# Patient Record
Sex: FEMALE | Race: WHITE | NOT HISPANIC OR LATINO | Employment: OTHER | URBAN - METROPOLITAN AREA
[De-identification: names, ages, dates, MRNs, and addresses within clinical notes are randomized per-mention and may not be internally consistent; named-entity substitution may affect disease eponyms.]

---

## 2019-05-23 DIAGNOSIS — Z01.818 PREOP TESTING: Primary | ICD-10-CM

## 2019-05-23 DIAGNOSIS — R93.89 THICKENED ENDOMETRIUM: ICD-10-CM

## 2019-05-23 RX ORDER — SODIUM CHLORIDE, SODIUM LACTATE, POTASSIUM CHLORIDE, CALCIUM CHLORIDE 600; 310; 30; 20 MG/100ML; MG/100ML; MG/100ML; MG/100ML
INJECTION, SOLUTION INTRAVENOUS CONTINUOUS
Status: CANCELLED | OUTPATIENT
Start: 2019-05-23

## 2019-05-23 RX ORDER — DOXYCYCLINE HYCLATE 100 MG
100 TABLET ORAL
Status: CANCELLED | OUTPATIENT
Start: 2019-05-23

## 2019-05-24 ENCOUNTER — HOSPITAL ENCOUNTER (OUTPATIENT)
Dept: CARDIOLOGY | Facility: HOSPITAL | Age: 84
Discharge: HOME | End: 2019-05-24
Attending: OBSTETRICS & GYNECOLOGY
Payer: MEDICARE

## 2019-05-24 DIAGNOSIS — Z01.818 PREOP TESTING: ICD-10-CM

## 2019-05-24 DIAGNOSIS — R93.89 THICKENED ENDOMETRIUM: ICD-10-CM

## 2019-05-24 PROCEDURE — 93010 ELECTROCARDIOGRAM REPORT: CPT | Mod: ,,, | Performed by: INTERNAL MEDICINE

## 2019-05-24 PROCEDURE — 93010 EKG 12-LEAD: ICD-10-PCS | Mod: ,,, | Performed by: INTERNAL MEDICINE

## 2019-05-24 PROCEDURE — 93005 ELECTROCARDIOGRAM TRACING: CPT

## 2019-05-27 ENCOUNTER — HOSPITAL ENCOUNTER (OUTPATIENT)
Facility: HOSPITAL | Age: 84
Discharge: HOME | End: 2019-05-27
Attending: OBSTETRICS & GYNECOLOGY | Admitting: OBSTETRICS & GYNECOLOGY
Payer: MEDICARE

## 2019-05-27 ENCOUNTER — ANESTHESIA EVENT (OUTPATIENT)
Dept: SURGERY | Facility: HOSPITAL | Age: 84
End: 2019-05-27
Payer: MEDICARE

## 2019-05-27 ENCOUNTER — ANESTHESIA (OUTPATIENT)
Dept: SURGERY | Facility: HOSPITAL | Age: 84
End: 2019-05-27
Payer: MEDICARE

## 2019-05-27 VITALS
HEIGHT: 63 IN | HEART RATE: 73 BPM | SYSTOLIC BLOOD PRESSURE: 141 MMHG | RESPIRATION RATE: 20 BRPM | BODY MASS INDEX: 33.49 KG/M2 | TEMPERATURE: 97 F | OXYGEN SATURATION: 100 % | DIASTOLIC BLOOD PRESSURE: 70 MMHG | WEIGHT: 189 LBS

## 2019-05-27 DIAGNOSIS — R93.89 THICKENED ENDOMETRIUM: ICD-10-CM

## 2019-05-27 DIAGNOSIS — N94.89 ENDOMETRIAL MASS: Primary | ICD-10-CM

## 2019-05-27 PROCEDURE — 36000706: Performed by: OBSTETRICS & GYNECOLOGY

## 2019-05-27 PROCEDURE — C1782 MORCELLATOR: HCPCS | Performed by: OBSTETRICS & GYNECOLOGY

## 2019-05-27 PROCEDURE — 88305 TISSUE EXAM BY PATHOLOGIST: CPT | Mod: 26,,, | Performed by: PATHOLOGY

## 2019-05-27 PROCEDURE — D9220A PRA ANESTHESIA: ICD-10-PCS | Mod: ANES,,, | Performed by: ANESTHESIOLOGY

## 2019-05-27 PROCEDURE — 36000707: Performed by: OBSTETRICS & GYNECOLOGY

## 2019-05-27 PROCEDURE — S0020 INJECTION, BUPIVICAINE HYDRO: HCPCS | Performed by: OBSTETRICS & GYNECOLOGY

## 2019-05-27 PROCEDURE — 37000008 HC ANESTHESIA 1ST 15 MINUTES: Performed by: OBSTETRICS & GYNECOLOGY

## 2019-05-27 PROCEDURE — D9220A PRA ANESTHESIA: ICD-10-PCS | Mod: CRNA,,, | Performed by: NURSE ANESTHETIST, CERTIFIED REGISTERED

## 2019-05-27 PROCEDURE — 63600175 PHARM REV CODE 636 W HCPCS: Performed by: NURSE ANESTHETIST, CERTIFIED REGISTERED

## 2019-05-27 PROCEDURE — 88305 TISSUE SPECIMEN TO PATHOLOGY - SURGERY: ICD-10-PCS | Mod: 26,,, | Performed by: PATHOLOGY

## 2019-05-27 PROCEDURE — D9220A PRA ANESTHESIA: Mod: ANES,,, | Performed by: ANESTHESIOLOGY

## 2019-05-27 PROCEDURE — 88305 TISSUE EXAM BY PATHOLOGIST: CPT | Performed by: PATHOLOGY

## 2019-05-27 PROCEDURE — 37000009 HC ANESTHESIA EA ADD 15 MINS: Performed by: OBSTETRICS & GYNECOLOGY

## 2019-05-27 PROCEDURE — 27201423 OPTIME MED/SURG SUP & DEVICES STERILE SUPPLY: Performed by: OBSTETRICS & GYNECOLOGY

## 2019-05-27 PROCEDURE — D9220A PRA ANESTHESIA: Mod: CRNA,,, | Performed by: NURSE ANESTHETIST, CERTIFIED REGISTERED

## 2019-05-27 PROCEDURE — 71000015 HC POSTOP RECOV 1ST HR: Performed by: OBSTETRICS & GYNECOLOGY

## 2019-05-27 PROCEDURE — 25000003 PHARM REV CODE 250: Performed by: OBSTETRICS & GYNECOLOGY

## 2019-05-27 PROCEDURE — 71000033 HC RECOVERY, INTIAL HOUR: Performed by: OBSTETRICS & GYNECOLOGY

## 2019-05-27 RX ORDER — DIPHENHYDRAMINE HYDROCHLORIDE 50 MG/ML
25 INJECTION INTRAMUSCULAR; INTRAVENOUS EVERY 4 HOURS PRN
Status: CANCELLED | OUTPATIENT
Start: 2019-05-27

## 2019-05-27 RX ORDER — SODIUM CHLORIDE, SODIUM LACTATE, POTASSIUM CHLORIDE, CALCIUM CHLORIDE 600; 310; 30; 20 MG/100ML; MG/100ML; MG/100ML; MG/100ML
125 INJECTION, SOLUTION INTRAVENOUS CONTINUOUS
Status: CANCELLED | OUTPATIENT
Start: 2019-05-27

## 2019-05-27 RX ORDER — DOXYCYCLINE HYCLATE 100 MG
200 TABLET ORAL ONCE
Status: CANCELLED | OUTPATIENT
Start: 2019-05-27 | End: 2019-05-27

## 2019-05-27 RX ORDER — SODIUM CHLORIDE, SODIUM LACTATE, POTASSIUM CHLORIDE, CALCIUM CHLORIDE 600; 310; 30; 20 MG/100ML; MG/100ML; MG/100ML; MG/100ML
INJECTION, SOLUTION INTRAVENOUS CONTINUOUS
Status: CANCELLED | OUTPATIENT
Start: 2019-05-27

## 2019-05-27 RX ORDER — SODIUM CHLORIDE, SODIUM LACTATE, POTASSIUM CHLORIDE, CALCIUM CHLORIDE 600; 310; 30; 20 MG/100ML; MG/100ML; MG/100ML; MG/100ML
INJECTION, SOLUTION INTRAVENOUS CONTINUOUS
Status: DISCONTINUED | OUTPATIENT
Start: 2019-05-27 | End: 2019-05-27 | Stop reason: HOSPADM

## 2019-05-27 RX ORDER — IBUPROFEN 800 MG/1
800 TABLET ORAL EVERY 8 HOURS PRN
Qty: 30 TABLET | Refills: 1 | Status: SHIPPED | OUTPATIENT
Start: 2019-05-27 | End: 2019-12-23 | Stop reason: SDUPTHER

## 2019-05-27 RX ORDER — MISOPROSTOL 200 UG/1
100 TABLET ORAL EVERY 6 HOURS
COMMUNITY
End: 2019-12-31

## 2019-05-27 RX ORDER — OXYCODONE AND ACETAMINOPHEN 5; 325 MG/1; MG/1
1 TABLET ORAL EVERY 8 HOURS PRN
Qty: 14 TABLET | Refills: 0 | Status: SHIPPED | OUTPATIENT
Start: 2019-05-27 | End: 2019-09-15

## 2019-05-27 RX ORDER — KETOROLAC TROMETHAMINE 30 MG/ML
15 INJECTION, SOLUTION INTRAMUSCULAR; INTRAVENOUS ONCE
Status: CANCELLED | OUTPATIENT
Start: 2019-05-27 | End: 2019-05-27

## 2019-05-27 RX ORDER — LIDOCAINE HYDROCHLORIDE 10 MG/ML
1 INJECTION, SOLUTION EPIDURAL; INFILTRATION; INTRACAUDAL; PERINEURAL ONCE
Status: CANCELLED | OUTPATIENT
Start: 2019-05-27 | End: 2019-05-27

## 2019-05-27 RX ORDER — DIPHENHYDRAMINE HCL 25 MG
25 CAPSULE ORAL EVERY 4 HOURS PRN
Status: CANCELLED | OUTPATIENT
Start: 2019-05-27

## 2019-05-27 RX ORDER — LIDOCAINE HYDROCHLORIDE 10 MG/ML
INJECTION INFILTRATION; PERINEURAL
Status: DISCONTINUED | OUTPATIENT
Start: 2019-05-27 | End: 2019-05-27 | Stop reason: HOSPADM

## 2019-05-27 RX ORDER — MORPHINE SULFATE 4 MG/ML
2 INJECTION, SOLUTION INTRAMUSCULAR; INTRAVENOUS EVERY 5 MIN PRN
Status: CANCELLED | OUTPATIENT
Start: 2019-05-27

## 2019-05-27 RX ORDER — FLUTICASONE PROPIONATE 50 UG/1
POWDER, METERED RESPIRATORY (INHALATION)
COMMUNITY

## 2019-05-27 RX ORDER — DOXYCYCLINE HYCLATE 100 MG
100 TABLET ORAL
Status: DISCONTINUED | OUTPATIENT
Start: 2019-05-27 | End: 2019-05-27 | Stop reason: HOSPADM

## 2019-05-27 RX ORDER — IBUPROFEN 600 MG/1
600 TABLET ORAL EVERY 6 HOURS PRN
Status: CANCELLED | OUTPATIENT
Start: 2019-05-27

## 2019-05-27 RX ORDER — ONDANSETRON 4 MG/1
8 TABLET, ORALLY DISINTEGRATING ORAL EVERY 8 HOURS PRN
Status: DISCONTINUED | OUTPATIENT
Start: 2019-05-27 | End: 2019-05-27 | Stop reason: HOSPADM

## 2019-05-27 RX ORDER — BUPIVACAINE HYDROCHLORIDE 5 MG/ML
INJECTION, SOLUTION EPIDURAL; INTRACAUDAL
Status: DISCONTINUED | OUTPATIENT
Start: 2019-05-27 | End: 2019-05-27 | Stop reason: HOSPADM

## 2019-05-27 RX ORDER — AMOXICILLIN 250 MG
1 CAPSULE ORAL 2 TIMES DAILY
COMMUNITY
Start: 2019-05-27 | End: 2019-09-15

## 2019-05-27 RX ORDER — KETOROLAC TROMETHAMINE 30 MG/ML
30 INJECTION, SOLUTION INTRAMUSCULAR; INTRAVENOUS ONCE
Status: DISCONTINUED | OUTPATIENT
Start: 2019-05-27 | End: 2019-05-27 | Stop reason: HOSPADM

## 2019-05-27 RX ORDER — MIDAZOLAM HYDROCHLORIDE 1 MG/ML
INJECTION, SOLUTION INTRAMUSCULAR; INTRAVENOUS
Status: DISCONTINUED | OUTPATIENT
Start: 2019-05-27 | End: 2019-05-27

## 2019-05-27 RX ORDER — CETIRIZINE HYDROCHLORIDE 10 MG/1
10 TABLET ORAL DAILY
COMMUNITY
End: 2020-02-28

## 2019-05-27 RX ORDER — PROPOFOL 10 MG/ML
VIAL (ML) INTRAVENOUS
Status: DISCONTINUED | OUTPATIENT
Start: 2019-05-27 | End: 2019-05-27

## 2019-05-27 RX ORDER — MELOXICAM 7.5 MG/1
7.5 TABLET ORAL DAILY
COMMUNITY
End: 2020-09-08 | Stop reason: SDUPTHER

## 2019-05-27 RX ORDER — LANOLIN ALCOHOL/MO/W.PET/CERES
300 CREAM (GRAM) TOPICAL DAILY
COMMUNITY
End: 2020-07-30

## 2019-05-27 RX ORDER — LEVOTHYROXINE SODIUM 25 UG/1
25 TABLET ORAL DAILY
COMMUNITY
End: 2020-07-30

## 2019-05-27 RX ORDER — OXYCODONE AND ACETAMINOPHEN 5; 325 MG/1; MG/1
1 TABLET ORAL
Status: CANCELLED | OUTPATIENT
Start: 2019-05-27

## 2019-05-27 RX ORDER — ONDANSETRON 2 MG/ML
4 INJECTION INTRAMUSCULAR; INTRAVENOUS DAILY PRN
Status: CANCELLED | OUTPATIENT
Start: 2019-05-27

## 2019-05-27 RX ADMIN — PROPOFOL 40 MG: 10 INJECTION, EMULSION INTRAVENOUS at 10:05

## 2019-05-27 RX ADMIN — SODIUM CHLORIDE, POTASSIUM CHLORIDE, SODIUM LACTATE AND CALCIUM CHLORIDE: 600; 310; 30; 20 INJECTION, SOLUTION INTRAVENOUS at 09:05

## 2019-05-27 RX ADMIN — PROPOFOL 30 MG: 10 INJECTION, EMULSION INTRAVENOUS at 10:05

## 2019-05-27 RX ADMIN — MIDAZOLAM HYDROCHLORIDE 0.5 MG: 1 INJECTION, SOLUTION INTRAMUSCULAR; INTRAVENOUS at 10:05

## 2019-05-27 RX ADMIN — DOXYCYCLINE HYCLATE 100 MG: 100 TABLET, COATED ORAL at 09:05

## 2019-05-27 RX ADMIN — PROPOFOL 20 MG: 10 INJECTION, EMULSION INTRAVENOUS at 10:05

## 2019-05-27 RX ADMIN — PROPOFOL 30 MG: 10 INJECTION, EMULSION INTRAVENOUS at 11:05

## 2019-05-27 NOTE — PLAN OF CARE
1200- out patient instructions given and verbalized understanding per pt and daughter n law. rx for motrin and percocet given to pt.

## 2019-05-27 NOTE — TRANSFER OF CARE
"Anesthesia Transfer of Care Note    Patient: Mahsa Pedroza    Procedure(s) Performed: Procedure(s) (LRB):  HYSTEROSCOPY, WITH DILATION AND CURETTAGE OF UTERUS (N/A)  HYSTEROSCOPY, THERAPEUTIC (N/A)    Patient location: PACU    Anesthesia Type: general    Transport from OR: Transported from OR on room air with adequate spontaneous ventilation    Post pain: adequate analgesia    Post assessment: no apparent anesthetic complications and tolerated procedure well    Post vital signs: stable    Level of consciousness: awake, alert and oriented    Nausea/Vomiting: no nausea/vomiting    Complications: none    Transfer of care protocol was followed      Last vitals:   Visit Vitals  /60 (BP Location: Right arm, Patient Position: Lying)   Pulse 73   Temp 36.1 °C (97 °F) (Oral)   Resp (!) 78   Ht 5' 3" (1.6 m)   Wt 85.7 kg (189 lb)   SpO2 99%   Breastfeeding? No   BMI 33.48 kg/m²     "

## 2019-05-27 NOTE — DISCHARGE INSTRUCTIONS

## 2019-05-27 NOTE — ANESTHESIA POSTPROCEDURE EVALUATION
Anesthesia Post Evaluation    Patient: Mahsa Pedroza    Procedure(s) Performed: Procedure(s) (LRB):  HYSTEROSCOPY, WITH DILATION AND CURETTAGE OF UTERUS (N/A)  HYSTEROSCOPY, THERAPEUTIC (N/A)    Final Anesthesia Type: MAC  Patient location during evaluation: PACU  Patient participation: Yes- Able to Participate  Level of consciousness: awake and alert  Post-procedure vital signs: reviewed and stable  Pain management: adequate  Airway patency: patent  PONV status at discharge: No PONV  Anesthetic complications: no      Cardiovascular status: blood pressure returned to baseline  Respiratory status: unassisted  Hydration status: euvolemic  Follow-up not needed.          Vitals Value Taken Time   /70 5/27/2019 11:56 AM   Temp 36.1 °C (97 °F) 5/27/2019  9:11 AM   Pulse 73 5/27/2019 11:58 AM   Resp 20 5/27/2019 11:25 AM   SpO2 100 % 5/27/2019 11:58 AM   Vitals shown include unvalidated device data.      Event Time     Out of Recovery 11:45:00          Pain/Oralia Score: Oralia Score: 10 (5/27/2019 12:11 PM)  Modified Oralia Score: 20 (5/27/2019 12:11 PM)

## 2019-05-27 NOTE — ANESTHESIA PREPROCEDURE EVALUATION
05/27/2019  Mahsa Pedroza is a 84 y.o., female.    Pre-op Assessment    I have reviewed the Patient Summary Reports.    I have reviewed the Nursing Notes.   I have reviewed the Medications.     Review of Systems  Anesthesia Hx:  No problems with previous Anesthesia  Neg history of prior surgery. Denies Family Hx of Anesthesia complications.   Denies Personal Hx of Anesthesia complications.   Social:  Non-Smoker    Hematology/Oncology:  Hematology Normal   Oncology Normal     EENT/Dental:EENT/Dental Normal   Cardiovascular:  Cardiovascular Normal     Pulmonary:  Pulmonary Normal    Renal/:  Renal/ Normal     Hepatic/GI:  Hepatic/GI Normal    Musculoskeletal:   Arthritis     Neurological:  Neurology Normal    Endocrine:   Hypothyroidism    Dermatological:  Skin Normal    Psych:  Psychiatric Normal           Physical Exam  General:  Well nourished    Airway/Jaw/Neck:  AIRWAY FINDINGS: Normal      Eyes/Ears/Nose:  EYES/EARS/NOSE FINDINGS: Normal   Dental:  DENTAL FINDINGS: Normal   Chest/Lungs:  Chest/Lungs Clear    Heart/Vascular:  Heart Findings: Normal Heart murmur: negative Vascular Findings: Normal    Abdomen:  Abdomen Findings: Normal    Musculoskeletal:  Musculoskeletal Findings: Normal   Skin:  Skin Findings: Normal         Anesthesia Plan  Type of Anesthesia, risks & benefits discussed:  Anesthesia Type:  MAC  Patient's Preference:   Intra-op Monitoring Plan: standard ASA monitors  Intra-op Monitoring Plan Comments:   Post Op Pain Control Plan:   Post Op Pain Control Plan Comments:   Induction:   IV  Beta Blocker:  Patient is not currently on a Beta-Blocker (No further documentation required).       Informed Consent: Patient understands risks and agrees with Anesthesia plan.  Questions answered. Anesthesia consent signed with patient.  ASA Score: 2     Day of Surgery Review of History &  Physical:    H&P update referred to the provider.

## 2019-05-27 NOTE — OP NOTE
[facility] - Periop Services  Operative Note     SUMMARY     Surgery Date: 5/27/2019     Procedure Performed By: Gordon Cedillo MD    Procedure Performed: D & C Hysteroscopy with Myosure     Anesthesia:  Choice    Assisted By: na    Pre-op Diagnosis:  Endometrial hyperplasia, unspecified [N85.00]  Post-op Diagnosis:  Post-Op Diagnosis Codes:     * Endometrial hyperplasia, unspecified [N85.00]     * endometrial mass   Estimated Blood Loss: 10 mL 420 cc fluid loss   Complications:  None  Specimen:  Specimens (From admission, onward)    Start     Ordered    05/27/19 1102  Specimen to Pathology - Surgery  Once     Start Status     05/27/19 1102 Needs to be Collected Order ID: [id number]       05/27/19 1105        Findings:  Sounded to 7 cm.  2 cm endometrial mass extruding through the anterior surface of the uterus.        Procedure Performed: D & C Hysteroscopy with Myosure     Procedure in Detail: After ensuring informed consent, the patient was taken to the operating room where general anesthesia was initiated. A time out was performed with the O.R. crew. She was placed in the adjustable Luis stirrups. Her perineum was prepped and draped in the usual sterile fashion. The anterior lip of the cervix was grasped with a single- toothed tenaculum. The uterus was sounded to the above stated length. The patient was gently dilated to the highest dilatation with the Hanks dilators. The hysteroscope was then placed inside the patients uterus, and inspection of the patients uterus revealed the above findings. The Myosure device was placed inside the uterine cavity. The endometrial pathology was removed. The hysteroscope was removed. All instruments were removed from the patients vagina. She was taken out of the adjustable Luis stirrups and placed in the supine position. She was awakened from anesthesia and taken to the recovery room in stable condition.  All counts were correct x 2. The patient  tolerated the procedure well. The tissue was sent to the pathology.

## 2019-05-27 NOTE — BRIEF OP NOTE
[facility] - Periop Services  Brief Operative Note     SUMMARY     Surgery Date: 5/27/2019     Surgeon(s) and Role:     * Gordon Cedillo MD - Primary    Assisting Surgeon: None    Pre-op Diagnosis:  Endometrial hyperplasia, unspecified [N85.00]    Post-op Diagnosis:  Post-Op Diagnosis Codes:     * Endometrial hyperplasia, unspecified [N85.00]     * endometrial mass    Procedure(s) (LRB):  HYSTEROSCOPY, WITH DILATION AND CURETTAGE OF UTERUS (N/A)  HYSTEROSCOPY, THERAPEUTIC (N/A)    Anesthesia: Choice    Description of the findings of the procedure:  Patient sounded to 7 cm .  There was a large endometrial mass approximately 2 cm extruding through the anterior surface of the uterus    Findings/Key Components: as above    Estimated Blood Loss: 10 mL         Specimens:   Specimen (12h ago, onward)    Start     Ordered    05/27/19 1102  Specimen to Pathology - Surgery  Once     Comments:  Pre-op Diagnosis: Endometrial hyperplasia, unspecified [N85.00]Pelvic pain in female [R10.2]Polyp of cervix [N84.1]Post-op Diagnosis: same Procedure(s):HYSTEROSCOPY, WITH DILATION AND CURETTAGE OF UTERUSHYSTEROSCOPY, THERAPEUTIC Number of specimens: 2Name of specimens: 1. ECC 2. Endometrial mass     Start Status     05/27/19 1102 Needs to be Collected Order ID: [id number]       05/27/19 1105          Discharge Note    SUMMARY     Admit Date: 5/27/2019    Discharge Date and Time:  05/27/2019 11:09 AM    Hospital Course (synopsis of major diagnoses, care, treatment, and services provided during the course of the hospital stay):  Procedure went well and without incident.  The patient was discharged home.      Final Diagnosis: Post-Op Diagnosis Codes:     * Endometrial hyperplasia, unspecified [N85.00]     Endometrial mass    Disposition: Home or Self Care    Follow Up/Patient Instructions:     Medications:  Reconciled Home Medications:      Medication List      START taking these medications    ibuprofen 800 MG  tablet  Commonly known as:  ADVIL,MOTRIN  Take 1 tablet (800 mg total) by mouth every 8 (eight) hours as needed for Pain.     oxyCODONE-acetaminophen 5-325 mg per tablet  Commonly known as:  PERCOCET  Take 1 tablet by mouth every 8 (eight) hours as needed for Pain.     senna-docusate 8.6-50 mg 8.6-50 mg per tablet  Commonly known as:  PERICOLACE  Take 1 tablet by mouth 2 (two) times daily.        CONTINUE taking these medications    cetirizine 10 MG tablet  Commonly known as:  ZYRTEC  Take 10 mg by mouth once daily.     fluticasone propionate 50 mcg/actuation Dsdv  Commonly known as:  FLOVENT DISKUS  Inhale into the lungs. Controller     levothyroxine 25 MCG tablet  Commonly known as:  SYNTHROID  Take 25 mcg by mouth once daily.     magnesium oxide 400 mg (241.3 mg magnesium) tablet  Commonly known as:  MAG-OX  Take 300 mg by mouth once daily.     meloxicam 7.5 MG tablet  Commonly known as:  MOBIC  Take 7.5 mg by mouth once daily.     miSOPROStol 200 MCG Tab  Commonly known as:  CYTOTEC  Take 100 mcg by mouth every 6 (six) hours.          Discharge Procedure Orders   Diet general     Other restrictions (specify):   Order Comments: Do not have sex, use tampons, or douche     Call MD for:  temperature >100.4     Call MD for:  persistent nausea and vomiting     Call MD for:  severe uncontrolled pain     Call MD for:  difficulty breathing, headache or visual disturbances     Call MD for:  redness, tenderness, or signs of infection (pain, swelling, redness, odor or green/yellow discharge around incision site)

## 2019-09-15 ENCOUNTER — HOSPITAL ENCOUNTER (EMERGENCY)
Facility: HOSPITAL | Age: 84
Discharge: HOME | End: 2019-09-15
Payer: MEDICARE

## 2019-09-15 VITALS
RESPIRATION RATE: 20 BRPM | TEMPERATURE: 99 F | SYSTOLIC BLOOD PRESSURE: 100 MMHG | OXYGEN SATURATION: 97 % | DIASTOLIC BLOOD PRESSURE: 60 MMHG | WEIGHT: 192 LBS | BODY MASS INDEX: 34.02 KG/M2 | HEIGHT: 63 IN | HEART RATE: 72 BPM

## 2019-09-15 DIAGNOSIS — N89.8 VAGINAL DISCHARGE: Primary | ICD-10-CM

## 2019-09-15 DIAGNOSIS — R25.2 LEG CRAMPING: ICD-10-CM

## 2019-09-15 DIAGNOSIS — R30.0 DYSURIA: ICD-10-CM

## 2019-09-15 LAB
BILIRUB UR QL STRIP: NEGATIVE
CLARITY UR: CLEAR
COLOR UR: YELLOW
GLUCOSE UR QL STRIP: NEGATIVE
HGB UR QL STRIP: NEGATIVE
KETONES UR QL STRIP: NEGATIVE
LEUKOCYTE ESTERASE UR QL STRIP: NEGATIVE
NITRITE UR QL STRIP: NEGATIVE
PH UR STRIP: 7 [PH] (ref 5–8)
PROT UR QL STRIP: NEGATIVE
SP GR UR STRIP: <=1.005 (ref 1–1.03)
URN SPEC COLLECT METH UR: NORMAL
UROBILINOGEN UR STRIP-ACNC: NEGATIVE EU/DL

## 2019-09-15 PROCEDURE — 99283 EMERGENCY DEPT VISIT LOW MDM: CPT

## 2019-09-15 PROCEDURE — 81003 URINALYSIS AUTO W/O SCOPE: CPT

## 2019-09-15 RX ORDER — TRAMADOL HYDROCHLORIDE 50 MG/1
50 TABLET ORAL EVERY 6 HOURS PRN
COMMUNITY
End: 2020-04-20 | Stop reason: SDUPTHER

## 2019-09-15 NOTE — ED PROVIDER NOTES
"Encounter Date: 9/15/2019       History     Chief Complaint   Patient presents with    Dysuria     Patient complaining of dysuria, vaginal itching, burning, also has chronic leg cramps that are worse now.    Vaginal Itching    leg cramps     Mahsa Pedroza is a 84 y.o female with PMHx including thyroid disease. She presents to ED with complaint of dysuria and vaginal discharge    Patient reports symptoms x 3 weeks. She called Dr. Cedillo off last week and was started on Diflucan. She reports no improvement in symptoms. She continues with discharge which she thinks is due to estrogen cream  No abdominal, flank or pelvic pain. No N/V/D.    Patient reports worsening "cramping" to lower extremities. She states "I have been dealing with this for months but its worsen today"    She complaints of left hip pain    She denies injury or trauma.    No fever, body aches, chills, chest pain or dyspnea        Review of patient's allergies indicates:   Allergen Reactions    Morphine     Sulfa (sulfonamide antibiotics)      Past Medical History:   Diagnosis Date    Arthritis     Cataracts, bilateral     Osteopenia     Thyroid disease      Past Surgical History:   Procedure Laterality Date    DILATION AND CURETTAGE OF UTERUS      EYE SURGERY      GALLBLADDER SURGERY      HYSTEROSCOPY, THERAPEUTIC N/A 5/27/2019    [facility]formed [facility]    HYSTEROSCOPY, WITH DILATION AND CURETTAGE OF UTERUS N/A 5/27/2019    Performed by [facility]illo MD at [facility] OR    JOINT REPLACEMENT      lamenectomy      TOTAL REPLACEMENT OF BOTH HIP JOINTS USING COMPUTER-ASSISTED NAVIGATION       History reviewed. No pertinent family history.  Social History     Tobacco Use    Smoking status: Never Smoker   Substance Use Topics    Alcohol use: Not Currently    Drug use: Never     Review of Systems   Constitutional: Negative for fever.   HENT: Negative for sore throat.    Respiratory: Negative for shortness of " breath.    Cardiovascular: Negative for chest pain.   Gastrointestinal: Negative for abdominal pain, anal bleeding, constipation, diarrhea, nausea and vomiting.   Genitourinary: Positive for dysuria and vaginal discharge. Negative for decreased urine volume, difficulty urinating, flank pain, frequency, genital sores, hematuria, menstrual problem, pelvic pain, urgency, vaginal bleeding and vaginal pain.   Musculoskeletal: Positive for arthralgias (Pain to left hip and bilat lower extremities). Negative for back pain.   Skin: Negative for rash.   Allergic/Immunologic: Negative.    Neurological: Negative.  Negative for weakness.   Hematological: Negative.  Does not bruise/bleed easily.   Psychiatric/Behavioral: Negative.    All other systems reviewed and are negative.      Physical Exam     Initial Vitals [09/15/19 1156]   BP Pulse Resp Temp SpO2   100/60 72 20 98.6 °F (37 °C) 97 %      MAP       --         Physical Exam    Constitutional: She appears well-developed and well-nourished.   HENT:   Head: Normocephalic.   Eyes: Conjunctivae are normal.   Neck: Normal range of motion.   Cardiovascular: Normal rate.   Pulmonary/Chest: Breath sounds normal.   Musculoskeletal: She exhibits tenderness.        Left hip: She exhibits decreased range of motion, decreased strength, tenderness and bony tenderness. She exhibits no swelling, no crepitus, no deformity and no laceration.        Legs:  Neurological: She is alert and oriented to person, place, and time. GCS score is 15. GCS eye subscore is 4. GCS verbal subscore is 5. GCS motor subscore is 6.   Skin: Skin is warm. Capillary refill takes less than 2 seconds.   Psychiatric: She has a normal mood and affect. Her behavior is normal. Judgment and thought content normal.         ED Course   Procedures  Labs Reviewed   URINALYSIS, REFLEX TO URINE CULTURE    Narrative:     Preferred Collection Type->Urine, Clean Catch          Imaging Results    None          Medical Decision  "Making:   Initial Assessment:   Patient with complaint of dysuria and vaginal discharge    Patient reports symptoms x 3 weeks. She called Dr. Cedillo off last week and was started on Diflucan. She reports no improvement in symptoms. She continues with discharge which she thinks is due to estrogen cream    No abdominal, flank or pelvic pain. No N/V/D.    Patient reports worsening "cramping" to lower extremities. She states "I have been dealing with this for months but its worsen today"    She complaints of left hip pain    She denies injury or trauma.    No fever, body aches, chills, chest pain or dyspnea    Differential Diagnosis:   UTI, candidal infection, vaginal infection    Pyelonephritis    ED Management:  Patient refused vaginal examination and further testing    Patient will contact GYN tomorrow to schedule follow-up                      Clinical Impression:       ICD-10-CM ICD-9-CM   1. Vaginal discharge N89.8 623.5   2. Leg cramping R25.2 729.82   3. Dysuria R30.0 788.1                                Ros Stapleton NP  09/16/19 1128    "

## 2019-09-15 NOTE — ED TRIAGE NOTES
Patient complaining of dysuria, vaginal itching, burning, also has chronic leg cramps that are worse now.

## 2019-09-30 LAB
CHOLEST SERPL-MSCNC: 230 MG/DL (ref 0–200)
HDLC SERPL-MCNC: 64 MG/DL
LDLC SERPL CALC-MCNC: 153 MG/DL
TRIGL SERPL-MCNC: 99 MG/DL

## 2019-12-23 ENCOUNTER — HOSPITAL ENCOUNTER (EMERGENCY)
Facility: HOSPITAL | Age: 84
Discharge: HOME | End: 2019-12-23
Payer: MEDICARE

## 2019-12-23 VITALS
TEMPERATURE: 98 F | HEART RATE: 78 BPM | HEIGHT: 63 IN | SYSTOLIC BLOOD PRESSURE: 126 MMHG | WEIGHT: 192 LBS | OXYGEN SATURATION: 98 % | BODY MASS INDEX: 34.02 KG/M2 | RESPIRATION RATE: 16 BRPM | DIASTOLIC BLOOD PRESSURE: 49 MMHG
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
## 2019-12-23 DIAGNOSIS — M25.552 LEFT HIP PAIN: ICD-10-CM

## 2019-12-23 PROCEDURE — 73521 X-RAY EXAM HIPS BI 2 VIEWS: CPT | Mod: 26,,, | Performed by: RADIOLOGY

## 2019-12-23 PROCEDURE — 99283 EMERGENCY DEPT VISIT LOW MDM: CPT | Mod: 25

## 2019-12-23 PROCEDURE — 73521 X-RAY EXAM HIPS BI 2 VIEWS: CPT | Mod: TC,FY

## 2019-12-23 PROCEDURE — 73521 XR HIPS BILATERAL 2 VIEW INCL AP PELVIS: ICD-10-PCS | Mod: 26,,, | Performed by: RADIOLOGY

## 2019-12-23 RX ORDER — KETOROLAC TROMETHAMINE 10 MG/1
10 TABLET, FILM COATED ORAL EVERY 6 HOURS PRN
Qty: 10 TABLET | Refills: 0 | Status: SHIPPED | OUTPATIENT
Start: 2019-12-23 | End: 2019-12-23 | Stop reason: CLARIF

## 2019-12-23 NOTE — DISCHARGE INSTRUCTIONS
Over-the-counter medications for pain as needed    Rest    Return to emergency department if symptoms worsen.

## 2019-12-23 NOTE — ED PROVIDER NOTES
Encounter Date: 12/23/2019       History     Chief Complaint   Patient presents with    Fall     Last Wednesday    left hip     Mhasa Pedroza is a 85 y.o female with past medical history including arthritis, osteopenia and thyroid disease. She presents to emergency room with complaint of left hip pain    Patient reports ground level fall 1 week ago. She reports that she tripped while doing laundry.  She complaint of pain to left hip.  Patient with history of bilateral hip replacements.    No obvious hip deformity. No swelling or discoloration. No shortening or outward rotation of lower extremity    Lower extremities are neurovascularly intact    She is ambulatory      She denies head injury or LOC    No other injuries    No fever, chills, chest pain, dyspnea, weakness, dizziness, rash or vomiting        Review of patient's allergies indicates:   Allergen Reactions    Morphine     Sulfa (sulfonamide antibiotics)      Past Medical History:   Diagnosis Date    Arthritis     Cataracts, bilateral     Osteopenia     Thyroid disease      Past Surgical History:   Procedure Laterality Date    DILATION AND CURETTAGE OF UTERUS      EYE SURGERY      GALLBLADDER SURGERY      HYSTEROSCOPIC SURGICAL PROCEDURE N/A 5/27/2019    Procedure: HYSTEROSCOPY, THERAPEUTIC;  Surgeon: Gordon Cedillo MD;  Location: [facility] OR;  Service: OB/GYN;  Laterality: N/A;  [facility]    HYSTEROSCOPY WITH DILATION AND CURETTAGE OF UTERUS N/A 5/27/2019    Procedure: HYSTEROSCOPY, WITH DILATION AND CURETTAGE OF UTERUS;  Surgeon: Gordon Cedillo MD;  Location: [facility] OR;  Service: OB/GYN;  Laterality: N/A;    JOINT REPLACEMENT      lamenectomy      TOTAL REPLACEMENT OF BOTH HIP JOINTS USING COMPUTER-ASSISTED NAVIGATION       No family history on file.  Social History     Tobacco Use    Smoking status: Never Smoker   Substance Use Topics    Alcohol use: Not Currently    Drug use: Never     Review of Systems   Constitutional: Negative.     HENT: Negative.    Eyes: Negative.    Respiratory: Negative.    Cardiovascular: Negative.    Gastrointestinal: Negative.    Endocrine: Negative.    Genitourinary: Negative.    Musculoskeletal: Positive for arthralgias (left hip).   Allergic/Immunologic: Negative.    Neurological: Negative.    Hematological: Negative.    Psychiatric/Behavioral: Negative.        Physical Exam     Initial Vitals [12/23/19 1355]   BP Pulse Resp Temp SpO2   (!) 126/49 78 16 98.3 °F (36.8 °C) 98 %      MAP       --         Physical Exam    Nursing note and vitals reviewed.  Constitutional: She appears well-developed and well-nourished.   HENT:   Head: Normocephalic.   Neck: Normal range of motion. Neck supple.   Cardiovascular: Normal rate.   Pulmonary/Chest: Breath sounds normal.   Abdominal: Soft. Bowel sounds are normal. She exhibits no distension. There is no tenderness. There is no rebound and no guarding.   Musculoskeletal: Normal range of motion. She exhibits tenderness.        Left hip: She exhibits tenderness and bony tenderness. She exhibits normal range of motion, normal strength, no swelling, no crepitus, no deformity and no laceration.        Legs:  Neurological: She is alert and oriented to person, place, and time. GCS score is 15. GCS eye subscore is 4. GCS verbal subscore is 5. GCS motor subscore is 6.   Skin: Skin is warm. Capillary refill takes less than 2 seconds.   Psychiatric: She has a normal mood and affect. Her behavior is normal. Judgment and thought content normal.         ED Course   Procedures  Labs Reviewed - No data to display       Imaging Results          X-Ray Hips Bilateral 2 View Incl AP Pelvis (Final result)  Result time 12/23/19 15:51:42    Final result by Reagan Calabrese MD (12/23/19 15:51:42)                 Impression:      1. Previous bilateral total hip arthroplasty.  2. Bone demineralization.      Electronically signed by: Reagan Calabrese  Date:    12/23/2019  Time:    15:51              Narrative:    EXAMINATION:  XR HIPS BILATERAL 2 VIEW INCL AP PELVIS    CLINICAL HISTORY:  Unspecified fall, initial encounter    TECHNIQUE:  AP view of the pelvis and frogleg lateral views of both hips were performed.    COMPARISON:  11/06/2008.    FINDINGS:  Previous bilateral total hip arthroplasty.  Normal anatomic alignment.  No plain film evidence to suggest hardware failure.    Pubic symphysis, bilateral pubic rami and SI joints are intact.    There is mild bone demineralization.                                 Medical Decision Making:   Initial Assessment:   Patient with complaint of left hip pain    Patient reports ground level fall 1 week ago. She reports that she tripped while doing laundry.  She complaint of pain to left hip.  Patient with history of bilateral hip replacements.    No obvious hip deformity. No swelling or discoloration. No shortening or outward rotation of lower extremity    Lower extremities are neurovascularly intact    She is ambulatory      She denies head injury or LOC    No other injuries    No fever, chills, chest pain, dyspnea, weakness, dizziness, rash or vomiting  Differential Diagnosis:   Bone injury, contusion, muscle strain    Injury to hardware  ED Management:  XR with no acute findings    Discussed physical exam findings with patient  No acute emergent medical condition identified at this time to warrant further testing/diagnostics  At this time, I believe the patient is clinically stable for discharge.   Patient to follow up with PCP in 1-2 days.  The patient acknowledges that close follow up with a MD is required after all ER visits  Pt given instructions; take all medications prescribed in the ER as directed.   Patient agrees to comply with all instruction and direction given in the ER  Pt agrees to return to ER if any symptoms reoccur     Patient ambulatory to exit with steady gait                                       Clinical Impression:       ICD-10-CM ICD-9-CM   1.  Fall W19.XXXA E888.9   2. Left hip pain M25.552 719.45                             Ros Stapleton NP  12/23/19 2000

## 2019-12-30 ENCOUNTER — TELEPHONE (OUTPATIENT)
Dept: FAMILY MEDICINE | Facility: CLINIC | Age: 84
End: 2019-12-30

## 2019-12-30 NOTE — TELEPHONE ENCOUNTER
Attempted to contact patient in regards to referral placed by ED. Patient did not answer. I did leave a detailed message with my name and reason for the call. Provided in the message a call back number to the office to schedule NP appointment.

## 2019-12-31 ENCOUNTER — OFFICE VISIT (OUTPATIENT)
Dept: FAMILY MEDICINE | Facility: CLINIC | Age: 84
End: 2019-12-31
Payer: MEDICARE

## 2019-12-31 VITALS
TEMPERATURE: 98 F | HEART RATE: 63 BPM | HEIGHT: 64 IN | SYSTOLIC BLOOD PRESSURE: 132 MMHG | BODY MASS INDEX: 33.35 KG/M2 | WEIGHT: 195.38 LBS | RESPIRATION RATE: 17 BRPM | OXYGEN SATURATION: 97 % | DIASTOLIC BLOOD PRESSURE: 59 MMHG

## 2019-12-31 DIAGNOSIS — M25.552 PAIN OF LEFT HIP JOINT: Primary | ICD-10-CM

## 2019-12-31 PROCEDURE — 1159F MED LIST DOCD IN RCRD: CPT | Mod: S$GLB,,, | Performed by: FAMILY MEDICINE

## 2019-12-31 PROCEDURE — 99203 PR OFFICE/OUTPT VISIT, NEW, LEVL III, 30-44 MIN: ICD-10-PCS | Mod: S$GLB,,, | Performed by: FAMILY MEDICINE

## 2019-12-31 PROCEDURE — 1125F PR PAIN SEVERITY QUANTIFIED, PAIN PRESENT: ICD-10-PCS | Mod: S$GLB,,, | Performed by: FAMILY MEDICINE

## 2019-12-31 PROCEDURE — 99203 OFFICE O/P NEW LOW 30 MIN: CPT | Mod: S$GLB,,, | Performed by: FAMILY MEDICINE

## 2019-12-31 PROCEDURE — 1125F AMNT PAIN NOTED PAIN PRSNT: CPT | Mod: S$GLB,,, | Performed by: FAMILY MEDICINE

## 2019-12-31 PROCEDURE — 1159F PR MEDICATION LIST DOCUMENTED IN MEDICAL RECORD: ICD-10-PCS | Mod: S$GLB,,, | Performed by: FAMILY MEDICINE

## 2019-12-31 RX ORDER — FLUCONAZOLE 100 MG/1
TABLET ORAL
COMMUNITY
Start: 2019-11-08 | End: 2020-03-16 | Stop reason: SDUPTHER

## 2019-12-31 RX ORDER — CONJUGATED ESTROGENS 0.62 MG/G
CREAM VAGINAL
COMMUNITY
Start: 2019-12-20 | End: 2020-07-30

## 2019-12-31 RX ORDER — DICLOFENAC SODIUM 10 MG/G
4 GEL TOPICAL 4 TIMES DAILY PRN
Qty: 100 G | Refills: 1 | Status: SHIPPED | OUTPATIENT
Start: 2019-12-31 | End: 2020-09-23

## 2020-01-02 ENCOUNTER — PATIENT OUTREACH (OUTPATIENT)
Dept: ADMINISTRATIVE | Facility: HOSPITAL | Age: 85
End: 2020-01-02

## 2020-01-02 NOTE — PROGRESS NOTES
[facility] - Clinic Note    Subjective      Ms. Pedroza is a 85 y.o. female who presents to clinic for Establish Care and Hip Pain (fell x 2w ago and injured L hip)    Patient has a past history of arthritis, osteopenia, thyroid disease. She reports that she fell about 2 weeks ago and injured her left hip.  She tripped while she was doing laundry and jammed her hip.  She has a history of bilateral hip replacements.  She is able to walk around.  She was seen in the ER about a week ago in x-rays were done which did not show any fractures or displacement of the hip joint.  She is tender on the outside of her hip.  Denies any numbness or tingling down her legs.  Denies any shooting pains down her legs.  Also has history of prolapsed bladder status post lift but this had to be reversed.  She is not having any issues with this at this time.    [facility] Mahsa has a past medical history of Arthritis, Cataracts, bilateral, Osteopenia, and Thyroid disease.   PSX Mahsa has a past surgical history that includes Eye surgery; Joint replacement; lamenectomy; Total replacement of both hip joints using computer-assisted navigation; Gallbladder surgery; Dilation and curettage of uterus; Hysteroscopy with dilation and curettage of uterus (N/A, 5/27/2019); and Hysteroscopic surgical procedure (N/A, 5/27/2019).    Mahsa's family history is not on file.    Mahsa reports that she has never smoked. She does not have any smokeless tobacco history on file. She reports that she drank alcohol. She reports that she does not use drugs.   BRISA Bragg is allergic to adhesive; morphine; oxymetazoline hcl; sulfa (sulfonamide antibiotics); and xylometazoline hcl.   VAL Bragg has a current medication list which includes the following prescription(s): cetirizine, fluconazole, fluticasone propionate, levothyroxine, magnesium oxide, meloxicam, premarin, tramadol, and diclofenac sodium.     Review of Systems   Constitutional:  "Negative for chills and fever.   HENT: Negative for congestion and rhinorrhea.    Eyes: Negative for visual disturbance.   Respiratory: Negative for cough and shortness of breath.    Cardiovascular: Negative for chest pain.   Gastrointestinal: Negative for abdominal pain, constipation, diarrhea, nausea and vomiting.   Genitourinary: Negative for dysuria.   Musculoskeletal: Positive for arthralgias. Negative for myalgias.   Skin: Negative for rash.   Neurological: Negative for weakness and headaches.     Objective     BP (!) 132/59 (BP Location: Right arm, Patient Position: Sitting, BP Method: X-Large (Automatic))   Pulse 63   Temp 98.3 °F (36.8 °C) (Oral)   Resp 17   Ht 5' 4" (1.626 m)   Wt 88.6 kg (195 lb 6.4 oz)   SpO2 97%   BMI 33.54 kg/m²     Physical Exam   Constitutional: She appears well-developed and well-nourished. No distress.   HENT:   Right Ear: External ear normal.   Left Ear: External ear normal.   Eyes: Right eye exhibits no discharge. Left eye exhibits no discharge.   Cardiovascular: Normal rate, regular rhythm and normal heart sounds.   Pulmonary/Chest: Effort normal and breath sounds normal. She has no wheezes. She has no rales.   Musculoskeletal:        Left hip: She exhibits bony tenderness. She exhibits normal range of motion and normal strength.   Neurological: She is alert.   Skin: Skin is warm and dry.   Psychiatric: She has a normal mood and affect.   Nursing note and vitals reviewed.     Assessment/Plan     1. Pain of left hip joint  diclofenac sodium (VOLTAREN) 1 % Gel     Likely muscular strain versus bursitis.  Will treat with diclofenac gel.  Continue anti-inflammatory medicine.  Will obtain records from patient's previous physicians in [place] and in [place] for review.    Follow up in about 2 months (around 2/29/2020) for follow up.    Future Appointments   Date Time Provider Department Center   2/28/2020  8:40 AM Braden Newby MD [facility] JESSICA Mcdermott "         Braden Newby MD  Family Medicine  [facility]

## 2020-01-25 ENCOUNTER — NURSE TRIAGE (OUTPATIENT)
Dept: ADMINISTRATIVE | Facility: CLINIC | Age: 85
End: 2020-01-25

## 2020-01-25 NOTE — TELEPHONE ENCOUNTER
Reason for Disposition   Caller has medication question about med not prescribed by PCP and triager unable to answer question (e.g., compatibility with other med, storage)    Protocols used: MEDICATION QUESTION CALL-A-    Pt called with questions regarding OTC meds for a cold and how they will interact with her current medications. Advised to discuss with any local Pharmacist. Pt verbalized understanding.

## 2020-02-17 ENCOUNTER — PATIENT OUTREACH (OUTPATIENT)
Dept: ADMINISTRATIVE | Facility: HOSPITAL | Age: 85
End: 2020-02-17

## 2020-02-17 NOTE — LETTER
February 17, 2020    Mahsa Pedroza  [address]  [place] [address]             [facility] [address] [address]  Phone: [phone] Dear Barbara [facility] is committed to your overall health and would like to ensure that you are up to date on your recommended test and/or procedures.   Braden Newby MD  has found that your chart shows you may be due for the following:    Health Maintenance Due   Topic Date Due    TETANUS VACCINE  10/03/1952    DEXA SCAN  10/03/1974    Shingles Vaccine (1 of 2) 10/03/1984    Pneumococcal Vaccine (65+ Low/Medium Risk) (1 of 2 - PCV13) 10/03/1999     If you have had any of the above done at another facility, please let us know so that we may obtain copies from that facility.  If you have a copy of these records, please provide a copy for us to scan into your chart.  You are welcome to request that the report be faxed to us at  ([phone]).     Otherwise, please schedule these appointments at your earliest convenience by calling [phone] or going to [email or web address].    If you have an upcoming scheduled appointment for the item above, please disregard this letter.    Sincerely,  [facility]  MD Melisa Araya L.P.N. Clinical Care Coordinator  [address] [place] [address], [place] [address] [phone]

## 2020-02-20 ENCOUNTER — TELEPHONE (OUTPATIENT)
Dept: ADMINISTRATIVE | Facility: HOSPITAL | Age: 85
End: 2020-02-20

## 2020-02-20 NOTE — TELEPHONE ENCOUNTER
Pt calling about a letter she had received from me. Went over the letter and educated on overdue HM.

## 2020-02-28 ENCOUNTER — OFFICE VISIT (OUTPATIENT)
Dept: FAMILY MEDICINE | Facility: CLINIC | Age: 85
End: 2020-02-28
Payer: MEDICARE

## 2020-02-28 VITALS
OXYGEN SATURATION: 96 % | RESPIRATION RATE: 15 BRPM | SYSTOLIC BLOOD PRESSURE: 108 MMHG | WEIGHT: 193 LBS | BODY MASS INDEX: 32.95 KG/M2 | DIASTOLIC BLOOD PRESSURE: 71 MMHG | TEMPERATURE: 98 F | HEART RATE: 83 BPM | HEIGHT: 64 IN

## 2020-02-28 DIAGNOSIS — M25.552 PAIN OF LEFT HIP JOINT: Primary | ICD-10-CM

## 2020-02-28 PROCEDURE — 99213 PR OFFICE/OUTPT VISIT, EST, LEVL III, 20-29 MIN: ICD-10-PCS | Mod: S$GLB,,, | Performed by: FAMILY MEDICINE

## 2020-02-28 PROCEDURE — 99213 OFFICE O/P EST LOW 20 MIN: CPT | Mod: S$GLB,,, | Performed by: FAMILY MEDICINE

## 2020-02-29 NOTE — PROGRESS NOTES
"[facility] - Clinic Note    Subjective      Ms. Pedroza is a 85 y.o. female who presents to clinic for Follow-up    Patient reports that since our last visit she has been doing better.  Her hip pain has improved.  The diclofenac gel worked well for her.  She otherwise has no complaints at this time.    [facility] Mahsa has a past medical history of Arthritis, Cataracts, bilateral, Osteopenia, and Thyroid disease.   PSXH Mahsa has a past surgical history that includes Eye surgery; Joint replacement; lamenectomy; Total replacement of both hip joints using computer-assisted navigation; Gallbladder surgery; Dilation and curettage of uterus; Hysteroscopy with dilation and curettage of uterus (N/A, 5/27/2019); and Hysteroscopic surgical procedure (N/A, 5/27/2019).    Mahsa's family history is not on file.    Mahsa reports that she has never smoked. She does not have any smokeless tobacco history on file. She reports that she drank alcohol. She reports that she does not use drugs.   [facility]G Mahsa is allergic to adhesive; morphine; oxymetazoline hcl; sulfa (sulfonamide antibiotics); and xylometazoline hcl.   [facility] Mahsa has a current medication list which includes the following prescription(s): diclofenac sodium, fluconazole, fluticasone propionate, levothyroxine, magnesium oxide, meloxicam, premarin, and tramadol.     Review of Systems   Constitutional: Negative for chills and fever.   Musculoskeletal: Negative for arthralgias.     Objective     /71 (BP Location: Right arm, Patient Position: Sitting, BP Method: X-Large (Automatic))   Pulse 83   Temp 98 °F (36.7 °C) (Oral)   Resp 15   Ht 5' 4" (1.626 m)   Wt 87.5 kg (193 lb)   SpO2 96%   BMI 33.13 kg/m²     Physical Exam   Constitutional: She appears well-developed and well-nourished. No distress.   HENT:   Right Ear: External ear normal.   Left Ear: External ear normal.   Eyes: Right eye exhibits no discharge. Left eye exhibits no discharge. "   Cardiovascular: Normal rate, regular rhythm and normal heart sounds.   Pulmonary/Chest: Effort normal and breath sounds normal. She has no wheezes. She has no rales.   Musculoskeletal: She exhibits no edema.        Left hip: She exhibits bony tenderness. She exhibits normal range of motion and normal strength.   Gait improved.   Neurological: She is alert.   Skin: Skin is warm and dry.   Psychiatric: She has a normal mood and affect.   Nursing note and vitals reviewed.     Assessment/Plan     1. Pain of left hip joint       Pain in the left hip joint has improved.  Continue diclofenac gel as needed.  Continue all other medications as needed.  When patient needs refill of tramadol call back.  Patient reports that she had a DEXA scan at [facility] on April of 2019.  She also had a pneumococcal vaccine on April of 2019.    Follow up in about 3 months (around 5/28/2020) for follow up.    Future Appointments   Date Time Provider Department Center   5/28/2020  9:40 AM Braden Newby MD [facility]         Braden Newby MD  Family Medicine  [facility]

## 2020-03-07 ENCOUNTER — NURSE TRIAGE (OUTPATIENT)
Dept: ADMINISTRATIVE | Facility: CLINIC | Age: 85
End: 2020-03-07

## 2020-03-07 DIAGNOSIS — R20.0 NUMBNESS: Primary | ICD-10-CM

## 2020-03-07 NOTE — TELEPHONE ENCOUNTER
Pt states she has been having numbness in both her hand and feet, this has been going on for a while now and it comes and goes, she states she has told her providers but they have not been too worried about it, she denies any new or concerning symptoms, she is able to ambulate, she was just curious as to what may cause it, advised her to see her provider within 3 days and to call back with any worsening symptoms, needs, or concerns, caller agreed     Reason for Disposition   [1] Numbness or tingling in one or both hands AND [2] is a chronic symptom (recurrent or ongoing AND present > 4 weeks)    Additional Information   Negative: [1] SEVERE weakness (i.e., unable to walk or barely able to walk, requires support) AND [2] new onset or worsening   Negative: [1] Weakness (i.e., paralysis, loss of muscle strength) of the face, arm / hand, or leg / foot on one side of the body AND [2] sudden onset AND [3] present now   Negative: [1] Numbness (i.e., loss of sensation) of the face, arm / hand, or leg / foot on one side of the body AND [2] sudden onset AND [3] present now   Negative: [1] Loss of speech or garbled speech AND [2] sudden onset AND [3] present now   Negative: Difficult to awaken or acting confused (e.g., disoriented, slurred speech)   Negative: Sounds like a life-threatening emergency to the triager   Negative: Confusion, disorientation, or hallucinations is main symptom   Negative: Neck pain is main symptom (and having weakness, numbness, or tingling in arm / hand because of neck pain)   Negative: Back pain is main symptom (and having weakness, numbness, or tingling in leg because of back pain)   Negative: Hand pain is main symptom (and having mild weakness, numbness, or tingling in hand related to hand pain)   Negative: Dizziness is main symptom   Negative: Vision loss or change is main symptom   Negative: Followed a head injury within last 3 days   Negative: Followed a neck injury within last 3  days   Negative: [1] Tingling in both hands and/or feet AND [2] breathing faster than normal AND [3] feels similar to prior panic attack or hyperventilation episode   Negative: Weakness in both sides of the body or weakness all over   Negative: Headache  (and neurologic deficit)   Negative: [1] Back pain AND [2] numbness (loss of sensation) in groin or rectal area   Negative: [1] Unable to urinate (or only a few drops) > 4 hours AND [2] bladder feels very full (e.g., palpable bladder or strong urge to urinate)   Negative: [1] Loss of control of bowel or bladder (i.e., incontinence) AND [2] new onset   Negative: [1] Weakness (i.e., paralysis, loss of muscle strength) of the face, arm / hand, or leg / foot on one side of the body AND [2] sudden onset AND [3] brief (now gone)   Negative: [1] Numbness (i.e., loss of sensation) of the face, arm / hand, or leg / foot on one side of the body AND [2] sudden onset AND [3] brief (now gone)   Negative: [1] Loss of speech or garbled speech AND [2] sudden onset AND [3] brief (now gone)   Negative: Bell's palsy suspected (i.e., weakness on only one side of the face, developing over hours to days, no other symptoms)   Negative: Patient sounds very sick or weak to the triager   Negative: Neck pain  (and neurologic deficit)   Negative: Back pain  (and neurologic deficit)   Negative: [1] Weakness of the face, arm / hand, or leg / foot on one side of the body AND [2] gradual onset (e.g., days to weeks) AND [3] present now   Negative: [1] Numbness (i.e., loss of sensation) of the face, arm / hand, or leg / foot on one side of the body AND [2] gradual onset (e.g., days to weeks) AND [3] present now   Negative: [1] Loss of speech or garbled speech AND [2] gradual onset (e.g., days to weeks) AND [3] present now   Negative: [1] Tingling (e.g., pins and needles) of the face, arm / hand, or leg / foot on one side of the body AND [2] present now    Protocols used: NEUROLOGIC  DEFICIT-A-AH

## 2020-03-10 ENCOUNTER — TELEPHONE (OUTPATIENT)
Dept: FAMILY MEDICINE | Facility: CLINIC | Age: 85
End: 2020-03-10

## 2020-03-10 NOTE — TELEPHONE ENCOUNTER
It could be related to vitamin deficiency or thyroid abnormalities; we can check some blood work to first evaluate. I have placed these orders and she can get them done at her earliest convenience.

## 2020-03-10 NOTE — TELEPHONE ENCOUNTER
Spoke with pt to let her know that Dr. Galvez ordered some blood work to do at her earliest convenience. Pt stated that she will be here tomorrow to do the blood work.

## 2020-03-11 ENCOUNTER — NURSE TRIAGE (OUTPATIENT)
Dept: ADMINISTRATIVE | Facility: CLINIC | Age: 85
End: 2020-03-11

## 2020-03-11 ENCOUNTER — LAB VISIT (OUTPATIENT)
Dept: LAB | Facility: HOSPITAL | Age: 85
End: 2020-03-11
Attending: FAMILY MEDICINE
Payer: MEDICARE

## 2020-03-11 DIAGNOSIS — R20.0 NUMBNESS: ICD-10-CM

## 2020-03-11 LAB
ANION GAP SERPL CALC-SCNC: 6 MMOL/L (ref 8–16)
BUN SERPL-MCNC: 16 MG/DL (ref 8–23)
CALCIUM SERPL-MCNC: 8.7 MG/DL (ref 8.7–10.5)
CHLORIDE SERPL-SCNC: 103 MMOL/L (ref 95–110)
CO2 SERPL-SCNC: 28 MMOL/L (ref 23–29)
CREAT SERPL-MCNC: 0.7 MG/DL (ref 0.5–1.4)
EST. GFR  (AFRICAN AMERICAN): >60 ML/MIN/1.73 M^2
EST. GFR  (NON AFRICAN AMERICAN): >60 ML/MIN/1.73 M^2
FOLATE SERPL-MCNC: 16.9 NG/ML (ref 4–24)
GLUCOSE SERPL-MCNC: 94 MG/DL (ref 70–110)
POTASSIUM SERPL-SCNC: 4.3 MMOL/L (ref 3.5–5.1)
SODIUM SERPL-SCNC: 137 MMOL/L (ref 136–145)
T4 FREE SERPL-MCNC: 0.73 NG/DL (ref 0.71–1.51)
TSH SERPL DL<=0.005 MIU/L-ACNC: 4.05 UIU/ML (ref 0.34–5.6)
VIT B12 SERPL-MCNC: 977 PG/ML (ref 210–950)

## 2020-03-11 PROCEDURE — 82746 ASSAY OF FOLIC ACID SERUM: CPT

## 2020-03-11 PROCEDURE — 84443 ASSAY THYROID STIM HORMONE: CPT

## 2020-03-11 PROCEDURE — 84439 ASSAY OF FREE THYROXINE: CPT

## 2020-03-11 PROCEDURE — 82607 VITAMIN B-12: CPT

## 2020-03-11 PROCEDURE — 80048 BASIC METABOLIC PNL TOTAL CA: CPT

## 2020-03-11 PROCEDURE — 36415 COLL VENOUS BLD VENIPUNCTURE: CPT

## 2020-03-11 NOTE — TELEPHONE ENCOUNTER
"Pt called stating she has not had a pneumonia vaccination in 3-4 years, pt requesting info regarding vaccination and if she is able to receive vaccination from her PCP's office    Reason for Disposition   Caller has medication question only, adult not sick, and triager answers question    Additional Information   Negative: Drug overdose and nurse unable to answer question   Negative: Caller requesting information not related to medicine   Negative: Caller requesting a prescription for Strep throat and has a positive culture result   Negative: Rash while taking a medication or within 3 days of stopping it   Negative: Immunization reaction suspected   Negative: [1] Asthma AND [2] having symptoms of asthma (cough, wheezing, etc)   Negative: MORE THAN A DOUBLE DOSE of a prescription or over-the-counter (OTC) drug   Negative: [1] DOUBLE DOSE (an extra dose or lesser amount) of over-the-counter (OTC) drug AND [2] any symptoms (e.g., dizziness, nausea, pain, sleepiness)   Negative: [1] DOUBLE DOSE (an extra dose or lesser amount) of prescription drug AND [2] any symptoms (e.g., dizziness, nausea, pain, sleepiness)   Negative: Took another person's prescription drug   Negative: [1] DOUBLE DOSE (an extra dose or lesser amount) of prescription drug AND [2] NO symptoms (Exception: a double dose of antibiotics)   Negative: Diabetes drug error or overdose (e.g., insulin or extra dose)   Negative: [1] Request for URGENT new prescription or refill of "essential" medication (i.e., likelihood of harm to patient if not taken) AND [2] triager unable to fill per unit policy   Negative: [1] Prescription not at pharmacy AND [2] was prescribed today by PCP   Negative: Pharmacy calling with prescription questions and triager unable to answer question   Negative: Caller has urgent medication question about med that PCP prescribed and triager unable to answer question   Negative: Caller has NON-URGENT medication question " about med that PCP prescribed and triager unable to answer question   Negative: Caller requesting a NON-URGENT new prescription or refill and triager unable to refill per unit policy   Negative: Caller has medication question about med not prescribed by PCP and triager unable to answer question (e.g., compatibility with other med, storage)   Negative: [1] DOUBLE DOSE (an extra dose or lesser amount) of over-the-counter (OTC) drug AND [2] NO symptoms   Negative: [1] DOUBLE DOSE (an extra dose or lesser amount) of antibiotic drug AND [2] NO symptoms    Protocols used: MEDICATION QUESTION CALL-A-AH

## 2020-03-12 ENCOUNTER — TELEPHONE (OUTPATIENT)
Dept: FAMILY MEDICINE | Facility: CLINIC | Age: 85
End: 2020-03-12

## 2020-03-12 NOTE — TELEPHONE ENCOUNTER
Pt coming in on  Tomorrow for a pneumonia shot.            ----- Message from Mayra Siddiqui sent at 3/12/2020  8:16 AM CDT -----  Type: Needs Medical Advice    Who Called:  Patient  Best Call Back Number: [phone] (home)     Additional Information: Wants to know if she can make an appointment to come in for a nurse appointment for a pneumonia shot tomorrow. Please call to advise.

## 2020-03-15 ENCOUNTER — HOSPITAL ENCOUNTER (EMERGENCY)
Facility: HOSPITAL | Age: 85
Discharge: HOME | End: 2020-03-15
Attending: EMERGENCY MEDICINE
Payer: MEDICARE

## 2020-03-15 VITALS
WEIGHT: 193 LBS | RESPIRATION RATE: 18 BRPM | TEMPERATURE: 98 F | SYSTOLIC BLOOD PRESSURE: 132 MMHG | DIASTOLIC BLOOD PRESSURE: 63 MMHG | HEART RATE: 72 BPM | HEIGHT: 64 IN | OXYGEN SATURATION: 97 % | BODY MASS INDEX: 32.95 KG/M2

## 2020-03-15 DIAGNOSIS — R20.2 PARESTHESIAS: Primary | ICD-10-CM

## 2020-03-15 DIAGNOSIS — F41.9 ANXIETY: ICD-10-CM

## 2020-03-15 LAB
ALBUMIN SERPL BCP-MCNC: 4.1 G/DL (ref 3.5–5.2)
ALP SERPL-CCNC: 52 U/L (ref 55–135)
ALT SERPL W/O P-5'-P-CCNC: 22 U/L (ref 10–44)
ANION GAP SERPL CALC-SCNC: 5 MMOL/L (ref 8–16)
APTT BLDCRRT: 25.7 SEC (ref 21–32)
AST SERPL-CCNC: 29 U/L (ref 10–40)
BASOPHILS # BLD AUTO: 0.03 K/UL (ref 0–0.2)
BASOPHILS NFR BLD: 0.5 % (ref 0–1.9)
BILIRUB SERPL-MCNC: 0.2 MG/DL (ref 0.1–1)
BILIRUB UR QL STRIP: NEGATIVE
BNP SERPL-MCNC: 62 PG/ML (ref 0–99)
BUN SERPL-MCNC: 16 MG/DL (ref 8–23)
CALCIUM SERPL-MCNC: 8.6 MG/DL (ref 8.7–10.5)
CHLORIDE SERPL-SCNC: 96 MMOL/L (ref 95–110)
CLARITY UR: CLEAR
CO2 SERPL-SCNC: 29 MMOL/L (ref 23–29)
COLOR UR: YELLOW
CREAT SERPL-MCNC: 0.6 MG/DL (ref 0.5–1.4)
DIFFERENTIAL METHOD: ABNORMAL
EOSINOPHIL # BLD AUTO: 0.1 K/UL (ref 0–0.5)
EOSINOPHIL NFR BLD: 2.2 % (ref 0–8)
ERYTHROCYTE [DISTWIDTH] IN BLOOD BY AUTOMATED COUNT: 13.1 % (ref 11.5–14.5)
EST. GFR  (AFRICAN AMERICAN): >60 ML/MIN/1.73 M^2
EST. GFR  (NON AFRICAN AMERICAN): >60 ML/MIN/1.73 M^2
GLUCOSE SERPL-MCNC: 99 MG/DL (ref 70–110)
GLUCOSE UR QL STRIP: NEGATIVE
HCT VFR BLD AUTO: 39 % (ref 37–48.5)
HGB BLD-MCNC: 12.8 G/DL (ref 12–16)
HGB UR QL STRIP: NEGATIVE
IMM GRANULOCYTES # BLD AUTO: 0.04 K/UL (ref 0–0.04)
IMM GRANULOCYTES NFR BLD AUTO: 0.6 % (ref 0–0.5)
INFLUENZA A, MOLECULAR: NEGATIVE
INFLUENZA B, MOLECULAR: NEGATIVE
INR PPP: 1 (ref 0.8–1.2)
KETONES UR QL STRIP: NEGATIVE
LEUKOCYTE ESTERASE UR QL STRIP: NEGATIVE
LYMPHOCYTES # BLD AUTO: 1.6 K/UL (ref 1–4.8)
LYMPHOCYTES NFR BLD: 25.9 % (ref 18–48)
MCH RBC QN AUTO: 29.6 PG (ref 27–31)
MCHC RBC AUTO-ENTMCNC: 32.8 G/DL (ref 32–36)
MCV RBC AUTO: 90 FL (ref 82–98)
MONOCYTES # BLD AUTO: 0.6 K/UL (ref 0.3–1)
MONOCYTES NFR BLD: 8.7 % (ref 4–15)
NEUTROPHILS # BLD AUTO: 3.9 K/UL (ref 1.8–7.7)
NEUTROPHILS NFR BLD: 62.1 % (ref 38–73)
NITRITE UR QL STRIP: NEGATIVE
NRBC BLD-RTO: 0 /100 WBC
PH UR STRIP: 7 [PH] (ref 5–8)
PLATELET # BLD AUTO: 185 K/UL (ref 150–350)
PMV BLD AUTO: 10 FL (ref 9.2–12.9)
POTASSIUM SERPL-SCNC: 4.2 MMOL/L (ref 3.5–5.1)
PROT SERPL-MCNC: 7 G/DL (ref 6–8.4)
PROT UR QL STRIP: NEGATIVE
PROTHROMBIN TIME: 10.5 SEC (ref 9–12.5)
RBC # BLD AUTO: 4.32 M/UL (ref 4–5.4)
SODIUM SERPL-SCNC: 130 MMOL/L (ref 136–145)
SP GR UR STRIP: 1.01 (ref 1–1.03)
SPECIMEN SOURCE: NORMAL
TROPONIN I SERPL DL<=0.01 NG/ML-MCNC: 0.02 NG/ML (ref 0.02–0.5)
URN SPEC COLLECT METH UR: NORMAL
UROBILINOGEN UR STRIP-ACNC: NEGATIVE EU/DL
WBC # BLD AUTO: 6.3 K/UL (ref 3.9–12.7)

## 2020-03-15 PROCEDURE — 99285 EMERGENCY DEPT VISIT HI MDM: CPT | Mod: 25

## 2020-03-15 PROCEDURE — 87502 INFLUENZA DNA AMP PROBE: CPT

## 2020-03-15 PROCEDURE — 71045 XR CHEST AP PORTABLE: ICD-10-PCS | Mod: 26,,, | Performed by: RADIOLOGY

## 2020-03-15 PROCEDURE — 93005 ELECTROCARDIOGRAM TRACING: CPT

## 2020-03-15 PROCEDURE — 71045 X-RAY EXAM CHEST 1 VIEW: CPT | Mod: 26,,, | Performed by: RADIOLOGY

## 2020-03-15 PROCEDURE — 71045 X-RAY EXAM CHEST 1 VIEW: CPT | Mod: TC,FY

## 2020-03-15 PROCEDURE — 85610 PROTHROMBIN TIME: CPT

## 2020-03-15 PROCEDURE — 85730 THROMBOPLASTIN TIME PARTIAL: CPT

## 2020-03-15 PROCEDURE — 84484 ASSAY OF TROPONIN QUANT: CPT

## 2020-03-15 PROCEDURE — 81003 URINALYSIS AUTO W/O SCOPE: CPT

## 2020-03-15 PROCEDURE — 80053 COMPREHEN METABOLIC PANEL: CPT

## 2020-03-15 PROCEDURE — 83880 ASSAY OF NATRIURETIC PEPTIDE: CPT

## 2020-03-15 PROCEDURE — 85025 COMPLETE CBC W/AUTO DIFF WBC: CPT

## 2020-03-15 PROCEDURE — 36415 COLL VENOUS BLD VENIPUNCTURE: CPT

## 2020-03-15 NOTE — ED TRIAGE NOTES
Patient relays that she awoke early this am with pain in her legs. She took a pain pill but then started having leg cramps. She relays that she has had weakness and numbness all over.

## 2020-03-16 ENCOUNTER — OFFICE VISIT (OUTPATIENT)
Dept: FAMILY MEDICINE | Facility: CLINIC | Age: 85
End: 2020-03-16
Payer: MEDICARE

## 2020-03-16 VITALS
HEART RATE: 86 BPM | HEIGHT: 64 IN | DIASTOLIC BLOOD PRESSURE: 51 MMHG | SYSTOLIC BLOOD PRESSURE: 114 MMHG | RESPIRATION RATE: 16 BRPM | TEMPERATURE: 98 F | BODY MASS INDEX: 33.15 KG/M2 | WEIGHT: 194.19 LBS | OXYGEN SATURATION: 94 %

## 2020-03-16 DIAGNOSIS — R20.0 NUMBNESS: Primary | ICD-10-CM

## 2020-03-16 DIAGNOSIS — B37.9 YEAST INFECTION: ICD-10-CM

## 2020-03-16 PROCEDURE — 99214 PR OFFICE/OUTPT VISIT, EST, LEVL IV, 30-39 MIN: ICD-10-PCS | Mod: S$GLB,,, | Performed by: FAMILY MEDICINE

## 2020-03-16 PROCEDURE — 99214 OFFICE O/P EST MOD 30 MIN: CPT | Mod: S$GLB,,, | Performed by: FAMILY MEDICINE

## 2020-03-16 RX ORDER — GABAPENTIN 100 MG/1
100 CAPSULE ORAL NIGHTLY
Qty: 30 CAPSULE | Refills: 11 | Status: SHIPPED | OUTPATIENT
Start: 2020-03-16 | End: 2020-05-29 | Stop reason: SDUPTHER

## 2020-03-16 RX ORDER — FLUCONAZOLE 100 MG/1
100 TABLET ORAL
Qty: 2 TABLET | Refills: 0 | Status: SHIPPED | OUTPATIENT
Start: 2020-03-16 | End: 2020-03-20

## 2020-03-16 NOTE — PROGRESS NOTES
[facility] - Clinic Note    Subjective      Ms. Pedroza is a 85 y.o. female who presents to clinic for Numbness (all over body; went to ER on 03/15/2020) and Fatigue    Patient went to the ER yesterday for numbness in her lower extremities and weakness.  Lab work was within normal limits.  Patient reports that today she feels the same.  She has numbness in both of her lower extremities intermittently.  She also has been having issues with panic attacks and anxiety.  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  She also reports a yeast infection has been starting.    [facility] Mahsa has a past medical history of Arthritis, Cataracts, bilateral, Osteopenia, and Thyroid disease.   PSX Mahsa has a past surgical history that includes Eye surgery; Joint replacement; lamenectomy; Total replacement of both hip joints using computer-assisted navigation; Gallbladder surgery; Dilation and curettage of uterus; Hysteroscopy with dilation and curettage of uterus (N/A, 5/27/2019); Hysteroscopic surgical procedure (N/A, 5/27/2019); and Cholecystectomy.    Mahsa's family history is not on file.    Mahsa reports that she has never smoked. She has never used smokeless tobacco. She reports that she drank alcohol. She reports that she does not use drugs.   BRISA Bragg is allergic to adhesive; morphine; oxymetazoline hcl; sulfa (sulfonamide antibiotics); and xylometazoline hcl.   VAL Bragg has a current medication list which includes the following prescription(s): diclofenac sodium, fluconazole, fluticasone propionate, levothyroxine, magnesium oxide, meloxicam, premarin, tramadol, and gabapentin.     Review of Systems   Constitutional: Negative for chills and fever.   Neurological: Positive for weakness and numbness.   Psychiatric/Behavioral: The patient is nervous/anxious.      Objective     BP (!) 114/51 (BP Location: Right arm, Patient Position: Sitting, BP Method: Large  "(Automatic))   Pulse 86   Temp 97.9 °F (36.6 °C) (Oral)   Resp 16   Ht 5' 4" (1.626 m)   Wt 88.1 kg (194 lb 3.2 oz)   SpO2 (!) 94%   BMI 33.33 kg/m²     Physical Exam   Constitutional: She appears well-developed and well-nourished. No distress.   HENT:   Right Ear: External ear normal.   Left Ear: External ear normal.   Eyes: Right eye exhibits no discharge. Left eye exhibits no discharge.   Cardiovascular: Normal rate, regular rhythm and normal heart sounds.   Pulmonary/Chest: Effort normal and breath sounds normal. She has no wheezes. She has no rales.   Neurological: She is alert.   Skin: Skin is warm and dry.   Psychiatric: She has a normal mood and affect.   Nursing note and vitals reviewed.     Assessment/Plan     1. Numbness  gabapentin (NEURONTIN) 100 MG capsule   2. Yeast infection  fluconazole (DIFLUCAN) 100 MG tablet     Lab work within normal limits.  Numbness could be related to various orthopedic issues status post surgery including back surgery and bilateral total hip replacements.  Will start gabapentin 100 mg q.h.s. which may help with sleep and with the numbness.  Will titrate up as indicated.  Could consider Zoloft for anxiety but do not want to start several medications at once.  Will prescribe Diflucan for yeast infection which may be contributing to patient's weakness.    Follow up in about 2 months (around 5/16/2020) for follow up.    Future Appointments   Date Time Provider Department Center   5/28/2020  9:40 AM Braden Newby MD [facility]         Braden Newby MD  Family Medicine  [facility]          "

## 2020-03-19 NOTE — ED PROVIDER NOTES
Encounter Date: 3/15/2020       History     Chief Complaint   Patient presents with    Fatigue     85-year-old female with past medical history significant for arthritis, bilateral cataracts, osteopenia and hypothyroidism presents to the ED via EMS for evaluation of intermittent fatigue and bilateral upper extremity numbness that has been ongoing approximately 5 years.  States she was at home watching the news, became very anxious, and experienced increased numbness which concerned her.  She has been in her normal state of health and has not traveled anywhere.  Denies fever, chills.  Denies sick contacts.  Denies chest pain, dyspnea, palpitations, diaphoresis, edema, weight gain.  Denies abdominal pain, nausea, vomiting, diarrhea.  Denies headache, blurred vision, speech difficulty, gait abnormality, incontinence, weakness.        Review of patient's allergies indicates:   Allergen Reactions    Adhesive     Morphine     Oxymetazoline hcl     Sulfa (sulfonamide antibiotics)     Xylometazoline hcl      Past Medical History:   Diagnosis Date    Arthritis     Cataracts, bilateral     Osteopenia     Thyroid disease      Past Surgical History:   Procedure Laterality Date    CHOLECYSTECTOMY      DILATION AND CURETTAGE OF UTERUS      EYE SURGERY      GALLBLADDER SURGERY      HYSTEROSCOPIC SURGICAL PROCEDURE N/A 5/27/2019    Procedure: HYSTEROSCOPY, THERAPEUTIC;  Surgeon: Gordon Cedillo MD;  Location: [facility];  Service: OB/GYN;  Laterality: N/A;  mysoure    HYSTEROSCOPY WITH DILATION AND CURETTAGE OF UTERUS N/A 5/27/2019    Procedure: HYSTEROSCOPY, WITH DILATION AND CURETTAGE OF UTERUS;  Surgeon: Gordon Cedillo MD;  Location: [facility] OR;  Service: OB/GYN;  Laterality: N/A;    JOINT REPLACEMENT      lamenectomy      TOTAL REPLACEMENT OF BOTH HIP JOINTS USING COMPUTER-ASSISTED NAVIGATION       History reviewed. No pertinent family history.  Social History     Tobacco Use    Smoking status: Never  Smoker    Smokeless tobacco: Never Used   Substance Use Topics    Alcohol use: Not Currently    Drug use: Never     Review of Systems   Constitutional: Positive for fatigue. Negative for appetite change, chills, diaphoresis and fever.   HENT: Negative for congestion, ear pain, rhinorrhea, sinus pressure, sinus pain, sore throat and tinnitus.    Eyes: Negative for photophobia and visual disturbance.   Respiratory: Negative for cough, chest tightness, shortness of breath and wheezing.    Cardiovascular: Negative for chest pain, palpitations and leg swelling.   Gastrointestinal: Negative for abdominal pain, constipation, diarrhea, nausea and vomiting.   Endocrine: Negative for cold intolerance, heat intolerance, polydipsia, polyphagia and polyuria.   Genitourinary: Negative for decreased urine volume, difficulty urinating, dysuria, flank pain, frequency, hematuria and urgency.   Musculoskeletal: Negative for arthralgias, back pain, gait problem, joint swelling, myalgias, neck pain and neck stiffness.   Skin: Negative for color change, pallor, rash and wound.   Allergic/Immunologic: Negative for immunocompromised state.   Neurological: Positive for numbness. Negative for dizziness, syncope, weakness, light-headedness and headaches.   Hematological: Negative for adenopathy. Does not bruise/bleed easily.   Psychiatric/Behavioral: Negative for decreased concentration, dysphoric mood and sleep disturbance. The patient is not nervous/anxious.    All other systems reviewed and are negative.      Physical Exam     Initial Vitals [03/15/20 0941]   BP Pulse Resp Temp SpO2   (!) 184/85 80 18 97.8 °F (36.6 °C) 98 %      MAP       --         Physical Exam    Nursing note and vitals reviewed.  Constitutional: She appears well-developed and well-nourished. She is not diaphoretic. No distress.   HENT:   Head: Normocephalic and atraumatic.   Right Ear: External ear normal.   Left Ear: External ear normal.   Nose: Nose normal.    Mouth/Throat: Oropharynx is clear and moist.   Eyes: Conjunctivae are normal. Pupils are equal, round, and reactive to light. No scleral icterus.   Neck: Normal range of motion. Neck supple. No JVD present.   Cardiovascular: Normal rate, regular rhythm, normal heart sounds and intact distal pulses.   Pulmonary/Chest: Breath sounds normal. No respiratory distress. She has no wheezes. She has no rhonchi. She has no rales. She exhibits no tenderness.   Abdominal: Soft. Bowel sounds are normal. She exhibits no distension. There is no tenderness. There is no rebound and no guarding.   Musculoskeletal: Normal range of motion. She exhibits no edema or tenderness.   Lymphadenopathy:     She has no cervical adenopathy.   Neurological: She is alert and oriented to person, place, and time. GCS score is 15. GCS eye subscore is 4. GCS verbal subscore is 5. GCS motor subscore is 6.   Skin: Skin is warm and dry. Capillary refill takes less than 2 seconds. No rash noted. No erythema. No pallor.   Psychiatric: Her speech is normal and behavior is normal. Judgment and thought content normal. Her mood appears anxious.         ED Course   Procedures  Labs Reviewed   CBC W/ AUTO DIFFERENTIAL - Abnormal; Notable for the following components:       Result Value    Immature Granulocytes 0.6 (*)     All other components within normal limits   COMPREHENSIVE METABOLIC PANEL - Abnormal; Notable for the following components:    Sodium 130 (*)     Calcium 8.6 (*)     Alkaline Phosphatase 52 (*)     Anion Gap 5 (*)     All other components within normal limits   INFLUENZA A & B BY MOLECULAR   APTT   B-TYPE NATRIURETIC PEPTIDE   PROTIME-INR   TROPONIN I   URINALYSIS, REFLEX TO URINE CULTURE    Narrative:     Preferred Collection Type->Urine, Clean Catch     EKG Readings: (Independently Interpreted)   Initial Reading: No STEMI. Rhythm: Normal Sinus Rhythm. Heart Rate: 82. Ectopy: No Ectopy. Conduction: Normal. ST Segments: Normal ST Segments. T  Waves: Normal. Axis: Normal. Clinical Impression: Normal Sinus Rhythm     ECG Results          EKG 12-lead (Final result)  Result time 03/16/20 12:25:32    Final result by Interface, Lab In [facility] (03/16/20 12:25:32)                 Narrative:    Test Reason : F41.9,    Vent. Rate : 082 BPM     Atrial Rate : 082 BPM     P-R Int : 196 ms          QRS Dur : 090 ms      QT Int : 392 ms       P-R-T Axes : 041 030 067 degrees     QTc Int : 457 ms    Normal sinus rhythm  Normal ECG  When compared with ECG of 24-MAY-2019 10:45,  No significant change was found  Confirmed by Carlos Sanz MD ([id number]) on 3/16/2020 12:25:26 PM    Referred By: [email or web address]   SELF           Confirmed By:Carlos Sanz MD                            Imaging Results          X-Ray Chest AP Portable (Final result)  Result time 03/15/20 10:05:20    Final result by Radha Cardona MD (03/15/20 10:05:20)                 Impression:      Increased interstitial prominence in the lung bases, progression of chronic changes versus superimposed atelectasis, bronchitis or viral infection.      Electronically signed by: Radha Cardona  Date:    03/15/2020  Time:    10:05             Narrative:    EXAMINATION:  XR CHEST AP PORTABLE    CLINICAL HISTORY:  dyspnea;    TECHNIQUE:  Single frontal view of the chest was performed.    COMPARISON:  08/25/2008    FINDINGS:  A portable view of the chest demonstrates normal heart size.  No mediastinal abnormality.  Coarse interstitial changes in the lung bases have increased compared to the previous study and may be exaggerated by lower lung volumes.  There is no focal consolidation.  No pleural effusion.  There are degenerative changes in the spine.                                 Medical Decision Making:   Differential Diagnosis:   Paresthesias, fatigue, anemia, acute myocardial infarction, electrolyte derangement, dehydration, influenza, acute cystitis                                 Clinical Impression:        ICD-10-CM ICD-9-CM   1. Paresthesias R20.2 782.0   2. Anxiety F41.9 300.00         Disposition:   Disposition: Discharged  Condition: Stable     ED Disposition Condition    Discharge Stable        ED Prescriptions     None        Follow-up Information     Follow up With Specialties Details Why Contact Info    Braden Newby MD Family Medicine Schedule an appointment as soon as possible for a visit in 1 day  [address] [facility] [address]  [phone]                                       Ana Gibson MD  03/19/20 0140

## 2020-03-25 ENCOUNTER — TELEPHONE (OUTPATIENT)
Dept: FAMILY MEDICINE | Facility: CLINIC | Age: 85
End: 2020-03-25

## 2020-03-25 DIAGNOSIS — F41.9 ANXIETY: Primary | ICD-10-CM

## 2020-03-25 RX ORDER — SERTRALINE HYDROCHLORIDE 25 MG/1
25 TABLET, FILM COATED ORAL DAILY
Qty: 30 TABLET | Refills: 11 | Status: SHIPPED | OUTPATIENT
Start: 2020-03-25 | End: 2020-07-30

## 2020-03-25 NOTE — TELEPHONE ENCOUNTER
----- Message from Elva Hodges sent at 3/25/2020  3:23 PM CDT -----  Type: Needs Medical Advice    Who Called:  Patient  Best Call Back Number: [phone]  Additional Information: Patient stated that the numbness medication is working and she would like to have some medication for anxiety ordered/[facility] [place]/please order or if any questions call back.

## 2020-03-30 NOTE — TELEPHONE ENCOUNTER
Try taking half a tablet of the zoloft daily for a week or two then increase to 25 mg daily. In the beginning you can have some upset stomach with zoloft but it gets better once you get used to the medicine.

## 2020-03-30 NOTE — TELEPHONE ENCOUNTER
03/30/2020  2:00PM--Patient stated she has been having an upsert stomach since she has been taking Zoloft. Needing something for anxiety. Please advise,. Thanks

## 2020-03-31 ENCOUNTER — TELEPHONE (OUTPATIENT)
Dept: FAMILY MEDICINE | Facility: CLINIC | Age: 85
End: 2020-03-31

## 2020-04-20 NOTE — TELEPHONE ENCOUNTER
----- Message from Lalo Anaya sent at 4/20/2020  2:43 PM CDT -----  Type:  RX Refill Request    Who Called:  Patient  Refill or New Rx:  Refill  RX Name and Strength:  traMADol (ULTRAM) 50 mg tablet  How is the patient currently taking it? (ex. 1XDay): 1 tablet by mouth every 4 hours as needed for pain  Is this a 30 day or 90 day RX:  90  Preferred Pharmacy with phone number:    [id number] - [place] [address] [address]  [address] [place] [id number]  Phone: [phone] Fax: [phone]    Local or Mail Order:  Local  Ordering Provider:  Keyonna  Best Call Back Number:  [phone]  Additional Information:

## 2020-04-20 NOTE — TELEPHONE ENCOUNTER
----- Message from Lalo Anaya sent at 4/20/2020  2:43 PM CDT -----  Type:  RX Refill Request    Who Called:  Patient  Refill or New Rx:  Refill  RX Name and Strength:  traMADol (ULTRAM) 50 mg tablet  How is the patient currently taking it? (ex. 1XDay): 1 tablet by mouth every 4 hours as needed for pain  Is this a 30 day or 90 day RX:  90  Preferred Pharmacy with phone number:    [id number] - [place] [address] [address]  [address] [place] [id number]  Phone: [phone] Fax: [phone]    Local or Mail Order:  Local  Ordering Provider:  Keyonna  Best Call Back Number:  [phone]  Additional Information:

## 2020-04-21 RX ORDER — TRAMADOL HYDROCHLORIDE 50 MG/1
50 TABLET ORAL EVERY 8 HOURS PRN
Qty: 60 TABLET | Refills: 0 | Status: SHIPPED | OUTPATIENT
Start: 2020-04-21 | End: 2020-06-23 | Stop reason: SDUPTHER

## 2020-05-14 ENCOUNTER — PATIENT OUTREACH (OUTPATIENT)
Dept: ADMINISTRATIVE | Facility: HOSPITAL | Age: 85
End: 2020-05-14

## 2020-05-28 ENCOUNTER — OFFICE VISIT (OUTPATIENT)
Dept: FAMILY MEDICINE | Facility: CLINIC | Age: 85
End: 2020-05-28
Payer: MEDICARE

## 2020-05-28 VITALS
DIASTOLIC BLOOD PRESSURE: 65 MMHG | HEART RATE: 75 BPM | HEIGHT: 64 IN | BODY MASS INDEX: 33.02 KG/M2 | TEMPERATURE: 98 F | OXYGEN SATURATION: 95 % | WEIGHT: 193.38 LBS | SYSTOLIC BLOOD PRESSURE: 151 MMHG | RESPIRATION RATE: 16 BRPM

## 2020-05-28 DIAGNOSIS — M25.552 PAIN OF LEFT HIP JOINT: ICD-10-CM

## 2020-05-28 DIAGNOSIS — R20.0 NUMBNESS: Primary | ICD-10-CM

## 2020-05-28 PROCEDURE — 99214 OFFICE O/P EST MOD 30 MIN: CPT | Mod: S$GLB,,, | Performed by: FAMILY MEDICINE

## 2020-05-28 PROCEDURE — 99214 PR OFFICE/OUTPT VISIT, EST, LEVL IV, 30-39 MIN: ICD-10-PCS | Mod: S$GLB,,, | Performed by: FAMILY MEDICINE

## 2020-05-29 RX ORDER — GABAPENTIN 100 MG/1
200 CAPSULE ORAL NIGHTLY
Qty: 60 CAPSULE | Refills: 11 | Status: SHIPPED | OUTPATIENT
Start: 2020-05-29 | End: 2020-11-23 | Stop reason: SDUPTHER

## 2020-05-29 NOTE — PROGRESS NOTES
[facility] Note    Subjective      Ms. Pedroza is a 85 y.o. female who presents to clinic for Follow-up (pt states she still have a tingel in her left leg with numbness )    Patient reports that she still has some tingling and numbness in her left leg.  She has been taking the gabapentin at night.  She has a history of total replacement of both hip joints.  She still has some intermittent pain in the hips.  Some weakness in the legs and difficulty getting up.  Would benefit from physical therapy but difficult to leave the house by herself.  Also reports some tingling in her right fingers.  Has what appears to be a ganglion cyst in the right wrist.    PM Mahsa has a past medical history of Arthritis, Cataracts, bilateral, Osteopenia, and Thyroid disease.   PSXH Mahsa has a past surgical history that includes Eye surgery; Joint replacement; lamenectomy; Total replacement of both hip joints using computer-assisted navigation; Gallbladder surgery; Dilation and curettage of uterus; Hysteroscopy with dilation and curettage of uterus (N/A, 5/27/2019); Hysteroscopic surgical procedure (N/A, 5/27/2019); and Cholecystectomy.    Mahsa's family history is not on file.    Mahsa reports that she has never smoked. She has never used smokeless tobacco. She reports that she drank alcohol. She reports that she does not use drugs.   BRISA Bragg is allergic to adhesive; morphine; oxymetazoline hcl; sulfa (sulfonamide antibiotics); and xylometazoline hcl.   [facility] Mahsa has a current medication list which includes the following prescription(s): diclofenac sodium, estradiol, fluticasone propionate, gabapentin, levothyroxine, magnesium oxide, meloxicam, premarin, sertraline, and tramadol.     Review of Systems   Constitutional: Negative for chills and fever.   Musculoskeletal: Positive for arthralgias.   Neurological: Positive for weakness and numbness.     Objective     BP (!) 151/65 (BP Location: Left arm,  "Patient Position: Sitting, BP Method: X-Large (Automatic))   Pulse 75   Temp 98.1 °F (36.7 °C) (Oral)   Resp 16   Ht 5' 4" (1.626 m)   Wt 87.7 kg (193 lb 6.4 oz)   SpO2 95%   BMI 33.20 kg/m²     Physical Exam   Constitutional: She is oriented to person, place, and time. She appears well-developed and well-nourished. No distress.   HENT:   Head: Normocephalic and atraumatic.   Right Ear: External ear normal.   Left Ear: External ear normal.   Eyes: Right eye exhibits no discharge. Left eye exhibits no discharge.   Cardiovascular: Normal rate, regular rhythm and normal heart sounds.   Pulmonary/Chest: Effort normal and breath sounds normal. She has no wheezes. She has no rales.   Musculoskeletal:   0.5 cm nodule in the right wrist that appears to be a ganglion cyst.  Right hand neurovascularly intact.   Neurological: She is alert and oriented to person, place, and time.   Skin: Skin is warm and dry. She is not diaphoretic.   Psychiatric: She has a normal mood and affect.   Nursing note and vitals reviewed.     Assessment/Plan     1. Numbness  gabapentin (NEURONTIN) 100 MG capsule    Ambulatory referral/consult to Home Health   2. Pain of left hip joint  Ambulatory referral/consult to Home Health     Will increase gabapentin to 200 mg q.h.s..  Recommended patient use a neutral wrist splint for her right hand.  If symptoms continue could consider referral to orthopedics for further evaluation.  Continue anti-inflammatory medicine as tolerated.  Patient would benefit from physical therapy.  Will send prescription for home health physical therapy.    Follow up in about 2 months (around 7/28/2020) for follow up.    Future Appointments   Date Time Provider Department Center   7/28/2020 10:20 AM Braden Newby MD [facility]         Braden Newby MD  Family Medicine  [facility]          "

## 2020-06-05 ENCOUNTER — TELEPHONE (OUTPATIENT)
Dept: FAMILY MEDICINE | Facility: CLINIC | Age: 85
End: 2020-06-05

## 2020-06-05 NOTE — TELEPHONE ENCOUNTER
----- Message from Annamaria Newman sent at 6/5/2020 12:19 PM CDT -----  Contact: pt [phone]  Patient called she is asking about her referral to home health for Physical Therapy .  Patient states she has not heard back from anyone at this time   Call back   [phone]

## 2020-06-05 NOTE — TELEPHONE ENCOUNTER
Called pt via phone and let her know that a referral to [facility] for physical therapy has been ordered. Pt states she did get a new phone so she is unable to retrieve her messages so if anyone calls pt states just keep calling till she figures out her phone. I spoke to Yared with [facility] and he was informed of pt cell phone and all documentation has been faxed over to [facility].

## 2020-06-09 DIAGNOSIS — F41.9 ANXIETY: Primary | ICD-10-CM

## 2020-06-09 DIAGNOSIS — R20.0 NUMBNESS: ICD-10-CM

## 2020-06-09 RX ORDER — HYDROXYZINE PAMOATE 25 MG/1
25 CAPSULE ORAL EVERY 8 HOURS PRN
Qty: 60 CAPSULE | Refills: 0 | Status: SHIPPED | OUTPATIENT
Start: 2020-06-09 | End: 2020-08-25

## 2020-06-09 RX ORDER — GABAPENTIN 100 MG/1
200 CAPSULE ORAL NIGHTLY
Qty: 60 CAPSULE | Refills: 11 | Status: CANCELLED | OUTPATIENT
Start: 2020-06-09 | End: 2021-06-09

## 2020-06-09 NOTE — TELEPHONE ENCOUNTER
Spoke with patient, she feels tightness in her chest and stated Zoloft is making her more nervous. She took a Tramadol this morning and if she needs to take one later for her hip pain would it be ok? She wanted me to ask you even though I explained to her it's prescribed Q8 hrs PRN she wanted to ask you. She stated years ago she would take Xanax when she would have a panic attack which was very rare. Asking if that can be prescribed instead of Zoloft? Please advise. Thanks        . She wanted Kym stated she wa ----- Message from Zoya Talbot sent at 6/9/2020 10:10 AM CDT -----  Contact: Patient   Patient called and said she wants to speak to a nurse     Patient can be reached at [phone]

## 2020-06-09 NOTE — TELEPHONE ENCOUNTER
Its fine to take the tramadol again today. 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 We could try vistaril for panic attack symptoms if she would like.

## 2020-06-18 ENCOUNTER — NURSE TRIAGE (OUTPATIENT)
Dept: ADMINISTRATIVE | Facility: CLINIC | Age: 85
End: 2020-06-18

## 2020-06-18 NOTE — TELEPHONE ENCOUNTER
Has been dizzy for the last few days  Last night was so dizzy was scared to get up to bathroom  Woke up feeling better, but is worse again tonight  She is very dizzy right now  Recommended ED now.  She is going to talk to her daughter in law first about her symptoms before deciding what to do.    Reason for Disposition   [1] Dizziness (vertigo) present now AND [2] age > 60  (Exception: prior physician evaluation for this AND no different/worse than usual)    Additional Information   Negative: [1] Weakness (i.e., paralysis, loss of muscle strength) of the face, arm or leg on one side of the body AND [2] sudden onset AND [3] present now   Negative: [1] Numbness (i.e., loss of sensation) of the face, arm or leg on one side of the body AND [2] sudden onset AND [3] present now   Negative: [1] Loss of speech or garbled speech AND [2] sudden onset AND [3] present now   Negative: Difficult to awaken or acting confused (e.g., disoriented, slurred speech)   Negative: Sounds like a life-threatening emergency to the triager   Negative: Followed a head injury   Negative: Followed an ear injury   Negative: Localized weakness or numbness is main symptom   Negative: Dizziness relates to riding in a car, going to an amusement park, etc.   Negative: [1] Dizziness is main symptom AND [2] NO spinning sensation (i.e., vertigo)   Negative: SEVERE dizziness (vertigo) (e.g., unable to walk without assistance)   Negative: [1] Dizziness (vertigo) present now AND [2] one or more stroke risk factors (i.e., hypertension, diabetes, prior stroke/TIA/heart attack)  (Exception: prior physician evaluation for this AND no different/worse than usual)    Protocols used: DIZZINESS - VERTIGO-A-

## 2020-06-23 RX ORDER — TRAMADOL HYDROCHLORIDE 50 MG/1
50 TABLET ORAL EVERY 8 HOURS PRN
Qty: 60 TABLET | Refills: 0 | Status: SHIPPED | OUTPATIENT
Start: 2020-06-23 | End: 2020-09-23

## 2020-07-08 ENCOUNTER — TELEPHONE (OUTPATIENT)
Dept: FAMILY MEDICINE | Facility: CLINIC | Age: 85
End: 2020-07-08

## 2020-07-08 DIAGNOSIS — L60.8 TOENAIL DEFORMITY: Primary | ICD-10-CM

## 2020-07-08 NOTE — TELEPHONE ENCOUNTER
----- Message from Rony Sanches sent at 7/8/2020  1:03 PM CDT -----  Contact: patient  Patient called in and would like to get a referral to see Dr. Goevani Reddy, Podiatry at the [facility] location.  Patient stated she is having issues with her left toenails turning color.    Patient call back number is [phone]

## 2020-07-09 NOTE — TELEPHONE ENCOUNTER
----- Message from Regina Quiros LPN sent at 7/8/2020  2:43 PM CDT -----  Contact: patient  Pt requesting referral for Podiatry  d/t left toenails turning colors.  ----- Message -----  From: Rony Sanches  Sent: 7/8/2020   1:03 PM CDT  [facility]    Patient called in and would like to get a referral to see Dr. Geovani Reddy, Podiatry at the [facility] location.  Patient stated she is having issues with her left toenails turning color.    Patient call back number is [phone]

## 2020-07-14 ENCOUNTER — PATIENT OUTREACH (OUTPATIENT)
Dept: ADMINISTRATIVE | Facility: OTHER | Age: 85
End: 2020-07-14

## 2020-07-14 ENCOUNTER — OFFICE VISIT (OUTPATIENT)
Dept: PODIATRY | Facility: CLINIC | Age: 85
End: 2020-07-14
Payer: MEDICARE

## 2020-07-14 VITALS
BODY MASS INDEX: 32.24 KG/M2 | HEIGHT: 64 IN | SYSTOLIC BLOOD PRESSURE: 138 MMHG | HEART RATE: 79 BPM | WEIGHT: 188.88 LBS | TEMPERATURE: 98 F | RESPIRATION RATE: 19 BRPM | OXYGEN SATURATION: 98 % | DIASTOLIC BLOOD PRESSURE: 72 MMHG

## 2020-07-14 DIAGNOSIS — L60.8 TOENAIL DEFORMITY: ICD-10-CM

## 2020-07-14 DIAGNOSIS — L60.0 INGROWN NAIL OF GREAT TOE OF LEFT FOOT: Primary | ICD-10-CM

## 2020-07-14 DIAGNOSIS — L60.8 ACQUIRED DYSMORPHIC TOENAIL: ICD-10-CM

## 2020-07-14 DIAGNOSIS — M79.675 PAIN OF TOES OF BOTH FEET: ICD-10-CM

## 2020-07-14 DIAGNOSIS — B35.1 ONYCHOMYCOSIS OF TOENAIL: ICD-10-CM

## 2020-07-14 DIAGNOSIS — M79.674 PAIN OF TOES OF BOTH FEET: ICD-10-CM

## 2020-07-14 PROCEDURE — 99999 PR PBB SHADOW E&M-EST. PATIENT-LVL IV: ICD-10-PCS | Mod: PBBFAC,,, | Performed by: PODIATRIST

## 2020-07-14 PROCEDURE — 99203 OFFICE O/P NEW LOW 30 MIN: CPT | Mod: S$PBB,,, | Performed by: PODIATRIST

## 2020-07-14 PROCEDURE — 99214 OFFICE O/P EST MOD 30 MIN: CPT | Mod: PBBFAC | Performed by: PODIATRIST

## 2020-07-14 PROCEDURE — 99999 PR PBB SHADOW E&M-EST. PATIENT-LVL IV: CPT | Mod: PBBFAC,,, | Performed by: PODIATRIST

## 2020-07-14 PROCEDURE — 99203 PR OFFICE/OUTPT VISIT, NEW, LEVL III, 30-44 MIN: ICD-10-PCS | Mod: S$PBB,,, | Performed by: PODIATRIST

## 2020-07-14 RX ORDER — LEVOTHYROXINE SODIUM 50 UG/1
TABLET ORAL
COMMUNITY
Start: 2020-05-26 | End: 2020-09-08 | Stop reason: SDUPTHER

## 2020-07-14 NOTE — PROGRESS NOTES
Requested updates within Care Everywhere.  Patient's chart was reviewed for overdue YASH topics.  Immunizations not able to be reconciled.

## 2020-07-14 NOTE — LETTER
July 15, 2020      Braden Newby MD  [address] [facility] [place] [address]           [facility]  [address] [facility] [place] [address]  Phone: [phone]  Fax: [phone]          Patient: Mahsa Pedroza   MR Number: [id number]   YOB: 1934   Date of Visit: 7/14/2020       Dear Dr. Braden Newby:    Thank you for referring Mahsa Pedroza to me for evaluation. Attached you will find relevant portions of my assessment and plan of care.    If you have questions, please do not hesitate to call me. I look forward to following Mahsa Pedroza along with you.    Sincerely,    Kassi Reddy, DPM    Enclosure  CC:  No Recipients    If you would like to receive this communication electronically, please contact [email or web address] or [phone] to request more information on [employer] access.    For providers and/or their staff who would like to refer a patient to [facility], please contact us through our one-stop-shop provider referral line, [facility], at [phone].    If you feel you have received this communication in error or would no longer like to receive these types of communications, please e-mail [email or web address]

## 2020-07-15 NOTE — PROGRESS NOTES
Subjective:       Patient ID: Mahsa Pedroza is a 85 y.o. female.    Chief Complaint: Toe Pain and Nail Problem  Patient presents via referral Dr Newby with complaints of pain both big toes, sore to touch, not sure if it is due to the nail, no drainage.  Patient is 85, lives with family, limited mobility, not able to visualize her treat her feet very well.  She denies history of diabetes but does have sore, tingling pain in the left leg and foot due to problems with her left hip.  Takes gabapentin.    Past Medical History:   Diagnosis Date    Arthritis     Cataracts, bilateral     Osteopenia     Thyroid disease      Past Surgical History:   Procedure Laterality Date    CHOLECYSTECTOMY      DILATION AND CURETTAGE OF UTERUS      EYE SURGERY      GALLBLADDER SURGERY      HYSTEROSCOPIC SURGICAL PROCEDURE N/A 5/27/2019    Procedure: HYSTEROSCOPY, THERAPEUTIC;  Surgeon: Gordon Cedillo MD;  Location: [facility] OR;  Service: OB/GYN;  Laterality: N/A;  mysoure    HYSTEROSCOPY WITH DILATION AND CURETTAGE OF UTERUS N/A 5/27/2019    Procedure: HYSTEROSCOPY, WITH DILATION AND CURETTAGE OF UTERUS;  Surgeon: Gordon Cedillo MD;  Location: [facility] OR;  Service: OB/GYN;  Laterality: N/A;    JOINT REPLACEMENT      lamenectomy      TOTAL REPLACEMENT OF BOTH HIP JOINTS USING COMPUTER-ASSISTED NAVIGATION       History reviewed. No pertinent family history.  Social History     Socioeconomic History    Marital status:      Spouse name: Not on file    Number of children: Not on file    Years of education: Not on file    Highest education level: Not on file   Occupational History    Not on file   Social Needs    Financial resource strain: Not on file    Food insecurity     Worry: Not on file     Inability: Not on file    Transportation needs     Medical: Not on file     Non-medical: Not on file   Tobacco Use    Smoking status: Never Smoker    Smokeless tobacco: Never Used   Substance and Sexual  Activity    Alcohol use: Not Currently    Drug use: Never    Sexual activity: Not Currently   Lifestyle    Physical activity     Days per week: Not on file     Minutes per session: Not on file    Stress: Not on file   Relationships    Social connections     Talks on phone: Not on file     Gets together: Not on file     Attends Yazidi service: Not on file     Active member of club or organization: Not on file     Attends meetings of clubs or organizations: Not on file     Relationship status: Not on file   Other Topics Concern    Not on file   Social History Narrative    Not on file       Current Outpatient Medications   Medication Sig Dispense Refill    diclofenac sodium (VOLTAREN) 1 % Gel Apply 4 g topically 4 (four) times daily as needed. 100 g 1    estradioL (ESTRACE) 0.01 % (0.1 mg/gram) vaginal cream Place 1 g vaginally twice a week. 24 g 3    fluticasone propionate (FLOVENT DISKUS) 50 mcg/actuation DsDv Inhale into the lungs. Controller      gabapentin (NEURONTIN) 100 MG capsule Take 2 capsules (200 mg total) by mouth every evening. 60 capsule 11    hydrOXYzine pamoate (VISTARIL) 25 MG Cap Take 1 capsule (25 mg total) by mouth every 8 (eight) hours as needed (anxiety). 60 capsule 0    levothyroxine (SYNTHROID) 25 MCG tablet Take 25 mcg by mouth once daily.      levothyroxine (SYNTHROID) 50 MCG tablet       magnesium oxide (MAG-OX) 400 mg (241.3 mg magnesium) tablet Take 300 mg by mouth once daily.      meloxicam (MOBIC) 7.5 MG tablet Take 7.5 mg by mouth once daily.      PREMARIN vaginal cream       sertraline (ZOLOFT) 25 MG tablet Take 1 tablet (25 mg total) by mouth once daily. 30 tablet 11    traMADoL (ULTRAM) 50 mg tablet Take 1 tablet (50 mg total) by mouth every 8 (eight) hours as needed for Pain. 60 tablet 0     No current facility-administered medications for this visit.      Review of patient's allergies indicates:   Allergen Reactions    Adhesive     Morphine      "Oxymetazoline hcl     Sulfa (sulfonamide antibiotics)     Xylometazoline hcl        Review of Systems   Constitutional: Negative for fatigue.   HENT: Negative for congestion.    Respiratory: Negative for cough and shortness of breath.    Cardiovascular: Negative for leg swelling.   Musculoskeletal: Negative for gait problem.   All other systems reviewed and are negative.      Objective:      Vitals:    07/14/20 0912   BP: 138/72   Pulse: 79   Resp: 19   Temp: 98 °F (36.7 °C)   TempSrc: Oral   SpO2: 98%   Weight: 85.7 kg (188 lb 14.4 oz)   Height: 5' 4" (1.626 m)     Physical Exam  Vitals signs and nursing note reviewed.   Constitutional:       General: She is not in acute distress.     Appearance: Normal appearance.   Cardiovascular:      Pulses:           Dorsalis pedis pulses are 2+ on the right side and 2+ on the left side.        Posterior tibial pulses are 1+ on the right side and 1+ on the left side.   Pulmonary:      Effort: Pulmonary effort is normal.   Musculoskeletal:      Right foot: Decreased range of motion.      Left foot: Decreased range of motion.   Feet:      Right foot:      Protective Sensation: 4 sites tested. 4 sites sensed.      Toenail Condition: Right toenails are abnormally thick. Fungal disease present.     Left foot:      Protective Sensation: 4 sites tested. 4 sites sensed.      Skin integrity: Erythema present.      Toenail Condition: Left toenails are abnormally thick and ingrown. Fungal disease present.  Skin:     Capillary Refill: Capillary refill takes 2 to 3 seconds.   Neurological:      General: No focal deficit present.   Psychiatric:         Mood and Affect: Mood normal.       Vascular         Normal CFT bilateral   No lower extremity edema bilateral   Pedal skin temperature and color are normal bilateral     Integumentary   Both hallux nails are dystrophic and painful L>R. More tenting, detachment and subungual debris on the left causing ingrown nail on the medial and lateral " aspect.  Ranulfo nail plate is painful left greater than right.  Minimal erythema and edema left hallux, no calor or drainage.  No erythema, calor or drainage on the right  Soft skin texture with no skin breaks, bruises, abrasions bilateral feet        Neurological   Gross sensation intact bilateral feet.  Mild sural neuritis/neuropathy left    Musculoskeletal   Muscle Strength/Testing and Tone:  Intact, normal tone bilateral   Joints, Bones, and Muscles:  Limited  1st MPJ, midfoot, ankle range of motion bilateral        Presents in appropriate shoes       Assessment:       1. Ingrown nail of great toe of left foot    2. Acquired dysmorphic toenail    3. Toenail deformity    4. Onychomycosis of toenail    5. Pain of toes of both feet        Plan:           Discussed deformity of toenail, thickened, tented, evidence of fungal involvement and ingrown on both borders left great toe.  We reviewed signs of infection along with progressing signs of infection to monitor for.  Advised patient this may be worse on the left due to compensating for left hip pain and neuropathy.  Reviewed appropriate shoes, wide, soft material to event avoid pressure over both big toenails  Reviewed better maintenance of nails reducing thickness on a regular basis to prevent recurrence.  Instructed patient in a approximately a week once all discomfort has resolved start Vicks vapor rub or tea tree oil to the nail once daily, soak once weekly in warm water and Epson salt  Ingrown nail removed/debrided left hallux, reduced thickness right hallux.  Triple antibiotic ointment, gauze, coban applied left hallux. Patient is not a candidate for nail avulsion at this time.  Instructed patient to leave dressing intact until the morning, if sore apply small amount of antibiotic ointment and soft dressing during the day, soak in warm water and Epsom salts in the evening.  Instructed to perform this treatment daily until any remaining redness, swelling or  pain have resolved.  Explained any redness or swelling should be completely resolved and pain-free in a few days.  Instructed patient to contact the office with worsening symptoms or not pain-free in 1 week.  Patient was in understanding and agreement with treatment plan.  I counseled the patient on their conditions, implications and medical management.  Instructed patient/family to contact the office with any changes, questions, concerns, worsening of symptoms.   Total face-to-face time, exam, assessment, treatment, discussion, documentation 30 minutes, more than half this time spent on consultation and coordination of care.  Follow up as needed    This note was created using [employer] voice recognition software that occasionally misinterpreted phrases or words.

## 2020-07-30 ENCOUNTER — OFFICE VISIT (OUTPATIENT)
Dept: FAMILY MEDICINE | Facility: CLINIC | Age: 85
End: 2020-07-30
Payer: MEDICARE

## 2020-07-30 VITALS
HEIGHT: 64 IN | HEART RATE: 78 BPM | OXYGEN SATURATION: 98 % | RESPIRATION RATE: 16 BRPM | DIASTOLIC BLOOD PRESSURE: 71 MMHG | BODY MASS INDEX: 32.92 KG/M2 | WEIGHT: 192.81 LBS | SYSTOLIC BLOOD PRESSURE: 128 MMHG | TEMPERATURE: 97 F

## 2020-07-30 DIAGNOSIS — B35.1 ONYCHOMYCOSIS OF TOENAIL: Primary | ICD-10-CM

## 2020-07-30 DIAGNOSIS — M25.552 PAIN OF LEFT HIP JOINT: ICD-10-CM

## 2020-07-30 DIAGNOSIS — F41.9 ANXIETY: ICD-10-CM

## 2020-07-30 PROCEDURE — 99214 PR OFFICE/OUTPT VISIT, EST, LEVL IV, 30-39 MIN: ICD-10-PCS | Mod: S$GLB,,, | Performed by: FAMILY MEDICINE

## 2020-07-30 PROCEDURE — 99214 OFFICE O/P EST MOD 30 MIN: CPT | Mod: S$GLB,,, | Performed by: FAMILY MEDICINE

## 2020-07-30 RX ORDER — CICLOPIROX 80 MG/ML
SOLUTION TOPICAL NIGHTLY
Qty: 10 ML | Refills: 0 | Status: SHIPPED | OUTPATIENT
Start: 2020-07-30 | End: 2020-11-05

## 2020-07-30 NOTE — PROGRESS NOTES
[facility] - Clinic Note    Subjective      Ms. Pedroza is a 85 y.o. female who presents to clinic for Follow-up    Patient reports that with regard to her anxiety she has been doing well.  The Vistaril is helping.  She completed physical therapy for her hip.  She has been doing the exercises at home which has been helping.  She uses the Voltaren gel and tramadol as needed for pain.  She also has toenail fungus and has been doing the Vicks vapor rub would like a medication to try.    [facility] Mahsa has a past medical history of Arthritis, Cataracts, bilateral, Osteopenia, and Thyroid disease.   PSXH Mahsa has a past surgical history that includes Eye surgery; Joint replacement; lamenectomy; Total replacement of both hip joints using computer-assisted navigation; Gallbladder surgery; Dilation and curettage of uterus; Hysteroscopy with dilation and curettage of uterus (N/A, 5/27/2019); Hysteroscopic surgical procedure (N/A, 5/27/2019); and Cholecystectomy.    Mahsa's family history is not on file.    Mahsa reports that she has never smoked. She has never used smokeless tobacco. She reports previous alcohol use. She reports that she does not use drugs.   ALG Mahsa is allergic to adhesive; morphine; oxymetazoline hcl; sulfa (sulfonamide antibiotics); and xylometazoline hcl.   VAL Bragg has a current medication list which includes the following prescription(s): diclofenac sodium, fluticasone propionate, gabapentin, hydroxyzine pamoate, levothyroxine, meloxicam, tramadol, and ciclopirox.     Review of Systems   Constitutional: Negative for chills and fever.   Eyes: Negative for visual disturbance.   Cardiovascular: Negative for chest pain.   Gastrointestinal: Negative for abdominal pain.   Musculoskeletal: Positive for arthralgias.   Neurological: Negative for headaches.   Psychiatric/Behavioral: The patient is not nervous/anxious.      Objective     /71 (BP Location: Left arm, Patient Position:  "Sitting, BP Method: Large (Automatic))   Pulse 78   Temp 97 °F (36.1 °C) (Temporal)   Resp 16   Ht 5' 4" (1.626 m)   Wt 87.5 kg (192 lb 12.8 oz)   SpO2 98%   BMI 33.09 kg/m²     Physical Exam  Vitals signs and nursing note reviewed.   Constitutional:       General: She is not in acute distress.     Appearance: Normal appearance. She is well-developed. She is not diaphoretic.   HENT:      Head: Normocephalic and atraumatic.      Right Ear: External ear normal.      Left Ear: External ear normal.   Eyes:      General:         Right eye: No discharge.         Left eye: No discharge.   Cardiovascular:      Rate and Rhythm: Normal rate and regular rhythm.      Heart sounds: Normal heart sounds.   Pulmonary:      Effort: Pulmonary effort is normal.      Breath sounds: Normal breath sounds. No wheezing or rales.   Skin:     General: Skin is warm and dry.   Neurological:      Mental Status: She is alert and oriented to person, place, and time. Mental status is at baseline.   Psychiatric:         Mood and Affect: Mood normal.         Behavior: Behavior normal.         Thought Content: Thought content normal.         Judgment: Judgment normal.        Assessment/Plan     1. Onychomycosis of toenail  ciclopirox (PENLAC) 8 % Soln   2. Pain of left hip joint     3. Anxiety       Will trial Penlac for onychomycosis of the toenails.  Continue physical therapy and medications as prescribed for pain in the hip.  Continue Vistaril as needed for anxiety.  Follow-up in 3 months.    Future Appointments   Date Time Provider Department Center   11/5/2020 10:20 AM Braden Newby MD [facility]         Braden Newby MD  Family Medicine  [facility]            "

## 2020-08-06 ENCOUNTER — TELEPHONE (OUTPATIENT)
Dept: FAMILY MEDICINE | Facility: CLINIC | Age: 85
End: 2020-08-06

## 2020-08-26 ENCOUNTER — HOSPITAL ENCOUNTER (EMERGENCY)
Facility: HOSPITAL | Age: 85
Discharge: HOME | End: 2020-08-26
Attending: FAMILY MEDICINE
Payer: MEDICARE

## 2020-08-26 VITALS
TEMPERATURE: 98 F | OXYGEN SATURATION: 96 % | WEIGHT: 190 LBS | DIASTOLIC BLOOD PRESSURE: 71 MMHG | RESPIRATION RATE: 20 BRPM | BODY MASS INDEX: 32.44 KG/M2 | HEART RATE: 85 BPM | SYSTOLIC BLOOD PRESSURE: 136 MMHG | HEIGHT: 64 IN

## 2020-08-26 DIAGNOSIS — F41.9 ANXIETY: Primary | ICD-10-CM

## 2020-08-26 DIAGNOSIS — R52 PAIN: ICD-10-CM

## 2020-08-26 LAB
ALBUMIN SERPL BCP-MCNC: 4.1 G/DL (ref 3.5–5.2)
ALP SERPL-CCNC: 55 U/L (ref 55–135)
ALT SERPL W/O P-5'-P-CCNC: 23 U/L (ref 10–44)
ANION GAP SERPL CALC-SCNC: 11 MMOL/L (ref 8–16)
AST SERPL-CCNC: 28 U/L (ref 10–40)
BASOPHILS # BLD AUTO: 0.03 K/UL (ref 0–0.2)
BASOPHILS NFR BLD: 0.4 % (ref 0–1.9)
BILIRUB SERPL-MCNC: 0.9 MG/DL (ref 0.1–1)
BUN SERPL-MCNC: 14 MG/DL (ref 8–23)
CALCIUM SERPL-MCNC: 8.7 MG/DL (ref 8.7–10.5)
CHLORIDE SERPL-SCNC: 95 MMOL/L (ref 95–110)
CO2 SERPL-SCNC: 26 MMOL/L (ref 23–29)
CREAT SERPL-MCNC: 0.6 MG/DL (ref 0.5–1.4)
DIFFERENTIAL METHOD: NORMAL
EOSINOPHIL # BLD AUTO: 0.1 K/UL (ref 0–0.5)
EOSINOPHIL NFR BLD: 1.6 % (ref 0–8)
ERYTHROCYTE [DISTWIDTH] IN BLOOD BY AUTOMATED COUNT: 12.8 % (ref 11.5–14.5)
EST. GFR  (AFRICAN AMERICAN): >60 ML/MIN/1.73 M^2
EST. GFR  (NON AFRICAN AMERICAN): >60 ML/MIN/1.73 M^2
GLUCOSE SERPL-MCNC: 105 MG/DL (ref 70–110)
HCT VFR BLD AUTO: 39.6 % (ref 37–48.5)
HGB BLD-MCNC: 13.1 G/DL (ref 12–16)
IMM GRANULOCYTES # BLD AUTO: 0.04 K/UL (ref 0–0.04)
IMM GRANULOCYTES NFR BLD AUTO: 0.5 % (ref 0–0.5)
LYMPHOCYTES # BLD AUTO: 2.4 K/UL (ref 1–4.8)
LYMPHOCYTES NFR BLD: 31.3 % (ref 18–48)
MCH RBC QN AUTO: 29.1 PG (ref 27–31)
MCHC RBC AUTO-ENTMCNC: 33.1 G/DL (ref 32–36)
MCV RBC AUTO: 88 FL (ref 82–98)
MONOCYTES # BLD AUTO: 0.6 K/UL (ref 0.3–1)
MONOCYTES NFR BLD: 7.5 % (ref 4–15)
NEUTROPHILS # BLD AUTO: 4.5 K/UL (ref 1.8–7.7)
NEUTROPHILS NFR BLD: 58.7 % (ref 38–73)
NRBC BLD-RTO: 0 /100 WBC
PLATELET # BLD AUTO: 186 K/UL (ref 150–350)
PMV BLD AUTO: 9.5 FL (ref 9.2–12.9)
POTASSIUM SERPL-SCNC: 4.2 MMOL/L (ref 3.5–5.1)
PROT SERPL-MCNC: 7.1 G/DL (ref 6–8.4)
RBC # BLD AUTO: 4.5 M/UL (ref 4–5.4)
SODIUM SERPL-SCNC: 132 MMOL/L (ref 136–145)
TROPONIN I SERPL DL<=0.01 NG/ML-MCNC: 0.05 NG/ML (ref 0.02–0.5)
WBC # BLD AUTO: 7.71 K/UL (ref 3.9–12.7)

## 2020-08-26 PROCEDURE — 99285 EMERGENCY DEPT VISIT HI MDM: CPT | Mod: 25

## 2020-08-26 PROCEDURE — 85025 COMPLETE CBC W/AUTO DIFF WBC: CPT

## 2020-08-26 PROCEDURE — 71045 X-RAY EXAM CHEST 1 VIEW: CPT | Mod: TC,FY

## 2020-08-26 PROCEDURE — 71045 XR CHEST AP PORTABLE: ICD-10-PCS | Mod: 26,,, | Performed by: RADIOLOGY

## 2020-08-26 PROCEDURE — 84484 ASSAY OF TROPONIN QUANT: CPT

## 2020-08-26 PROCEDURE — 93005 ELECTROCARDIOGRAM TRACING: CPT

## 2020-08-26 PROCEDURE — 71045 X-RAY EXAM CHEST 1 VIEW: CPT | Mod: 26,,, | Performed by: RADIOLOGY

## 2020-08-26 PROCEDURE — 36415 COLL VENOUS BLD VENIPUNCTURE: CPT

## 2020-08-26 PROCEDURE — 80053 COMPREHEN METABOLIC PANEL: CPT

## 2020-08-26 NOTE — ED NOTES
Pt gave permission to speak with Janee Pedroza (daughter in law) for an update of care. Spoke with her by phone [phone]

## 2020-08-26 NOTE — ED PROVIDER NOTES
Encounter Date: 8/26/2020       History     Chief Complaint   Patient presents with    Anxiety    Leg Pain     bilateral     85-year-old female presents per EMS after having episode of heaviness in the chest tingling in the hands lump in the throat and numbness in the leg I think it may have been my anxiety the patient has a history of anxiety and takes Vistaril for this p.r.n. she has had some family stress recently but denies any unusual or new problems she does have history of restless leg syndrome and has trouble sleeping at night she denies any depression no suicidal ideation has no known coronary artery disease, she has recently moved here to live with her son and her daughter-in-law from the [facility], patient received 1 mg Ativan IV in the EMS unit prior to arrival on my approval per medic control call        Review of patient's allergies indicates:   Allergen Reactions    Adhesive Rash    Morphine     Oxymetazoline hcl Rash    Sulfa (sulfonamide antibiotics) Nausea And Vomiting    Xylometazoline hcl      Past Medical History:   Diagnosis Date    Arthritis     Cataracts, bilateral     Osteopenia     Thyroid disease      Past Surgical History:   Procedure Laterality Date    CHOLECYSTECTOMY      DILATION AND CURETTAGE OF UTERUS      EYE SURGERY      GALLBLADDER SURGERY      HYSTEROSCOPIC SURGICAL PROCEDURE N/A 5/27/2019    Procedure: HYSTEROSCOPY, THERAPEUTIC;  Surgeon: Gordon Cedillo MD;  Location: [facility] OR;  Service: OB/GYN;  Laterality: N/A;  mysoure    HYSTEROSCOPY WITH DILATION AND CURETTAGE OF UTERUS N/A 5/27/2019    Procedure: HYSTEROSCOPY, WITH DILATION AND CURETTAGE OF UTERUS;  Surgeon: Gordon Cedillo MD;  Location: [facility] OR;  Service: OB/GYN;  Laterality: N/A;    JOINT REPLACEMENT      lamenectomy      TOTAL REPLACEMENT OF BOTH HIP JOINTS USING COMPUTER-ASSISTED NAVIGATION       History reviewed. No pertinent family history.  Social History     Tobacco Use     Smoking status: Never Smoker    Smokeless tobacco: Never Used   Substance Use Topics    Alcohol use: Not Currently    Drug use: Never     Review of Systems   Constitutional: Negative for fever.   HENT: Negative for sore throat.    Respiratory: Negative for shortness of breath.    Cardiovascular: Negative for chest pain.   Gastrointestinal: Negative for nausea.   Genitourinary: Negative for dysuria.   Musculoskeletal: Negative for back pain.   Skin: Negative for rash.   Neurological: Negative for weakness.   Hematological: Does not bruise/bleed easily.   Psychiatric/Behavioral: Positive for sleep disturbance. Negative for suicidal ideas. The patient is nervous/anxious.        Physical Exam     Initial Vitals [08/26/20 1655]   BP Pulse Resp Temp SpO2   (!) 166/81 84 18 98.4 °F (36.9 °C) 98 %      MAP       --         Physical Exam    Nursing note and vitals reviewed.  Constitutional: She appears well-developed and well-nourished. She is not diaphoretic. No distress.   HENT:   Head: Normocephalic and atraumatic.   Right Ear: External ear normal.   Left Ear: External ear normal.   Eyes: Pupils are equal, round, and reactive to light. Right eye exhibits no discharge. Left eye exhibits no discharge.   Neck: No tracheal deviation present. No JVD present.   Cardiovascular: Exam reveals no friction rub.    No murmur heard.  Pulmonary/Chest: No stridor. No respiratory distress. She has no wheezes. She has no rales.   Abdominal: Bowel sounds are normal. She exhibits no distension.   Musculoskeletal: Normal range of motion.   Neurological: She is alert.   Skin: Skin is warm.   Psychiatric: She has a normal mood and affect.         ED Course   Procedures  Labs Reviewed   COMPREHENSIVE METABOLIC PANEL - Abnormal; Notable for the following components:       Result Value    Sodium 132 (*)     All other components within normal limits   CBC W/ AUTO DIFFERENTIAL   TROPONIN I          Imaging Results          X-Ray Chest AP  Portable (Final result)  Result time 08/26/20 18:35:38    Final result by Atiya Haywood MD (08/26/20 18:35:38)                 Impression:      No acute cardiopulmonary disease      Electronically signed by: Atiya Haywood MD  Date:    08/26/2020  Time:    18:35             Narrative:    EXAMINATION:  XR CHEST AP PORTABLE    CLINICAL HISTORY:  pain;    TECHNIQUE:  Single frontal view of the chest was performed.    COMPARISON:  03/15/2020    FINDINGS:  The cardiomediastinal silhouette appears stable.  The lungs are clear of infiltrate.  There is no pleural effusion.  Bony thorax to extent visualized on the single view of the chest appears intact                                                                 Clinical Impression:       ICD-10-CM ICD-9-CM   1. Anxiety  F41.9 300.00   2. Pain  R52 780.96             ED Disposition Condition    Discharge Stable        ED Prescriptions     None        Follow-up Information    None                                    Hugo Sullivan MD  08/27/20 2141

## 2020-08-31 ENCOUNTER — TELEPHONE (OUTPATIENT)
Dept: FAMILY MEDICINE | Facility: CLINIC | Age: 85
End: 2020-08-31

## 2020-08-31 NOTE — TELEPHONE ENCOUNTER
attempted to call pt, no answer, left message to return the call.      ----- Message from Braden Newby MD sent at 8/31/2020 11:51 AM CDT -----  We could continue the Vistaril and continue to monitor or we could try another medication called BuSpar which she would have to take twice a day and not as needed.  Please let me know.  ----- Message -----  From: Regina Quiros LPN  Sent: 8/31/2020  11:36 AM CDT  To: Braden Newby MD    Medication working at [facility] but had an attack few days ago the medication did not work.    Please advise   ----- Message -----  From: Lalo Anaya  Sent: 8/31/2020   9:14 AM CDT  To: Keyonna Penn Staff    Type: Needs Medical Advice  Who Called:  Patient    Pharmacy name and phone #:    Best Call Back Number: [phone]  Additional Information: Patient states that she had a panic attack recently and her medication didn't work.  hydrOXYzine pamoate (VISTARIL) 25 MG Cap    Please call to advise

## 2020-09-01 NOTE — TELEPHONE ENCOUNTER
We could continue the Vistaril and continue to monitor , pt states she will take the medication at a different time and see how it works.

## 2020-09-08 RX ORDER — MELOXICAM 7.5 MG/1
7.5 TABLET ORAL DAILY PRN
Qty: 90 TABLET | Refills: 3 | Status: SHIPPED | OUTPATIENT
Start: 2020-09-08 | End: 2021-07-01

## 2020-09-08 RX ORDER — LEVOTHYROXINE SODIUM 50 UG/1
50 TABLET ORAL
Qty: 90 TABLET | Refills: 3 | Status: SHIPPED | OUTPATIENT
Start: 2020-09-08 | End: 2021-07-01

## 2020-09-08 NOTE — TELEPHONE ENCOUNTER
----- Message from Wojciech Dye sent at 9/8/2020  2:08 PM CDT -----  Type:  RX Refill Request    Who Called:  Patient  Refill or New Rx:  Refill  RX Name and Strength:    1. meloxicam (MOBIC) 7.5 MG tablet  2. levothyroxine (SYNTHROID) 50 MCG tablet  How is the patient currently taking it? (ex. 1XDay):  As ordered  Is this a 30 day or 90 day RX:  90  Preferred Pharmacy with phone number:    [facility] - [place], [place] - [address] [facility]  [address] [address]  Phone: [phone] Fax: [phone]  Local or Mail Order:  Mail Order  Ordering Provider:  Dr. Newby  Best Call Back Number:  [phone]  Additional Information: Patient requested this refill over a week ago and has still yet to hear anything in regards to refilling it. Please call to advise. Thanks!

## 2020-09-22 ENCOUNTER — TELEPHONE (OUTPATIENT)
Dept: FAMILY MEDICINE | Facility: CLINIC | Age: 85
End: 2020-09-22

## 2020-09-22 NOTE — TELEPHONE ENCOUNTER
----- Message from Rony Sanches sent at 9/22/2020  1:37 PM CDT -----  Contact: patient  Patient called in and stated nurse told her where to go for her Ultrasound tomorrow (not in system) and patient was calling back to confirm?  Patient does have appointment with Dr. Newby tomorrow at 1pm 9/23/20.    Patient call back number is [phone]

## 2020-09-22 NOTE — TELEPHONE ENCOUNTER
Spoke with patient, advised patient to discuss with Dr. Newby about a Wrist Xray tomorrow at her appointment, if he orders we can schedule while she is at the office. Patient v/u.

## 2020-09-23 ENCOUNTER — OFFICE VISIT (OUTPATIENT)
Dept: FAMILY MEDICINE | Facility: CLINIC | Age: 85
End: 2020-09-23
Payer: MEDICARE

## 2020-09-23 VITALS
HEART RATE: 76 BPM | TEMPERATURE: 97 F | SYSTOLIC BLOOD PRESSURE: 114 MMHG | HEIGHT: 64 IN | WEIGHT: 190 LBS | RESPIRATION RATE: 17 BRPM | OXYGEN SATURATION: 96 % | BODY MASS INDEX: 32.44 KG/M2 | DIASTOLIC BLOOD PRESSURE: 63 MMHG

## 2020-09-23 DIAGNOSIS — M25.552 PAIN OF LEFT HIP JOINT: ICD-10-CM

## 2020-09-23 DIAGNOSIS — M25.531 RIGHT WRIST PAIN: Primary | ICD-10-CM

## 2020-09-23 DIAGNOSIS — F41.9 ANXIETY: ICD-10-CM

## 2020-09-23 PROCEDURE — 99214 OFFICE O/P EST MOD 30 MIN: CPT | Mod: S$GLB,,, | Performed by: FAMILY MEDICINE

## 2020-09-23 PROCEDURE — 99214 PR OFFICE/OUTPT VISIT, EST, LEVL IV, 30-39 MIN: ICD-10-PCS | Mod: S$GLB,,, | Performed by: FAMILY MEDICINE

## 2020-09-24 ENCOUNTER — HOSPITAL ENCOUNTER (OUTPATIENT)
Dept: RADIOLOGY | Facility: HOSPITAL | Age: 85
Discharge: HOME | End: 2020-09-24
Attending: FAMILY MEDICINE
Payer: MEDICARE

## 2020-09-24 DIAGNOSIS — M25.531 RIGHT WRIST PAIN: ICD-10-CM

## 2020-09-24 PROCEDURE — 76882 US LMTD JT/FCL EVL NVASC XTR: CPT | Mod: TC,RT

## 2020-09-24 PROCEDURE — 76882 US EXTREMITY NON VASCULAR LIMITED RIGHT: ICD-10-PCS | Mod: 26,RT,, | Performed by: RADIOLOGY

## 2020-09-24 PROCEDURE — 76882 US LMTD JT/FCL EVL NVASC XTR: CPT | Mod: 26,RT,, | Performed by: RADIOLOGY

## 2020-09-25 ENCOUNTER — TELEPHONE (OUTPATIENT)
Dept: FAMILY MEDICINE | Facility: CLINIC | Age: 85
End: 2020-09-25

## 2020-09-25 NOTE — TELEPHONE ENCOUNTER
09/25/2020 6:20pm-- notified patient of Dr Newby's note. She has the splint and has been using it, she said it doesn't help and wants to think about the ortho referral. She will let us know.

## 2020-09-25 NOTE — TELEPHONE ENCOUNTER
The ultrasound did not show any abnormalities in the area that was affected.  It is unclear what the swelling in that area is.  I think the next step now is either a referral to orthopedics for further evaluation or using a wrist splint called a neutral wrist splint which you can get at the drugstore or online.

## 2020-09-25 NOTE — TELEPHONE ENCOUNTER
Patient requesting results on US.   Thanks      ----- Message from Tai Land sent at 9/25/2020 11:29 AM CDT -----  Contact: patient  Type: Needs Medical Advice  Who Called:  patient  Symptoms (please be specific):    How long has patient had these symptoms:    Pharmacy name and phone #:   Best Call Back Number: [phone]  Additional Information: requesting a call back regarding test results

## 2020-09-29 ENCOUNTER — PATIENT MESSAGE (OUTPATIENT)
Dept: FAMILY MEDICINE | Facility: CLINIC | Age: 85
End: 2020-09-29

## 2020-10-08 ENCOUNTER — TELEPHONE (OUTPATIENT)
Dept: FAMILY MEDICINE | Facility: CLINIC | Age: 85
End: 2020-10-08

## 2020-10-08 ENCOUNTER — CLINICAL SUPPORT (OUTPATIENT)
Dept: FAMILY MEDICINE | Facility: CLINIC | Age: 85
End: 2020-10-08
Payer: MEDICARE

## 2020-10-08 DIAGNOSIS — Z23 NEED FOR IMMUNIZATION AGAINST INFLUENZA: ICD-10-CM

## 2020-10-08 DIAGNOSIS — Z23 NEED FOR IMMUNIZATION AGAINST INFLUENZA: Primary | ICD-10-CM

## 2020-10-08 PROCEDURE — G0008 FLU VACCINE - QUADRIVALENT - ADJUVANTED: ICD-10-PCS | Mod: S$GLB,,, | Performed by: FAMILY MEDICINE

## 2020-10-08 PROCEDURE — 90694 VACC AIIV4 NO PRSRV 0.5ML IM: CPT | Mod: S$GLB,,, | Performed by: FAMILY MEDICINE

## 2020-10-08 PROCEDURE — 90694 FLU VACCINE - QUADRIVALENT - ADJUVANTED: ICD-10-PCS | Mod: S$GLB,,, | Performed by: FAMILY MEDICINE

## 2020-10-08 PROCEDURE — G0008 ADMIN INFLUENZA VIRUS VAC: HCPCS | Mod: S$GLB,,, | Performed by: FAMILY MEDICINE

## 2020-10-19 ENCOUNTER — PATIENT OUTREACH (OUTPATIENT)
Dept: ADMINISTRATIVE | Facility: OTHER | Age: 85
End: 2020-10-19

## 2020-10-19 NOTE — PROGRESS NOTES
Chart was reviewed for overdue [facility]active [facility] Encounters (Y[facility])  topics  Updates were unable to be requested from care everywhere  Health Maintenance has been updated  LINKS immunization registry triggered

## 2020-10-21 ENCOUNTER — OFFICE VISIT (OUTPATIENT)
Dept: PAIN MEDICINE | Facility: CLINIC | Age: 85
End: 2020-10-21
Payer: MEDICARE

## 2020-10-21 VITALS
HEART RATE: 71 BPM | RESPIRATION RATE: 16 BRPM | WEIGHT: 192.88 LBS | HEIGHT: 64 IN | TEMPERATURE: 98 F | DIASTOLIC BLOOD PRESSURE: 71 MMHG | SYSTOLIC BLOOD PRESSURE: 161 MMHG | OXYGEN SATURATION: 97 % | BODY MASS INDEX: 32.93 KG/M2

## 2020-10-21 DIAGNOSIS — M96.1 POSTLAMINECTOMY SYNDROME OF LUMBAR REGION: ICD-10-CM

## 2020-10-21 DIAGNOSIS — M70.62 GREATER TROCHANTERIC BURSITIS OF LEFT HIP: Primary | ICD-10-CM

## 2020-10-21 DIAGNOSIS — M25.552 PAIN OF LEFT HIP JOINT: ICD-10-CM

## 2020-10-21 DIAGNOSIS — M51.36 DDD (DEGENERATIVE DISC DISEASE), LUMBAR: ICD-10-CM

## 2020-10-21 PROCEDURE — 20610 LARGE JOINT ASPIRATION/INJECTION: L GREATER TROCHANTERIC BURSA: ICD-10-PCS | Mod: S$PBB,LT,, | Performed by: ANESTHESIOLOGY

## 2020-10-21 PROCEDURE — 20610 DRAIN/INJ JOINT/BURSA W/O US: CPT | Mod: PBBFAC,PO | Performed by: ANESTHESIOLOGY

## 2020-10-21 PROCEDURE — 99204 PR OFFICE/OUTPT VISIT, NEW, LEVL IV, 45-59 MIN: ICD-10-PCS | Mod: S$PBB,25,, | Performed by: ANESTHESIOLOGY

## 2020-10-21 PROCEDURE — 99999 PR PBB SHADOW E&M-EST. PATIENT-LVL III: ICD-10-PCS | Mod: PBBFAC,,, | Performed by: ANESTHESIOLOGY

## 2020-10-21 PROCEDURE — 99999 PR PBB SHADOW E&M-EST. PATIENT-LVL III: CPT | Mod: PBBFAC,,, | Performed by: ANESTHESIOLOGY

## 2020-10-21 PROCEDURE — 99213 OFFICE O/P EST LOW 20 MIN: CPT | Mod: PBBFAC,PO | Performed by: ANESTHESIOLOGY

## 2020-10-21 PROCEDURE — 99204 OFFICE O/P NEW MOD 45 MIN: CPT | Mod: S$PBB,25,, | Performed by: ANESTHESIOLOGY

## 2020-10-21 RX ORDER — METHYLPREDNISOLONE ACETATE 80 MG/ML
40 INJECTION, SUSPENSION INTRA-ARTICULAR; INTRALESIONAL; INTRAMUSCULAR; SOFT TISSUE
Status: DISCONTINUED | OUTPATIENT
Start: 2020-10-21 | End: 2020-10-21 | Stop reason: HOSPADM

## 2020-10-21 RX ADMIN — METHYLPREDNISOLONE ACETATE 40 MG: 80 INJECTION, SUSPENSION INTRA-ARTICULAR; INTRALESIONAL; INTRAMUSCULAR; SOFT TISSUE at 01:10

## 2020-10-21 NOTE — PROCEDURES
Large Joint Aspiration/Injection: L greater trochanteric bursa    Date/Time: 10/21/2020 1:40 PM  Performed by: Jim Ugalde MD  Authorized by: Jim Ugalde MD     Consent Done?:  Yes (Written)  Indications:  Pain  Timeout: prior to procedure the correct patient, procedure, and site was verified    Prep: patient was prepped and draped in usual sterile fashion      Local anesthesia used?: Yes (lidocaine spray used for topicalization)    Local anesthetic:  Bupivacaine 0.5% without epinephrine    Details:  Needle Size:  25 G  Approach:  Lateral  Location:  Hip  Site:  L greater trochanteric bursa  Medications:  40 mg methylPREDNISolone acetate 80 mg/mL  Patient tolerance:  Patient tolerated the procedure well with no immediate complications     80 mg of depo-medrol diluted in 4 mL of 0.5% bupivacaine

## 2020-10-21 NOTE — PROGRESS NOTES
"  Referring Physician: Braden Newby MD    PCP: Braden Newby MD    CC: left hip pain    HPI:   Mahsa Pedroza is a 86 y.o. female with PMH significant for anxiety on hydroxyzine, hx of bilateral hip replacements, and hx of lumbar laminectomy (approximately 10 years ago) presents as a referral for left hip pain. The patient reports that her pain began approximately 6 months ago when she was carrying a basket of laundry and she tripped and fell onto her hip. The patient localizes her pain to the lateral aspect of her left hip. The patient reports of radiation to her left groin and tailbone. The patient describes her pain as an aching type of pain. The patient reports of numbness in her bilateral lower extremities. The patient reports that her pain is a 3-4/10. Patient denies of any urinary/fecal incontinence, saddle anesthesia, or weakness.     She is requesting "stronger pain medication" to help her manage her pain. Of note, the patient has rather significant anxiety which made it rather difficult for her to focus throughout her consultation.     Aggravating factors: walking, has pain as rest as well    Mitigating factors: medicine, changing position    Relevant Surgeries: yes; hx of lumbar spine surgery    Interventional Therapies: yes; reports of injections in her left hip    : Not applicable      Non-pharmacologic Treatment:     · Physical Therapy: yes; last did a couple months ago - reports of some benefit   · Ice/Heat: yes; heating pad  · TENS: no  · Massage: no  · Chiropractic care: no  · Acupuncture: no         Pain Medications:         · Currently taking: meloxicam 7.5 mg PO q day (some benefit), gabapentin 200 mg PO QHS (reports of good benefit in regards to numbness), OTC Tylenol PRN    · Has tried in the past:    · Opioids: yes; tramadol - stopped secondary to no benefit   · NSAIDS: yes  · Tylenol: yes  · Muscle relaxants: no  · TCAs: no  · SNRIs: no  · Anticonvulsants: " "yes  · topical creams: yes; uses a magnesium spray for "cramps" with benefit     Anticoagulation: no    ROS:  Review of Systems   Constitutional: Negative for chills and fever.   HENT: Negative for sore throat.    Eyes: Negative for visual disturbance.   Respiratory: Negative for shortness of breath.    Cardiovascular: Negative for chest pain.   Gastrointestinal: Negative for nausea and vomiting.   Genitourinary: Negative for difficulty urinating.   Musculoskeletal: Positive for arthralgias.   Skin: Negative for rash.   Allergic/Immunologic: Negative for immunocompromised state.   Neurological: Positive for numbness. Negative for syncope.   Hematological: Does not bruise/bleed easily.   Psychiatric/Behavioral: Negative for suicidal ideas. The patient is nervous/anxious.         Past Medical History:   Diagnosis Date    Arthritis     Cataracts, bilateral     Osteopenia     Thyroid disease      Past Surgical History:   Procedure Laterality Date    CHOLECYSTECTOMY      DILATION AND CURETTAGE OF UTERUS      EYE SURGERY      GALLBLADDER SURGERY      HYSTEROSCOPIC SURGICAL PROCEDURE N/A 5/27/2019    Procedure: HYSTEROSCOPY, THERAPEUTIC;  Surgeon: Gordon Cedillo MD;  Location: [facility];  Service: OB/GYN;  Laterality: N/A;  mysoure    HYSTEROSCOPY WITH DILATION AND CURETTAGE OF UTERUS N/A 5/27/2019    Procedure: HYSTEROSCOPY, WITH DILATION AND CURETTAGE OF UTERUS;  Surgeon: Gordon Cedillo MD;  Location: [facility];  Service: OB/GYN;  Laterality: N/A;    JOINT REPLACEMENT      lamenectomy      TOTAL REPLACEMENT OF BOTH HIP JOINTS USING COMPUTER-ASSISTED NAVIGATION       No family history on file.  Social History     Socioeconomic History    Marital status:      Spouse name: Not on file    Number of children: Not on file    Years of education: Not on file    Highest education level: Not on file   Occupational History    Not on file   Social Needs    Financial resource strain: Not on file    " "Food insecurity     Worry: Not on file     Inability: Not on file    Transportation needs     Medical: Not on file     Non-medical: Not on file   Tobacco Use    Smoking status: Never Smoker    Smokeless tobacco: Never Used   Substance and Sexual Activity    Alcohol use: Not Currently    Drug use: Never    Sexual activity: Not Currently   Lifestyle    Physical activity     Days per week: Not on file     Minutes per session: Not on file    Stress: Not on file   Relationships    Social connections     Talks on phone: Not on file     Gets together: Not on file     Attends Faith service: Not on file     Active member of club or organization: Not on file     Attends meetings of clubs or organizations: Not on file     Relationship status: Not on file   Other Topics Concern    Not on file   Social History Narrative    Not on file         Allergies: See med card    Vitals:    10/21/20 1335   BP: (!) 161/71   Pulse: 71   Resp: 16   Temp: 98.3 °F (36.8 °C)   TempSrc: Temporal   SpO2: 97%   Weight: 87.5 kg (192 lb 14.4 oz)   Height: 5' 4" (1.626 m)   PainSc:   6   PainLoc: Hip         Physical exam:  Physical Exam   Constitutional: She is oriented to person, place, and time and well-developed, well-nourished, and in no distress.   HENT:   Head: Normocephalic and atraumatic.   Eyes: Conjunctivae and EOM are normal. Right eye exhibits no discharge. Left eye exhibits no discharge.   Cardiovascular: Normal rate.   Pulmonary/Chest: Effort normal and breath sounds normal. No respiratory distress.   Abdominal: Soft.   Neurological: She is alert and oriented to person, place, and time.   Skin: Skin is warm and dry. No rash noted. She is not diaphoretic.   Psychiatric: Mood, memory, affect and judgment normal.   Nursing note and vitals reviewed.       UPPER EXTREMITIES: Normal alignment, normal range of motion, no atrophy, no skin changes,  hair growth and nail growth normal and equal bilaterally. No swelling, no " tenderness.    LOWER EXTREMITIES:  Normal alignment, normal range of motion, no atrophy, no skin changes,  hair growth and nail growth normal and equal bilaterally. No swelling, no tenderness.    LUMBAR SPINE  Lumbar spine: ROM is limited with flexion extension and oblique extension with increased pain with passive ROM.     ((+)) Supine straight leg raise on the left   ((+)) Facet loading   Internal and external rotation of the hip causes increased pain on the left side. TTP at the site of the bilateral greater trochanter  Myofascial exam: Tenderness to palpation across lumbar paraspinous muscles. Prior midline scar is well-healed    ((+)) TTP at the SI joint on the left side  CHARLY's test: unable to tolerate secondary to pain  One leg stand: unable to perform secondary to fall risk    Distraction test: unable to tolerate secondary to pain    CRANIAL NERVES:  II:  PERRL bilaterally,   III,IV,VI: EOMI.    V:  Facial sensation equal bilaterally  VII:  Facial motor function normal.  VIII:  Hearing equal to finger rub bilaterally  IX/X: Gag normal, palate symmetric  XI:  Shoulder shrug equal, head turn equal  XII:  Tongue midline without fasciculations      MOTOR: Tone and bulk: normal bilateral upper and lower Strength: normal   Delt Bi Tri WE WF     R 5 5 5 5 5 5   L 5 5 5 5 5 5     IP ADD ABD Quad TA Gas HAM  R 5 5 5 5 5 5 5  L 5 5 5 5 5 5 5    SENSATION: Light touch and pinprick intact bilaterally  REFLEXES: normal, symmetric, nonbrisk.  Toes down, no clonus. Negative eagle's sign bilaterally.  GAIT: normal rise, base, steps, and arm swing.        Imaging: X-ray bilateral hips and pelvis (12/23/2019):  Previous bilateral total hip arthroplasty.  Normal anatomic alignment.  No plain film evidence to suggest hardware failure.     Pubic symphysis, bilateral pubic rami and SI joints are intact.     There is mild bone demineralization.    Assessment: Mahsa Pedroza is a 86 y.o. female with PMH significant for  anxiety on hydroxyzine, hx of bilateral hip replacements, and hx of lumbar laminectomy (approximately 10 years ago) presents as a referral for left hip pain. Physical examination significant for reproducible pain with palpation at the site of the left greater trochanter. I also expect her lumbar spine is contributing to her current condition to some degree given her previous history of lumbar spine surgery. However, the patient does not want any interventions on her spine. Prior plain film of the bilateral hips was not significant for any obvious hardware failure. Treatment plan outlined below.     Plan:  - Performed left greater trochanteric bursa injection in clinic today  - We had an extensive conversation about chronic opioid use for pain management. I explained to the patient that I do not recommend long term opioid therapy. Patient counseled about the side effects of long term use of opioids including but not limited to: dependence, tolerance, addiction, respiratory depression, somnolence, and immune/endocrine dysfunction. Specifically for this patient, I explained very clearly that I do not recommend chronic opioids for the management of her pain given her age and significant anxiety. This was explained to the patient.   - Continue meloxicam 7.5 mg PO q day (some benefit), gabapentin 200 mg PO QHS (reports of good benefit in regards to numbness), OTC Tylenol PRN as prescribed by her PCP  - I have stressed the importance of physical activity and a home exercise plan to help with chronic pain and improve health.  - I discussed the option of evaluate her lumbar spine as a potential pain generator. The patient will consider. The patient would struggle in regards to getting a MRI given her anxiety. She is also very concerned in regards to spine interventions. I explained that I was offering the patient an option, and it will be ultimately up to the patient as to whether she wants to proceed.   - RTC in 6 weeks for  follow-up    Thank you for referring this interesting patient, and I look forward to continuing to collaborate in her care.    Jim Ugalde MD  Pain Management

## 2020-11-05 ENCOUNTER — OFFICE VISIT (OUTPATIENT)
Dept: FAMILY MEDICINE | Facility: CLINIC | Age: 85
End: 2020-11-05
Payer: MEDICARE

## 2020-11-05 VITALS
BODY MASS INDEX: 32.13 KG/M2 | HEIGHT: 64 IN | OXYGEN SATURATION: 97 % | WEIGHT: 188.19 LBS | DIASTOLIC BLOOD PRESSURE: 72 MMHG | TEMPERATURE: 98 F | RESPIRATION RATE: 16 BRPM | HEART RATE: 84 BPM | SYSTOLIC BLOOD PRESSURE: 127 MMHG

## 2020-11-05 DIAGNOSIS — F41.9 ANXIETY: Primary | ICD-10-CM

## 2020-11-05 PROCEDURE — 99214 OFFICE O/P EST MOD 30 MIN: CPT | Mod: S$GLB,,, | Performed by: FAMILY MEDICINE

## 2020-11-05 PROCEDURE — 99214 PR OFFICE/OUTPT VISIT, EST, LEVL IV, 30-39 MIN: ICD-10-PCS | Mod: S$GLB,,, | Performed by: FAMILY MEDICINE

## 2020-11-05 RX ORDER — DULOXETIN HYDROCHLORIDE 30 MG/1
30 CAPSULE, DELAYED RELEASE ORAL DAILY
Qty: 30 CAPSULE | Refills: 11 | Status: SHIPPED | OUTPATIENT
Start: 2020-11-05 | End: 2021-07-08

## 2020-11-05 RX ORDER — CLONIDINE HYDROCHLORIDE 0.1 MG/1
0.1 TABLET ORAL DAILY PRN
Qty: 30 TABLET | Refills: 1 | Status: SHIPPED | OUTPATIENT
Start: 2020-11-05 | End: 2021-08-24

## 2020-11-05 NOTE — PROGRESS NOTES
"[facility] - Clinic Note    Subjective      Ms. Pedroza is a 86 y.o. female who presents to clinic for Follow-up and Hip Pain (L side)    Patient reports that she continues to have issues with anxiety.  She has had a few panic attacks.  The hydroxyzine help sometimes but not all the time.  Her blood pressure goes up at these episodes.  She states that the hip injection helped initially but over the last few days since she has been on it more it has been hurting.    [facility] Mahsa has a past medical history of Arthritis, Cataracts, bilateral, Osteopenia, and Thyroid disease.   PSXH Mahsa has a past surgical history that includes Eye surgery; Joint replacement; lamenectomy; Total replacement of both hip joints using computer-assisted navigation; Gallbladder surgery; Dilation and curettage of uterus; Hysteroscopy with dilation and curettage of uterus (N/A, 5/27/2019); Hysteroscopic surgical procedure (N/A, 5/27/2019); and Cholecystectomy.    Mahsa's family history is not on file.    Mahsa reports that she has never smoked. She has never used smokeless tobacco. She reports previous alcohol use. She reports that she does not use drugs.   BRISA Bragg is allergic to adhesive; morphine; oxymetazoline hcl; sulfa (sulfonamide antibiotics); and xylometazoline hcl.   VAL Bragg has a current medication list which includes the following prescription(s): fluticasone propionate, gabapentin, hydroxyzine pamoate, levothyroxine, meloxicam, clonidine, and duloxetine.     Review of Systems   Constitutional: Negative for chills and fever.   Musculoskeletal: Positive for arthralgias.   Psychiatric/Behavioral: The patient is nervous/anxious.      Objective     /72 (BP Location: Right arm, Patient Position: Sitting, BP Method: Large (Automatic))   Pulse 84   Temp 97.7 °F (36.5 °C) (Temporal)   Resp 16   Ht 5' 4" (1.626 m)   Wt 85.4 kg (188 lb 3.2 oz)   SpO2 97%   BMI 32.30 kg/m²     Physical Exam  Vitals signs and " nursing note reviewed.   Constitutional:       General: She is not in acute distress.     Appearance: Normal appearance. She is well-developed. She is not diaphoretic.   HENT:      Head: Normocephalic and atraumatic.      Right Ear: External ear normal.      Left Ear: External ear normal.   Eyes:      General:         Right eye: No discharge.         Left eye: No discharge.   Cardiovascular:      Rate and Rhythm: Normal rate and regular rhythm.      Heart sounds: Normal heart sounds.   Pulmonary:      Effort: Pulmonary effort is normal.      Breath sounds: Normal breath sounds. No wheezing or rales.   Skin:     General: Skin is warm and dry.   Neurological:      Mental Status: She is alert and oriented to person, place, and time. Mental status is at baseline.   Psychiatric:         Mood and Affect: Mood normal.         Behavior: Behavior normal.         Thought Content: Thought content normal.         Judgment: Judgment normal.        Assessment/Plan     1. Anxiety  DULoxetine (CYMBALTA) 30 MG capsule    cloNIDine (CATAPRES) 0.1 MG tablet     For anxiety will start Cymbalta 30 mg daily to see if that helps with both chronic pain and anxiety.  Clonidine as needed for blood pressures greater than 160/90.  Follow-up in 2 months.    Future Appointments   Date Time Provider Department Center   12/2/2020  2:20 PM Jim Ugalde MD [facility]   1/7/2021  9:40 AM Braden Newby MD [facility]         Braden Newby MD  Family Medicine  [facility]

## 2020-11-06 ENCOUNTER — TELEPHONE (OUTPATIENT)
Dept: FAMILY MEDICINE | Facility: CLINIC | Age: 85
End: 2020-11-06

## 2020-11-06 ENCOUNTER — NURSE TRIAGE (OUTPATIENT)
Dept: ADMINISTRATIVE | Facility: CLINIC | Age: 85
End: 2020-11-06

## 2020-11-06 NOTE — TELEPHONE ENCOUNTER
Spoke with patient she states she was started Cymbalta today.  Patient states she wants to know if she can also take hydroxyzine pamoate (vistaril)  If she feels Cymbalta is not working.  She is currently taking Cymbalta 30 mg.  The hydroxyzine pamoate dose is 25 mg.   Spoke with Dr. Newby via secure chat he states it is fine that patient takes both medications.      Reason for Disposition   [1] Caller has URGENT medication question about med that PCP or specialist prescribed AND [2] triager unable to answer question    Protocols used: MEDICATION QUESTION CALL-A-

## 2020-11-06 NOTE — TELEPHONE ENCOUNTER
----- Message from Rony Sanches sent at 11/6/2020  8:32 AM CST -----  Contact: patient  Patient called in and wanted to know if her Rx for DULoxetine (CYMBALTA) 30 MG capsule is not enough to help during the day can she also take a hydrOXYzine pamoate (VISTARIL) 25 MG Cap?    Patient would like a call back [phone]

## 2020-11-09 ENCOUNTER — TELEPHONE (OUTPATIENT)
Dept: FAMILY MEDICINE | Facility: CLINIC | Age: 85
End: 2020-11-09

## 2020-11-09 NOTE — TELEPHONE ENCOUNTER
Attempted to call the patient, but didn't get an answer and couldn't LM. I'll try again at a later time.

## 2020-11-10 ENCOUNTER — TELEPHONE (OUTPATIENT)
Dept: FAMILY MEDICINE | Facility: CLINIC | Age: 85
End: 2020-11-10

## 2020-11-23 DIAGNOSIS — R20.0 NUMBNESS: ICD-10-CM

## 2020-11-23 RX ORDER — GABAPENTIN 100 MG/1
100 CAPSULE ORAL 3 TIMES DAILY
Qty: 90 CAPSULE | Refills: 11 | Status: SHIPPED | OUTPATIENT
Start: 2020-11-23 | End: 2021-07-27 | Stop reason: SDUPTHER

## 2020-12-01 ENCOUNTER — PATIENT OUTREACH (OUTPATIENT)
Dept: ADMINISTRATIVE | Facility: OTHER | Age: 85
End: 2020-12-01

## 2020-12-02 ENCOUNTER — OFFICE VISIT (OUTPATIENT)
Dept: PAIN MEDICINE | Facility: CLINIC | Age: 85
End: 2020-12-02
Payer: MEDICARE

## 2020-12-02 VITALS
HEART RATE: 81 BPM | WEIGHT: 188 LBS | HEIGHT: 64 IN | TEMPERATURE: 99 F | BODY MASS INDEX: 32.1 KG/M2 | SYSTOLIC BLOOD PRESSURE: 133 MMHG | OXYGEN SATURATION: 98 % | RESPIRATION RATE: 17 BRPM | DIASTOLIC BLOOD PRESSURE: 63 MMHG

## 2020-12-02 DIAGNOSIS — M96.1 POSTLAMINECTOMY SYNDROME OF LUMBAR REGION: Primary | ICD-10-CM

## 2020-12-02 DIAGNOSIS — M51.36 DDD (DEGENERATIVE DISC DISEASE), LUMBAR: ICD-10-CM

## 2020-12-02 DIAGNOSIS — M54.9 DORSALGIA, UNSPECIFIED: ICD-10-CM

## 2020-12-02 DIAGNOSIS — F40.240 CLAUSTROPHOBIA: ICD-10-CM

## 2020-12-02 DIAGNOSIS — M70.62 GREATER TROCHANTERIC BURSITIS OF LEFT HIP: ICD-10-CM

## 2020-12-02 PROCEDURE — 99214 OFFICE O/P EST MOD 30 MIN: CPT | Mod: S$PBB,,, | Performed by: ANESTHESIOLOGY

## 2020-12-02 PROCEDURE — 99213 OFFICE O/P EST LOW 20 MIN: CPT | Mod: PBBFAC,PO | Performed by: ANESTHESIOLOGY

## 2020-12-02 PROCEDURE — 99214 PR OFFICE/OUTPT VISIT, EST, LEVL IV, 30-39 MIN: ICD-10-PCS | Mod: S$PBB,,, | Performed by: ANESTHESIOLOGY

## 2020-12-02 PROCEDURE — 99999 PR PBB SHADOW E&M-EST. PATIENT-LVL III: ICD-10-PCS | Mod: PBBFAC,,, | Performed by: ANESTHESIOLOGY

## 2020-12-02 PROCEDURE — 99999 PR PBB SHADOW E&M-EST. PATIENT-LVL III: CPT | Mod: PBBFAC,,, | Performed by: ANESTHESIOLOGY

## 2020-12-02 RX ORDER — LORAZEPAM 1 MG/1
1 TABLET ORAL
Qty: 1 TABLET | Refills: 0 | Status: SHIPPED | OUTPATIENT
Start: 2020-12-02 | End: 2021-02-18

## 2020-12-02 NOTE — PROGRESS NOTES
"FOLLOW UP NOTE:     CHIEF COMPLAINT: back pain    INITIAL HISTORY OF PRESENT ILLNESS: Mahsa Pedroza is a 86 y.o. female with PMH significant for anxiety on hydroxyzine, hx of bilateral hip replacements, and hx of lumbar laminectomy (approximately 10 years ago) presents as a referral for left hip pain. The patient reports that her pain began approximately 6 months ago when she was carrying a basket of laundry and she tripped and fell onto her hip. The patient localizes her pain to the lateral aspect of her left hip. The patient reports of radiation to her left groin and tailbone. The patient describes her pain as an aching type of pain. The patient reports of numbness in her bilateral lower extremities. The patient reports that her pain is a 3-4/10. Patient denies of any urinary/fecal incontinence, saddle anesthesia, or weakness.      She is requesting "stronger pain medication" to help her manage her pain. Of note, the patient has rather significant anxiety which made it rather difficult for her to focus throughout her consultation.      Aggravating factors: walking, has pain as rest as well     Mitigating factors: medicine, changing position    INTERVAL HISTORY OF PRESENT ILLNESS: Mahsa Pedroza is a 86 y.o. female with PMH significant for anxiety on hydroxyzine, hx of bilateral hip replacements, and hx of lumbar laminectomy (approximately 10 years ago) presents as an established patient for the continued management of hip and back pain. The patient is s/p left greater trochanteric bursa injection on 10/21/2020, and she reports of excellent relief in regards to her left hip pain. Today, the patient is complaining of pain across her lower back (L>R). The patient reports of radiation to her left hip. The patient reports of significant pain with walking. The patient denies of any significant changes in her health since her last appointment. The patient also denies of any changes in the character of her pain " "since her last appointment. Patient denies of any urinary/fecal incontinence, saddle anesthesia, or weakness.     INTERVENTIONAL PAIN HISTORY:  10/21/2020: left greater trochanteric bursa injection - 100% relief of left hip pain    CURRENT PAIN MEDICATIONS:   meloxicam 7.5 mg PO q day (some benefit)  gabapentin 200 mg PO QHS (reports of good benefit in regards to numbness)  OTC Tylenol PRN    ROS:  Review of Systems   Constitutional: Negative for chills and fever.   HENT: Negative for sore throat.    Eyes: Negative for visual disturbance.   Respiratory: Negative for shortness of breath.    Cardiovascular: Negative for chest pain.   Gastrointestinal: Negative for nausea and vomiting.   Genitourinary: Negative for difficulty urinating.   Musculoskeletal: Positive for arthralgias and back pain.   Skin: Negative for rash.   Allergic/Immunologic: Negative for immunocompromised state.   Neurological: Negative for syncope.   Hematological: Does not bruise/bleed easily.   Psychiatric/Behavioral: Negative for suicidal ideas.        MEDICAL, SURGICAL, FAMILY, SOCIAL HX: reviewed    MEDICATIONS/ALLERGIES: reviewed    PHYSICAL EXAM:    VITALS: Vitals reviewed.   Vitals:    12/02/20 1410   BP: 133/63   Pulse: 81   Resp: 17   Temp: 98.5 °F (36.9 °C)   SpO2: 98%   Weight: 85.3 kg (188 lb)   Height: 5' 4" (1.626 m)       Physical Exam   Constitutional: She is oriented to person, place, and time and well-developed, well-nourished, and in no distress.   HENT:   Head: Normocephalic and atraumatic.   Eyes: Conjunctivae and EOM are normal. Right eye exhibits no discharge. Left eye exhibits no discharge.   Cardiovascular: Normal rate.   Pulmonary/Chest: Effort normal and breath sounds normal. No respiratory distress.   Abdominal: Soft.   Neurological: She is alert and oriented to person, place, and time.   Skin: Skin is warm and dry. No rash noted. She is not diaphoretic.   Psychiatric: Mood, memory, affect and judgment normal.   Nursing " note and vitals reviewed.       UPPER EXTREMITIES: Normal alignment, normal range of motion, no atrophy, no skin changes,  hair growth and nail growth normal and equal bilaterally. No swelling, no tenderness.    LOWER EXTREMITIES:  Normal alignment, normal range of motion, no atrophy, no skin changes,  hair growth and nail growth normal and equal bilaterally. No swelling, no tenderness.     LUMBAR SPINE  Lumbar spine: ROM is limited with flexion extension and oblique extension with increased pain with passive ROM.     ((+)) Supine straight leg raise on the left   ((+)) Facet loading   Internal and external rotation of the hip causes increased pain on the left side. TTP at the site of the bilateral greater trochanter  Myofascial exam: Tenderness to palpation across lumbar paraspinous muscles. Prior midline scar is well-healed     ((+)) TTP at the SI joint on the left side  CHARLY's test: unable to tolerate secondary to pain  One leg stand: unable to perform secondary to fall risk    Distraction test: unable to tolerate secondary to pain     MOTOR: Tone and bulk: normal bilateral upper and lower Strength: normal   Delt      Bi         Tri        WE      WF                        R          5          5          5          5          5          5            L          5          5          5          5          5          5               IP         ADD     ABD     Quad   TA        Gas      HAM  R          5          5          5          5          5          5          5  L          5          5          5          5          5          5          5     SENSATION: Light touch and pinprick intact bilaterally  REFLEXES: normal, symmetric, nonbrisk.  Toes down, no clonus. Negative eagle's sign bilaterally.  GAIT: normal rise, base, steps, and arm swing.      IMAGING: no new imaging to review    ASSESSMENT: Mahsa Pedroza is a 86 y.o. female with PMH significant for anxiety on hydroxyzine, hx of bilateral hip  replacements, and hx of lumbar laminectomy (approximately 10 years ago) presents as an established patient for the continued management of hip and back pain. The patient is s/p left greater trochanteric bursa injection on 10/21/2020, and she reports of excellent relief in regards to her left hip pain. Today, the patient is complaining of pain across her lower back (L>R). The patient reports of radiation to her left hip. I believe that her lumbar spine and/or sacroiliac joints are contributing to her current pain. No dedicated imaging of her lumbar spine to review. Treatment plan outlined below.     PLAN:  1. Ordered MRI of the lumbar spine without contrast for further review. One tablet of Ativan provided for claustrophobia and anxiety.   2. I have stressed the importance of physical activity and a home exercise plan to help with chronic pain and improve health.  3. Can repeat left greater trochanteric bursa injection in the future PRN  4. Continue meloxicam 7.5 mg PO q day (some benefit), gabapentin 200 mg PO QHS (reports of good benefit in regards to numbness), OTC Tylenol PRN as prescribed by her PCP  5. RTC s/p imaging to discuss results and interventions as appropriate    Jim Ugalde MD  Pain Management

## 2020-12-07 ENCOUNTER — TELEPHONE (OUTPATIENT)
Dept: FAMILY MEDICINE | Facility: CLINIC | Age: 85
End: 2020-12-07

## 2020-12-07 NOTE — TELEPHONE ENCOUNTER
----- Message from Virginia Callahan sent at 12/7/2020  9:18 AM CST -----  Contact: Patient  Patient: Mahsa Pedroza  Phone: [phone]    Pt states she has procedure coming up and is requesting to have it scheduled for the morning. Please call back and advise. Thank you

## 2020-12-08 ENCOUNTER — HOSPITAL ENCOUNTER (OUTPATIENT)
Dept: RADIOLOGY | Facility: HOSPITAL | Age: 85
Discharge: HOME | End: 2020-12-08
Attending: ANESTHESIOLOGY
Payer: MEDICARE

## 2020-12-08 DIAGNOSIS — M96.1 POSTLAMINECTOMY SYNDROME OF LUMBAR REGION: ICD-10-CM

## 2020-12-08 DIAGNOSIS — M70.62 GREATER TROCHANTERIC BURSITIS OF LEFT HIP: ICD-10-CM

## 2020-12-08 DIAGNOSIS — M51.36 DDD (DEGENERATIVE DISC DISEASE), LUMBAR: ICD-10-CM

## 2020-12-08 PROCEDURE — 72148 MRI LUMBAR SPINE W/O DYE: CPT | Mod: 26,,, | Performed by: RADIOLOGY

## 2020-12-08 PROCEDURE — 72148 MRI LUMBAR SPINE W/O DYE: CPT | Mod: TC,PN

## 2020-12-08 PROCEDURE — 72148 MRI LUMBAR SPINE WITHOUT CONTRAST: ICD-10-PCS | Mod: 26,,, | Performed by: RADIOLOGY

## 2020-12-09 ENCOUNTER — TELEPHONE (OUTPATIENT)
Dept: PAIN MEDICINE | Facility: CLINIC | Age: 85
End: 2020-12-09

## 2020-12-09 ENCOUNTER — OFFICE VISIT (OUTPATIENT)
Dept: PAIN MEDICINE | Facility: CLINIC | Age: 85
End: 2020-12-09
Payer: MEDICARE

## 2020-12-09 VITALS
HEART RATE: 86 BPM | WEIGHT: 188 LBS | SYSTOLIC BLOOD PRESSURE: 138 MMHG | BODY MASS INDEX: 32.1 KG/M2 | DIASTOLIC BLOOD PRESSURE: 73 MMHG | RESPIRATION RATE: 19 BRPM | TEMPERATURE: 98 F | OXYGEN SATURATION: 98 % | HEIGHT: 64 IN

## 2020-12-09 DIAGNOSIS — M96.1 POSTLAMINECTOMY SYNDROME OF LUMBAR REGION: Primary | ICD-10-CM

## 2020-12-09 DIAGNOSIS — M54.16 LUMBAR RADICULOPATHY: ICD-10-CM

## 2020-12-09 DIAGNOSIS — M47.896 OTHER SPONDYLOSIS, LUMBAR REGION: ICD-10-CM

## 2020-12-09 DIAGNOSIS — M51.36 DDD (DEGENERATIVE DISC DISEASE), LUMBAR: ICD-10-CM

## 2020-12-09 PROCEDURE — 99214 OFFICE O/P EST MOD 30 MIN: CPT | Mod: S$PBB,,, | Performed by: ANESTHESIOLOGY

## 2020-12-09 PROCEDURE — 99999 PR PBB SHADOW E&M-EST. PATIENT-LVL III: ICD-10-PCS | Mod: PBBFAC,,, | Performed by: ANESTHESIOLOGY

## 2020-12-09 PROCEDURE — 99999 PR PBB SHADOW E&M-EST. PATIENT-LVL III: CPT | Mod: PBBFAC,,, | Performed by: ANESTHESIOLOGY

## 2020-12-09 PROCEDURE — 99213 OFFICE O/P EST LOW 20 MIN: CPT | Mod: PBBFAC,PO | Performed by: ANESTHESIOLOGY

## 2020-12-09 PROCEDURE — 99214 PR OFFICE/OUTPT VISIT, EST, LEVL IV, 30-39 MIN: ICD-10-PCS | Mod: S$PBB,,, | Performed by: ANESTHESIOLOGY

## 2020-12-09 NOTE — TELEPHONE ENCOUNTER
----- Message from Annamaria Newman sent at 12/9/2020  4:36 PM CST -----  Contact: pt  Patient called  she forgot to let you know she has a chip tail bone she has been having   Pain. She is concerned  about the Epidural on 12/23/20   The chips was from child birth when her first child was born 63 yrs ago.    call back [phone]

## 2020-12-09 NOTE — TELEPHONE ENCOUNTER
Informed pt that I have spoken with Dr. Ugalde and he states he will make a note of it but it will not effect her procedure. Pt verbalized understanding.

## 2020-12-09 NOTE — H&P (VIEW-ONLY)
"FOLLOW UP NOTE:     CHIEF COMPLAINT: back and hip pain    INITIAL HISTORY OF PRESENT ILLNESS: Mahsa Pedroza is a 86 y.o. female with PMH significant for anxiety on hydroxyzine, hx of bilateral hip replacements, and hx of lumbar laminectomy (approximately 10 years ago) presents as a referral for left hip pain. The patient reports that her pain began approximately 6 months ago when she was carrying a basket of laundry and she tripped and fell onto her hip. The patient localizes her pain to the lateral aspect of her left hip. The patient reports of radiation to her left groin and tailbone. The patient describes her pain as an aching type of pain. The patient reports of numbness in her bilateral lower extremities. The patient reports that her pain is a 3-4/10. Patient denies of any urinary/fecal incontinence, saddle anesthesia, or weakness.      She is requesting "stronger pain medication" to help her manage her pain. Of note, the patient has rather significant anxiety which made it rather difficult for her to focus throughout her consultation.      Aggravating factors: walking, has pain as rest as well     Mitigating factors: medicine, changing position    INTERVAL HISTORY OF PRESENT ILLNESS: Mahsa Pedroza is a 86 y.o. female with PMH significant for anxiety on hydroxyzine, hx of bilateral hip replacements, and hx of lumbar laminectomy (approximately 10 years ago) presents as an established patient for the continued management of hip and back pain. Today, the patient is complaining of pain across her lower back (L>R). The patient reports of radiation to her left hip. The patient reports of significant pain with walking. The patient denies of any significant changes in her health since her last appointment. The patient also denies of any changes in the character of her pain since her last appointment. Patient denies of any urinary/fecal incontinence, saddle anesthesia, or weakness.  The patient reports that " "she is currently pain free at this time. The patient presents today to discuss her most recent MRI results.      INTERVENTIONAL PAIN HISTORY:  10/21/2020: left greater trochanteric bursa injection - 100% relief of left hip pain    CURRENT PAIN MEDICATIONS:   meloxicam 7.5 mg PO q day (some benefit)  gabapentin 200 mg PO QHS (reports of good benefit in regards to numbness)  OTC Tylenol PRN    ROS:  Review of Systems   Constitutional: Negative for chills and fever.   HENT: Negative for sore throat.    Eyes: Negative for visual disturbance.   Respiratory: Negative for shortness of breath.    Cardiovascular: Negative for chest pain.   Gastrointestinal: Negative for nausea and vomiting.   Genitourinary: Negative for difficulty urinating.   Musculoskeletal: Positive for arthralgias and back pain.   Skin: Negative for rash.   Allergic/Immunologic: Negative for immunocompromised state.   Neurological: Negative for syncope.   Hematological: Does not bruise/bleed easily.   Psychiatric/Behavioral: Negative for suicidal ideas.        MEDICAL, SURGICAL, FAMILY, SOCIAL HX: reviewed    MEDICATIONS/ALLERGIES: reviewed    PHYSICAL EXAM:    VITALS: Vitals reviewed.   Vitals:    12/09/20 1318   BP: 138/73   Pulse: 86   Resp: 19   Temp: 98.2 °F (36.8 °C)   SpO2: 98%   Weight: 85.3 kg (188 lb)   Height: 5' 4" (1.626 m)   PainSc: 0-No pain       Physical Exam   Constitutional: She is oriented to person, place, and time and well-developed, well-nourished, and in no distress.   HENT:   Head: Normocephalic and atraumatic.   Eyes: Conjunctivae and EOM are normal. Right eye exhibits no discharge. Left eye exhibits no discharge.   Cardiovascular: Normal rate.   Pulmonary/Chest: Effort normal and breath sounds normal. No respiratory distress.   Abdominal: Soft.   Neurological: She is alert and oriented to person, place, and time.   Skin: Skin is warm and dry. No rash noted. She is not diaphoretic.   Psychiatric: Mood, memory, affect and " judgment normal.   Nursing note and vitals reviewed.     UPPER EXTREMITIES: Normal alignment, normal range of motion, no atrophy, no skin changes,  hair growth and nail growth normal and equal bilaterally. No swelling, no tenderness.    LOWER EXTREMITIES:  Normal alignment, normal range of motion, no atrophy, no skin changes,  hair growth and nail growth normal and equal bilaterally. No swelling, no tenderness.     LUMBAR SPINE  Lumbar spine: ROM is limited with flexion extension and oblique extension with increased pain with passive ROM.     ((+)) Supine straight leg raise on the left   ((+)) Facet loading   Internal and external rotation of the hip causes increased pain on the left side. TTP at the site of the bilateral greater trochanter  Myofascial exam: Tenderness to palpation across lumbar paraspinous muscles. Prior midline scar is well-healed     ((+)) TTP at the SI joint on the left side  CHARLY's test: unable to tolerate secondary to pain  One leg stand: unable to perform secondary to fall risk    Distraction test: unable to tolerate secondary to pain     MOTOR: Tone and bulk: normal bilateral upper and lower Strength: normal   Delt      Bi         Tri        WE      WF                        R          5          5          5          5          5          5            L          5          5          5          5          5          5               IP         ADD     ABD     Quad   TA        Gas      HAM  R          5          5          5          5          5          5          5  L          5          5          5          5          5          5          5     SENSATION: Light touch and pinprick intact bilaterally  REFLEXES: normal, symmetric, nonbrisk.  Toes down, no clonus. Negative eagle's sign bilaterally.  GAIT: normal rise, base, steps, and arm swing.      IMAGING: MRI Lumbar Spine without contrast (12/8/2020):  Alignment: 3 mm retrolisthesis L2 on L3 and 6 mm anterolisthesis L4 on L5.  levoscoliosis centered at L2.     Vertebrae: No acute fracture or marrow replacement.  Multiple Schmorl nodes.  Discogenic sub endplate degenerative changes about the L3-4 and L4-5 levels.  Central laminectomy at L4 and L5.     Discs: Multilevel disc desiccation.  Disc height loss severe at L2-3 and moderate at L4-5 and mild at the remaining included levels.     Cord: Normal.  Conus terminates at L1.     Paraspinal muscles & soft tissues: Unremarkable.     Degenerative findings:     T11-12: Mild posterior disc bulge seen only on sagittal.     T12-L1: There is a right paracentral disc extrusion with disc material extending 5 mm below and 3 mm above the disc level within the anterior epidural space.  Bilateral facet degenerative change.  Narrowed right lateral recess.     L1-L2: There is broad-based disc bulge and bilateral facet degenerative change.  Minimal spinal canal stenosis.     L2-L3: There is posterior disc osteophyte complex bilateral facet degenerative change and ligamentum flavum thickening which contribute to mild spinal canal stenosis and mild right neural foraminal narrowing.     L3-L4: There is mild broad-based disc bulge and bilateral facet degenerative changes.  Minimal spinal canal stenosis.  Mild bilateral neural foraminal narrowing.     L4-L5: Anterolisthesis uncovers the disc.  Broad-based disc bulge.  Bilateral facet degenerative change.  Mild spinal canal stenosis.  Severe left neural foraminal narrowing.     L5-S1: There is broad-based disc bulge with central disc fissure.  Left facet degenerative change.  Mild left neural foraminal narrowing.    ASSESSMENT: Mahsa Pedroza is a 86 y.o. female with PMH significant for anxiety on hydroxyzine, hx of bilateral hip replacements, and hx of lumbar laminectomy (approximately 10 years ago) presents as an established patient for the continued management of hip and back pain. Today, the patient is complaining of pain across her lower back (L>R). The  patient reports of radiation to her left hip. MRI significant for multi-level DDD, facet arthropathy, and severe left sided neural foraminal stenosis at the L4-L5 level. I believe all of the aforementioned pathologies are contributing to her current pain. Treatment plan outlined below.     PLAN:  1. Schedule for caudal epidural steroid injection  2. I have stressed the importance of physical activity and a home exercise plan to help with chronic pain and improve health.  3. Can repeat left greater trochanteric bursa injection in the future PRN  4. Continue meloxicam 7.5 mg PO q day (some benefit), gabapentin 200 mg PO QHS (reports of good benefit in regards to numbness), OTC Tylenol PRN as prescribed by her PCP  5. RTC for the procedure as outlined above     Jim Ugalde MD  Pain Management

## 2020-12-10 DIAGNOSIS — M51.36 DDD (DEGENERATIVE DISC DISEASE), LUMBAR: Primary | ICD-10-CM

## 2020-12-10 DIAGNOSIS — Z01.818 PRE-OP TESTING: ICD-10-CM

## 2020-12-20 ENCOUNTER — LAB VISIT (OUTPATIENT)
Dept: FAMILY MEDICINE | Facility: CLINIC | Age: 85
End: 2020-12-20
Payer: MEDICARE

## 2020-12-20 DIAGNOSIS — Z01.818 PRE-OP TESTING: ICD-10-CM

## 2020-12-20 LAB — SARS-COV-2 RNA RESP QL NAA+PROBE: NOT DETECTED

## 2020-12-20 PROCEDURE — U0003 INFECTIOUS AGENT DETECTION BY NUCLEIC ACID (DNA OR RNA); SEVERE ACUTE RESPIRATORY SYNDROME CORONAVIRUS 2 (SARS-COV-2) (CORONAVIRUS DISEASE [COVID-19]), AMPLIFIED PROBE TECHNIQUE, MAKING USE OF HIGH THROUGHPUT TECHNOLOGIES AS DESCRIBED BY CMS-2020-01-R: HCPCS

## 2020-12-23 ENCOUNTER — HOSPITAL ENCOUNTER (OUTPATIENT)
Facility: HOSPITAL | Age: 85
Discharge: HOME | End: 2020-12-23
Attending: ANESTHESIOLOGY | Admitting: ANESTHESIOLOGY
Payer: MEDICARE

## 2020-12-23 VITALS
SYSTOLIC BLOOD PRESSURE: 157 MMHG | WEIGHT: 183 LBS | OXYGEN SATURATION: 96 % | HEIGHT: 64 IN | RESPIRATION RATE: 14 BRPM | BODY MASS INDEX: 31.24 KG/M2 | DIASTOLIC BLOOD PRESSURE: 65 MMHG | HEART RATE: 80 BPM

## 2020-12-23 DIAGNOSIS — M51.36 DEGENERATIVE DISC DISEASE, LUMBAR: Primary | ICD-10-CM

## 2020-12-23 PROBLEM — M51.369 DEGENERATIVE DISC DISEASE, LUMBAR: Status: ACTIVE | Noted: 2020-12-23

## 2020-12-23 PROCEDURE — 25000003 PHARM REV CODE 250: Performed by: ANESTHESIOLOGY

## 2020-12-23 PROCEDURE — 63600175 PHARM REV CODE 636 W HCPCS: Performed by: ANESTHESIOLOGY

## 2020-12-23 PROCEDURE — 62323 NJX INTERLAMINAR LMBR/SAC: CPT | Mod: ,,, | Performed by: ANESTHESIOLOGY

## 2020-12-23 PROCEDURE — 62323 NJX INTERLAMINAR LMBR/SAC: CPT | Performed by: ANESTHESIOLOGY

## 2020-12-23 PROCEDURE — 25500020 PHARM REV CODE 255: Performed by: ANESTHESIOLOGY

## 2020-12-23 PROCEDURE — 62323 PR INJ LUMBAR/SACRAL, W/IMAGING GUIDANCE: ICD-10-PCS | Mod: ,,, | Performed by: ANESTHESIOLOGY

## 2020-12-23 RX ORDER — ALPRAZOLAM 0.25 MG/1
0.5 TABLET ORAL ONCE AS NEEDED
Status: DISCONTINUED | OUTPATIENT
Start: 2020-12-23 | End: 2020-12-23 | Stop reason: CLARIF

## 2020-12-23 RX ORDER — METHYLPREDNISOLONE ACETATE 80 MG/ML
INJECTION, SUSPENSION INTRA-ARTICULAR; INTRALESIONAL; INTRAMUSCULAR; SOFT TISSUE
Status: DISCONTINUED
Start: 2020-12-23 | End: 2020-12-23 | Stop reason: HOSPADM

## 2020-12-23 RX ORDER — LIDOCAINE HYDROCHLORIDE 10 MG/ML
INJECTION INFILTRATION; PERINEURAL
Status: DISCONTINUED | OUTPATIENT
Start: 2020-12-23 | End: 2020-12-23 | Stop reason: HOSPADM

## 2020-12-23 RX ORDER — LIDOCAINE HYDROCHLORIDE 10 MG/ML
INJECTION INFILTRATION; PERINEURAL
Status: DISCONTINUED
Start: 2020-12-23 | End: 2020-12-23 | Stop reason: HOSPADM

## 2020-12-23 RX ORDER — ALPRAZOLAM 0.25 MG/1
0.5 TABLET ORAL
Status: DISCONTINUED | OUTPATIENT
Start: 2020-12-23 | End: 2020-12-23 | Stop reason: CLARIF

## 2020-12-23 RX ORDER — ALPRAZOLAM 0.5 MG/1
1 TABLET, ORALLY DISINTEGRATING ORAL
Status: DISCONTINUED | OUTPATIENT
Start: 2020-12-23 | End: 2020-12-23 | Stop reason: HOSPADM

## 2020-12-23 RX ORDER — METHYLPREDNISOLONE ACETATE 80 MG/ML
INJECTION, SUSPENSION INTRA-ARTICULAR; INTRALESIONAL; INTRAMUSCULAR; SOFT TISSUE
Status: DISCONTINUED | OUTPATIENT
Start: 2020-12-23 | End: 2020-12-23 | Stop reason: HOSPADM

## 2020-12-23 RX ORDER — SODIUM CHLORIDE, SODIUM LACTATE, POTASSIUM CHLORIDE, CALCIUM CHLORIDE 600; 310; 30; 20 MG/100ML; MG/100ML; MG/100ML; MG/100ML
INJECTION, SOLUTION INTRAVENOUS ONCE AS NEEDED
Status: ACTIVE | OUTPATIENT
Start: 2020-12-23 | End: 2032-05-21

## 2020-12-23 NOTE — DISCHARGE SUMMARY
[facility]  Discharge Note  Short Stay    Procedure(s) (LRB):  RACHAEL Caudal (N/A)    OUTCOME: Patient tolerated treatment/procedure well without complication and is now ready for discharge.    DISPOSITION: Home or Self Care    FINAL DIAGNOSIS:  Degenerative disc disease, lumbar    FOLLOWUP: In clinic    DISCHARGE INSTRUCTIONS:    Discharge Procedure Orders   Diet general     Call MD for:  temperature >100.4     Call MD for:  persistent nausea and vomiting     Call MD for:  severe uncontrolled pain     Call MD for:  difficulty breathing, headache or visual disturbances     Call MD for:  redness, tenderness, or signs of infection (pain, swelling, redness, odor or green/yellow discharge around incision site)     Call MD for:  hives     Call MD for:  persistent dizziness or light-headedness     Call MD for:  extreme fatigue

## 2020-12-23 NOTE — INTERVAL H&P NOTE
The patient has been examined and the H&P has been reviewed:    I concur with the findings and no changes have occurred since H&P was written.    This patient has been cleared for surgery in an ambulatory surgical facility    ASA 3,  Mallampatti Score 3  No history of anesthetic complications  Plan for oral sedation    Anesthesia/Surgery risks, benefits and alternative options discussed and understood by patient/family.          There are no hospital problems to display for this patient.

## 2020-12-23 NOTE — OP NOTE
PROCEDURE DATE: 12/23/2020    PROCEDURE:  Caudal epidural steroid injection under fluoroscopy.    Diagnosis: Lumbar disc displacement without myelopathy  Post Op diagnosis: Same    PHYSICIAN: Jim Ugalde M.D.    MEDICATIONS INJECTED:   80 mg of depo-medrol, 5 mL of 1% lidocaine, and 4 ml of sterile, preservative-free NaCl.    LOCAL ANESTHETIC GIVEN:  Lidocaine 1%, 5 ml total    SEDATION MEDICATIONS: oral sedation    ESTIMATED BLOOD LOSS:  none    COMPLICATIONS:  none    TECHNIQUE:   After the patient was placed in prone position, the patient was prepped and draped in the usual sterile fashion using ChloraPrep and sterile towels.  Appropriate anatomic landmarks were determined by identifying the sacral hiatus in the lateral fluoroscopic view.  Local anesthetic was given via a 25g 1.5 inch needle by raising a wheal and infiltrating down to the periosteum.  A 3.5 inch 20 gauge touhy needle was introduced thru the sacral hiatus.  2 ml of contrast was injected to confirm placement in the appropriate area and that there was no vascular uptake.  The medication was then injected slowly.  The patient tolerated the procedure well.    The patient was monitored after the procedure.  Patient was given post procedure and discharge instructions to follow at home.  The patient was discharged in a stable condition

## 2020-12-24 ENCOUNTER — NURSE TRIAGE (OUTPATIENT)
Dept: ADMINISTRATIVE | Facility: CLINIC | Age: 85
End: 2020-12-24

## 2020-12-24 NOTE — TELEPHONE ENCOUNTER
No answer, left message x 2    Reason for Disposition   Message left on identified voice mail    Additional Information   Negative: Caller is angry or rude (e.g., hangs up, verbally abusive, yelling)   Negative: Caller hangs up   Negative: Caller has already spoken with the PCP and has no further questions.   Negative: Caller has already spoken with another triager and has no further questions.   Negative: Caller has already spoken with another triager or PCP AND has further questions AND triager able to answer questions.   Negative: Busy signal.  First attempt to contact caller.  Follow-up call scheduled within 15 minutes.   Negative: No answer.  First attempt to contact caller.  Follow-up call scheduled within 15 minutes.    Protocols used: NO CONTACT OR DUPLICATE CONTACT CALL-A-

## 2020-12-28 ENCOUNTER — NURSE TRIAGE (OUTPATIENT)
Dept: ADMINISTRATIVE | Facility: CLINIC | Age: 85
End: 2020-12-28

## 2020-12-28 NOTE — TELEPHONE ENCOUNTER
Pt states there's a thick brown dressing over the injection site, she has showered and it won't come off, she is asking if it's supposed to stay on, or come off?  No documentation in the AVS about the dressing.  Will message Dr. Ugalde for advice.  Reason for Disposition   Caller has NON-URGENT question and triager unable to answer question    Additional Information   Negative: Chest pain   Negative: Difficulty breathing   Negative: Acting confused (e.g., disoriented, slurred speech) or excessively sleepy   Negative: Surgical incision symptoms and questions   Negative: Discomfort (pain, burning or stinging) when passing urine and male   Negative: Discomfort (pain, burning or stinging) when passing urine and female   Negative: Constipation   Negative: New or worsening leg (calf, thigh) pain   Negative: New or worsening leg swelling   Negative: Dizziness is severe, or persists > 24 hours after surgery   Negative: Symptoms arising from use of a urinary catheter (Draper or Coude)   Negative: Cast problems or questions   Negative: Medication question   Negative: Sounds like a life-threatening emergency to the triager   Negative: Bright red, wide-spread, sunburn-like rash   Negative: SEVERE headache and after spinal (epidural) anesthesia   Negative: Vomiting and persists > 4 hours   Negative: Vomiting and abdomen looks much more swollen than usual   Negative: Drinking very little and dehydration suspected (e.g., no urine > 12 hours, very dry mouth, very lightheaded)   Negative: Patient sounds very sick or weak to the triager   Negative: Sounds like a serious complication to the triager   Negative: Fever > 100.4 F (38.0 C)   Negative: Caller has URGENT question and triager unable to answer question   Negative: SEVERE post-op pain (e.g., excruciating, pain scale 8-10) that is not controlled with pain medications   Negative: Headache and after spinal (epidural) anesthesia and not severe   Negative:  Fever present > 3 days (72 hours)   Negative: Patient wants to be seen   Negative: MILD TO MODERATE post-op pain (e.g., pain scale 1-7) that is not controlled with pain medications    Protocols used: POST-OP SYMPTOMS AND [id number]

## 2020-12-29 ENCOUNTER — TELEPHONE (OUTPATIENT)
Dept: PAIN MEDICINE | Facility: CLINIC | Age: 85
End: 2020-12-29

## 2020-12-29 NOTE — TELEPHONE ENCOUNTER
Pt informed ok to remove dressing. Pt stated she will have to call back for f/u apt , she has to find a ride

## 2021-01-07 ENCOUNTER — OFFICE VISIT (OUTPATIENT)
Dept: FAMILY MEDICINE | Facility: CLINIC | Age: 86
End: 2021-01-07
Payer: MEDICARE

## 2021-01-07 VITALS
BODY MASS INDEX: 31.69 KG/M2 | WEIGHT: 185.63 LBS | HEART RATE: 89 BPM | DIASTOLIC BLOOD PRESSURE: 60 MMHG | HEIGHT: 64 IN | TEMPERATURE: 98 F | OXYGEN SATURATION: 96 % | RESPIRATION RATE: 14 BRPM | SYSTOLIC BLOOD PRESSURE: 132 MMHG

## 2021-01-07 DIAGNOSIS — M25.552 PAIN OF LEFT HIP JOINT: ICD-10-CM

## 2021-01-07 DIAGNOSIS — F41.9 ANXIETY: Primary | ICD-10-CM

## 2021-01-07 PROCEDURE — 99213 PR OFFICE/OUTPT VISIT, EST, LEVL III, 20-29 MIN: ICD-10-PCS | Mod: S$GLB,,, | Performed by: FAMILY MEDICINE

## 2021-01-07 PROCEDURE — 99213 OFFICE O/P EST LOW 20 MIN: CPT | Mod: S$GLB,,, | Performed by: FAMILY MEDICINE

## 2021-02-10 ENCOUNTER — TELEPHONE (OUTPATIENT)
Dept: FAMILY MEDICINE | Facility: CLINIC | Age: 86
End: 2021-02-10

## 2021-02-18 ENCOUNTER — OFFICE VISIT (OUTPATIENT)
Dept: FAMILY MEDICINE | Facility: CLINIC | Age: 86
End: 2021-02-18
Payer: MEDICARE

## 2021-02-18 VITALS
TEMPERATURE: 98 F | RESPIRATION RATE: 14 BRPM | DIASTOLIC BLOOD PRESSURE: 59 MMHG | BODY MASS INDEX: 32.16 KG/M2 | SYSTOLIC BLOOD PRESSURE: 134 MMHG | OXYGEN SATURATION: 98 % | HEART RATE: 91 BPM | HEIGHT: 64 IN | WEIGHT: 188.38 LBS

## 2021-02-18 DIAGNOSIS — R79.9 ABNORMAL FINDING OF BLOOD CHEMISTRY, UNSPECIFIED: ICD-10-CM

## 2021-02-18 DIAGNOSIS — M25.552 PAIN OF LEFT HIP JOINT: ICD-10-CM

## 2021-02-18 DIAGNOSIS — Z13.6 ENCOUNTER FOR SCREENING FOR CARDIOVASCULAR DISORDERS: ICD-10-CM

## 2021-02-18 DIAGNOSIS — Z13.220 SCREENING FOR LIPID DISORDERS: ICD-10-CM

## 2021-02-18 DIAGNOSIS — M51.36 DEGENERATIVE DISC DISEASE, LUMBAR: Primary | ICD-10-CM

## 2021-02-18 DIAGNOSIS — E03.9 HYPOTHYROIDISM, UNSPECIFIED TYPE: ICD-10-CM

## 2021-02-18 DIAGNOSIS — Z79.899 LONG-TERM USE OF HIGH-RISK MEDICATION: ICD-10-CM

## 2021-02-18 PROCEDURE — 99214 OFFICE O/P EST MOD 30 MIN: CPT | Mod: S$GLB,,, | Performed by: FAMILY MEDICINE

## 2021-02-18 PROCEDURE — 99214 PR OFFICE/OUTPT VISIT, EST, LEVL IV, 30-39 MIN: ICD-10-PCS | Mod: S$GLB,,, | Performed by: FAMILY MEDICINE

## 2021-02-18 RX ORDER — HYDROCODONE BITARTRATE AND ACETAMINOPHEN 5; 325 MG/1; MG/1
1 TABLET ORAL
Qty: 30 TABLET | Refills: 0 | Status: SHIPPED | OUTPATIENT
Start: 2021-02-18 | End: 2021-03-22 | Stop reason: SDUPTHER

## 2021-02-22 ENCOUNTER — TELEPHONE (OUTPATIENT)
Dept: FAMILY MEDICINE | Facility: CLINIC | Age: 86
End: 2021-02-22

## 2021-02-23 LAB
CHOLEST SERPL-MSCNC: 230 MG/DL (ref 0–200)
HDLC SERPL-MCNC: 67 MG/DL
LDLC SERPL CALC-MCNC: 147 MG/DL
TRIGL SERPL-MCNC: 94 MG/DL

## 2021-02-25 ENCOUNTER — TELEPHONE (OUTPATIENT)
Dept: FAMILY MEDICINE | Facility: CLINIC | Age: 86
End: 2021-02-25

## 2021-03-07 ENCOUNTER — NURSE TRIAGE (OUTPATIENT)
Dept: ADMINISTRATIVE | Facility: CLINIC | Age: 86
End: 2021-03-07

## 2021-03-08 ENCOUNTER — TELEPHONE (OUTPATIENT)
Dept: PAIN MEDICINE | Facility: CLINIC | Age: 86
End: 2021-03-08

## 2021-03-11 ENCOUNTER — TELEPHONE (OUTPATIENT)
Dept: FAMILY MEDICINE | Facility: CLINIC | Age: 86
End: 2021-03-11

## 2021-03-11 DIAGNOSIS — M25.552 PAIN OF LEFT HIP JOINT: Primary | ICD-10-CM

## 2021-03-11 RX ORDER — DICLOFENAC SODIUM 10 MG/G
2 GEL TOPICAL 4 TIMES DAILY PRN
Qty: 100 G | Refills: 1 | Status: SHIPPED | OUTPATIENT
Start: 2021-03-11

## 2021-03-18 ENCOUNTER — OFFICE VISIT (OUTPATIENT)
Dept: FAMILY MEDICINE | Facility: CLINIC | Age: 86
End: 2021-03-18
Payer: MEDICARE

## 2021-03-18 VITALS
HEART RATE: 80 BPM | TEMPERATURE: 97 F | HEIGHT: 64 IN | RESPIRATION RATE: 14 BRPM | OXYGEN SATURATION: 96 % | DIASTOLIC BLOOD PRESSURE: 82 MMHG | SYSTOLIC BLOOD PRESSURE: 134 MMHG | BODY MASS INDEX: 32.34 KG/M2

## 2021-03-18 DIAGNOSIS — L08.9 SKIN INFECTION: ICD-10-CM

## 2021-03-18 DIAGNOSIS — M25.552 PAIN OF LEFT HIP JOINT: Primary | ICD-10-CM

## 2021-03-18 DIAGNOSIS — M51.36 DEGENERATIVE DISC DISEASE, LUMBAR: ICD-10-CM

## 2021-03-18 PROCEDURE — 99214 PR OFFICE/OUTPT VISIT, EST, LEVL IV, 30-39 MIN: ICD-10-PCS | Mod: S$GLB,,, | Performed by: FAMILY MEDICINE

## 2021-03-18 PROCEDURE — 99214 OFFICE O/P EST MOD 30 MIN: CPT | Mod: S$GLB,,, | Performed by: FAMILY MEDICINE

## 2021-03-18 RX ORDER — DOXYCYCLINE HYCLATE 100 MG
100 TABLET ORAL 2 TIMES DAILY
Qty: 14 TABLET | Refills: 0 | Status: SHIPPED | OUTPATIENT
Start: 2021-03-18 | End: 2021-03-25

## 2021-03-22 RX ORDER — HYDROCODONE BITARTRATE AND ACETAMINOPHEN 5; 325 MG/1; MG/1
1 TABLET ORAL
Qty: 30 TABLET | Refills: 0 | Status: SHIPPED | OUTPATIENT
Start: 2021-03-30 | End: 2021-05-15 | Stop reason: SDUPTHER

## 2021-03-25 ENCOUNTER — PATIENT OUTREACH (OUTPATIENT)
Dept: ADMINISTRATIVE | Facility: HOSPITAL | Age: 86
End: 2021-03-25

## 2021-03-26 ENCOUNTER — LAB VISIT (OUTPATIENT)
Dept: LAB | Facility: HOSPITAL | Age: 86
End: 2021-03-26
Attending: FAMILY MEDICINE
Payer: MEDICARE

## 2021-03-26 DIAGNOSIS — E03.9 HYPOTHYROIDISM, UNSPECIFIED TYPE: ICD-10-CM

## 2021-03-26 DIAGNOSIS — Z79.899 LONG-TERM USE OF HIGH-RISK MEDICATION: ICD-10-CM

## 2021-03-26 DIAGNOSIS — Z13.6 ENCOUNTER FOR SCREENING FOR CARDIOVASCULAR DISORDERS: ICD-10-CM

## 2021-03-26 DIAGNOSIS — Z13.220 SCREENING FOR LIPID DISORDERS: ICD-10-CM

## 2021-03-26 DIAGNOSIS — R79.9 ABNORMAL FINDING OF BLOOD CHEMISTRY, UNSPECIFIED: ICD-10-CM

## 2021-03-26 LAB
ANION GAP SERPL CALC-SCNC: 8 MMOL/L (ref 8–16)
BASOPHILS # BLD AUTO: 0.04 K/UL (ref 0–0.2)
BASOPHILS NFR BLD: 0.6 % (ref 0–1.9)
BUN SERPL-MCNC: 13 MG/DL (ref 8–23)
CALCIUM SERPL-MCNC: 9.4 MG/DL (ref 8.7–10.5)
CHLORIDE SERPL-SCNC: 101 MMOL/L (ref 95–110)
CHOLEST SERPL-MCNC: 262 MG/DL (ref 120–199)
CHOLEST/HDLC SERPL: 3.5 {RATIO} (ref 2–5)
CO2 SERPL-SCNC: 28 MMOL/L (ref 23–29)
CREAT SERPL-MCNC: 0.7 MG/DL (ref 0.5–1.4)
DIFFERENTIAL METHOD: NORMAL
EOSINOPHIL # BLD AUTO: 0.2 K/UL (ref 0–0.5)
EOSINOPHIL NFR BLD: 3.3 % (ref 0–8)
ERYTHROCYTE [DISTWIDTH] IN BLOOD BY AUTOMATED COUNT: 13.4 % (ref 11.5–14.5)
EST. GFR  (AFRICAN AMERICAN): >60 ML/MIN/1.73 M^2
EST. GFR  (NON AFRICAN AMERICAN): >60 ML/MIN/1.73 M^2
ESTIMATED AVG GLUCOSE: 117 MG/DL (ref 68–131)
GLUCOSE SERPL-MCNC: 101 MG/DL (ref 70–110)
HBA1C MFR BLD: 5.7 % (ref 4.5–6.2)
HCT VFR BLD AUTO: 41 % (ref 37–48.5)
HDLC SERPL-MCNC: 75 MG/DL (ref 40–75)
HDLC SERPL: 28.6 % (ref 20–50)
HGB BLD-MCNC: 13.3 G/DL (ref 12–16)
IMM GRANULOCYTES # BLD AUTO: 0.03 K/UL (ref 0–0.04)
IMM GRANULOCYTES NFR BLD AUTO: 0.5 % (ref 0–0.5)
LDLC SERPL CALC-MCNC: 169.6 MG/DL (ref 63–159)
LYMPHOCYTES # BLD AUTO: 2 K/UL (ref 1–4.8)
LYMPHOCYTES NFR BLD: 30.9 % (ref 18–48)
MCH RBC QN AUTO: 29.1 PG (ref 27–31)
MCHC RBC AUTO-ENTMCNC: 32.4 G/DL (ref 32–36)
MCV RBC AUTO: 90 FL (ref 82–98)
MONOCYTES # BLD AUTO: 0.7 K/UL (ref 0.3–1)
MONOCYTES NFR BLD: 11 % (ref 4–15)
NEUTROPHILS # BLD AUTO: 3.4 K/UL (ref 1.8–7.7)
NEUTROPHILS NFR BLD: 53.7 % (ref 38–73)
NONHDLC SERPL-MCNC: 187 MG/DL
NRBC BLD-RTO: 0 /100 WBC
PLATELET # BLD AUTO: 268 K/UL (ref 150–350)
PMV BLD AUTO: 9.7 FL (ref 9.2–12.9)
POTASSIUM SERPL-SCNC: 4.4 MMOL/L (ref 3.5–5.1)
RBC # BLD AUTO: 4.57 M/UL (ref 4–5.4)
SODIUM SERPL-SCNC: 137 MMOL/L (ref 136–145)
TRIGL SERPL-MCNC: 87 MG/DL (ref 30–150)
TSH SERPL DL<=0.005 MIU/L-ACNC: 2.71 UIU/ML (ref 0.34–5.6)
WBC # BLD AUTO: 6.38 K/UL (ref 3.9–12.7)

## 2021-03-26 PROCEDURE — 83036 HEMOGLOBIN GLYCOSYLATED A1C: CPT | Performed by: FAMILY MEDICINE

## 2021-03-26 PROCEDURE — 85025 COMPLETE CBC W/AUTO DIFF WBC: CPT | Performed by: FAMILY MEDICINE

## 2021-03-26 PROCEDURE — 84443 ASSAY THYROID STIM HORMONE: CPT | Performed by: FAMILY MEDICINE

## 2021-03-26 PROCEDURE — 36415 COLL VENOUS BLD VENIPUNCTURE: CPT | Performed by: FAMILY MEDICINE

## 2021-03-26 PROCEDURE — 80061 LIPID PANEL: CPT | Performed by: FAMILY MEDICINE

## 2021-03-26 PROCEDURE — 80048 BASIC METABOLIC PNL TOTAL CA: CPT | Performed by: FAMILY MEDICINE

## 2021-03-29 ENCOUNTER — TELEPHONE (OUTPATIENT)
Dept: FAMILY MEDICINE | Facility: CLINIC | Age: 86
End: 2021-03-29

## 2021-04-20 ENCOUNTER — OFFICE VISIT (OUTPATIENT)
Dept: FAMILY MEDICINE | Facility: CLINIC | Age: 86
End: 2021-04-20
Payer: MEDICARE

## 2021-04-20 VITALS
HEIGHT: 64 IN | DIASTOLIC BLOOD PRESSURE: 70 MMHG | RESPIRATION RATE: 14 BRPM | WEIGHT: 185.63 LBS | OXYGEN SATURATION: 98 % | BODY MASS INDEX: 31.69 KG/M2 | HEART RATE: 78 BPM | TEMPERATURE: 97 F | SYSTOLIC BLOOD PRESSURE: 132 MMHG

## 2021-04-20 DIAGNOSIS — M51.36 DEGENERATIVE DISC DISEASE, LUMBAR: ICD-10-CM

## 2021-04-20 DIAGNOSIS — M25.552 PAIN OF LEFT HIP JOINT: Primary | ICD-10-CM

## 2021-04-20 PROCEDURE — 99213 PR OFFICE/OUTPT VISIT, EST, LEVL III, 20-29 MIN: ICD-10-PCS | Mod: S$GLB,,, | Performed by: FAMILY MEDICINE

## 2021-04-20 PROCEDURE — 99213 OFFICE O/P EST LOW 20 MIN: CPT | Mod: S$GLB,,, | Performed by: FAMILY MEDICINE

## 2021-04-20 RX ORDER — CHLORHEXIDINE GLUCONATE ORAL RINSE 1.2 MG/ML
SOLUTION DENTAL
COMMUNITY
Start: 2021-03-17 | End: 2022-09-16

## 2021-05-02 ENCOUNTER — NURSE TRIAGE (OUTPATIENT)
Dept: ADMINISTRATIVE | Facility: CLINIC | Age: 86
End: 2021-05-02

## 2021-05-15 DIAGNOSIS — M25.552 PAIN OF LEFT HIP JOINT: ICD-10-CM

## 2021-05-15 DIAGNOSIS — M51.36 DEGENERATIVE DISC DISEASE, LUMBAR: ICD-10-CM

## 2021-05-15 RX ORDER — HYDROCODONE BITARTRATE AND ACETAMINOPHEN 5; 325 MG/1; MG/1
1 TABLET ORAL
Qty: 30 TABLET | Refills: 0 | Status: SHIPPED | OUTPATIENT
Start: 2021-05-18 | End: 2021-06-21 | Stop reason: SDUPTHER

## 2021-06-21 DIAGNOSIS — M51.36 DEGENERATIVE DISC DISEASE, LUMBAR: ICD-10-CM

## 2021-06-21 DIAGNOSIS — M25.552 PAIN OF LEFT HIP JOINT: ICD-10-CM

## 2021-06-21 RX ORDER — HYDROCODONE BITARTRATE AND ACETAMINOPHEN 5; 325 MG/1; MG/1
1 TABLET ORAL
Qty: 30 TABLET | Refills: 0 | Status: SHIPPED | OUTPATIENT
Start: 2021-06-21 | End: 2021-07-21 | Stop reason: SDUPTHER

## 2021-06-26 ENCOUNTER — NURSE TRIAGE (OUTPATIENT)
Dept: ADMINISTRATIVE | Facility: CLINIC | Age: 86
End: 2021-06-26

## 2021-07-08 ENCOUNTER — OFFICE VISIT (OUTPATIENT)
Dept: FAMILY MEDICINE | Facility: CLINIC | Age: 86
End: 2021-07-08
Payer: MEDICARE

## 2021-07-08 VITALS
HEART RATE: 78 BPM | DIASTOLIC BLOOD PRESSURE: 64 MMHG | RESPIRATION RATE: 14 BRPM | BODY MASS INDEX: 32.61 KG/M2 | OXYGEN SATURATION: 97 % | HEIGHT: 64 IN | TEMPERATURE: 98 F | SYSTOLIC BLOOD PRESSURE: 132 MMHG | WEIGHT: 191 LBS

## 2021-07-08 DIAGNOSIS — I10 ESSENTIAL HYPERTENSION: Primary | ICD-10-CM

## 2021-07-08 PROCEDURE — 99214 OFFICE O/P EST MOD 30 MIN: CPT | Mod: S$GLB,,, | Performed by: FAMILY MEDICINE

## 2021-07-08 PROCEDURE — 99214 PR OFFICE/OUTPT VISIT, EST, LEVL IV, 30-39 MIN: ICD-10-PCS | Mod: S$GLB,,, | Performed by: FAMILY MEDICINE

## 2021-07-08 RX ORDER — LISINOPRIL 10 MG/1
10 TABLET ORAL DAILY
Qty: 90 TABLET | Refills: 3 | Status: SHIPPED | OUTPATIENT
Start: 2021-07-08 | End: 2021-09-27

## 2021-07-21 ENCOUNTER — TELEPHONE (OUTPATIENT)
Dept: FAMILY MEDICINE | Facility: CLINIC | Age: 86
End: 2021-07-21

## 2021-07-21 DIAGNOSIS — M25.552 PAIN OF LEFT HIP JOINT: ICD-10-CM

## 2021-07-21 DIAGNOSIS — M51.36 DEGENERATIVE DISC DISEASE, LUMBAR: ICD-10-CM

## 2021-07-21 RX ORDER — HYDROCODONE BITARTRATE AND ACETAMINOPHEN 5; 325 MG/1; MG/1
1 TABLET ORAL
Qty: 30 TABLET | Refills: 0 | Status: SHIPPED | OUTPATIENT
Start: 2021-07-21 | End: 2021-08-26 | Stop reason: SDUPTHER

## 2021-08-26 DIAGNOSIS — M25.552 PAIN OF LEFT HIP JOINT: ICD-10-CM

## 2021-08-26 DIAGNOSIS — M51.36 DEGENERATIVE DISC DISEASE, LUMBAR: ICD-10-CM

## 2021-08-26 RX ORDER — HYDROCODONE BITARTRATE AND ACETAMINOPHEN 5; 325 MG/1; MG/1
1 TABLET ORAL
Qty: 30 TABLET | Refills: 0 | Status: SHIPPED | OUTPATIENT
Start: 2021-08-26 | End: 2021-09-27

## 2021-09-27 ENCOUNTER — OFFICE VISIT (OUTPATIENT)
Dept: FAMILY MEDICINE | Facility: CLINIC | Age: 86
End: 2021-09-27
Payer: MEDICARE

## 2021-09-27 VITALS
HEART RATE: 82 BPM | HEIGHT: 64 IN | TEMPERATURE: 98 F | SYSTOLIC BLOOD PRESSURE: 124 MMHG | BODY MASS INDEX: 32.1 KG/M2 | WEIGHT: 188 LBS | RESPIRATION RATE: 14 BRPM | DIASTOLIC BLOOD PRESSURE: 62 MMHG | OXYGEN SATURATION: 97 %

## 2021-09-27 DIAGNOSIS — M51.36 DEGENERATIVE DISC DISEASE, LUMBAR: ICD-10-CM

## 2021-09-27 DIAGNOSIS — I10 ESSENTIAL HYPERTENSION: ICD-10-CM

## 2021-09-27 DIAGNOSIS — M25.552 PAIN OF LEFT HIP JOINT: Primary | ICD-10-CM

## 2021-09-27 PROCEDURE — 99999 PR PBB SHADOW E&M-EST. PATIENT-LVL III: CPT | Mod: PBBFAC,,, | Performed by: FAMILY MEDICINE

## 2021-09-27 PROCEDURE — 80307 DRUG TEST PRSMV CHEM ANLYZR: CPT | Performed by: FAMILY MEDICINE

## 2021-09-27 PROCEDURE — 99214 PR OFFICE/OUTPT VISIT, EST, LEVL IV, 30-39 MIN: ICD-10-PCS | Mod: S$PBB,,, | Performed by: FAMILY MEDICINE

## 2021-09-27 PROCEDURE — 99214 OFFICE O/P EST MOD 30 MIN: CPT | Mod: S$PBB,,, | Performed by: FAMILY MEDICINE

## 2021-09-27 PROCEDURE — 99213 OFFICE O/P EST LOW 20 MIN: CPT | Mod: PBBFAC | Performed by: FAMILY MEDICINE

## 2021-09-27 PROCEDURE — 99999 PR PBB SHADOW E&M-EST. PATIENT-LVL III: ICD-10-PCS | Mod: PBBFAC,,, | Performed by: FAMILY MEDICINE

## 2021-09-27 RX ORDER — TRAMADOL HYDROCHLORIDE 50 MG/1
50 TABLET ORAL EVERY 12 HOURS PRN
Qty: 30 TABLET | Refills: 0 | Status: SHIPPED | OUTPATIENT
Start: 2021-09-27 | End: 2021-10-25 | Stop reason: SDUPTHER

## 2021-09-27 RX ORDER — MINERAL OIL
180 ENEMA (ML) RECTAL DAILY
COMMUNITY
End: 2021-10-25

## 2021-09-27 RX ORDER — LOSARTAN POTASSIUM 25 MG/1
25 TABLET ORAL DAILY
Qty: 90 TABLET | Refills: 3 | Status: SHIPPED | OUTPATIENT
Start: 2021-09-27 | End: 2021-12-14

## 2021-10-02 LAB
6MAM UR QL: NOT DETECTED
7AMINOCLONAZEPAM UR QL: NOT DETECTED
A-OH ALPRAZ UR QL: NOT DETECTED
ALPHA-OH-MIDAZOLAM: NOT DETECTED
ALPRAZ UR QL: NOT DETECTED
AMPHET UR QL SCN: NOT DETECTED
ANNOTATION COMMENT IMP: NORMAL
ANNOTATION COMMENT IMP: NORMAL
BARBITURATES UR QL: NOT DETECTED
BUPRENORPHINE UR QL: NOT DETECTED
BZE UR QL: NOT DETECTED
CARBOXYTHC UR QL: NOT DETECTED
CARISOPRODOL UR QL: NOT DETECTED
CLONAZEPAM UR QL: NOT DETECTED
CODEINE UR QL: NOT DETECTED
CREAT UR-MCNC: 72.5 MG/DL (ref 20–400)
DIAZEPAM UR QL: NOT DETECTED
ETHYL GLUCURONIDE UR QL: NOT DETECTED
FENTANYL UR QL: NOT DETECTED
GABAPENTIN: PRESENT
HYDROCODONE UR QL: NOT DETECTED
HYDROMORPHONE UR QL: NOT DETECTED
LORAZEPAM UR QL: NOT DETECTED
MDA UR QL: NOT DETECTED
MDEA UR QL: NOT DETECTED
MDMA UR QL: NOT DETECTED
ME-PHENIDATE UR QL: NOT DETECTED
METHADONE UR QL: NOT DETECTED
METHAMPHET UR QL: NOT DETECTED
MIDAZOLAM UR QL SCN: NOT DETECTED
MORPHINE UR QL: NOT DETECTED
NALOXONE: NOT DETECTED
NORBUPRENORPHINE UR QL CFM: NOT DETECTED
NORDIAZEPAM UR QL: NOT DETECTED
NORFENTANYL UR QL: NOT DETECTED
NORHYDROCODONE UR QL CFM: NOT DETECTED
NORMEPERIDINE UR QL CFM: NOT DETECTED
NOROXYCODONE UR QL CFM: NOT DETECTED
NOROXYMORPHONE UR QL SCN: NOT DETECTED
OXAZEPAM UR QL: NOT DETECTED
OXYCODONE UR QL: NOT DETECTED
OXYMORPHONE UR QL: NOT DETECTED
PATHOLOGY STUDY: NORMAL
PCP UR QL: NOT DETECTED
PHENTERMINE UR QL: NOT DETECTED
PREGABALIN: NOT DETECTED
SERVICE CMNT-IMP: NORMAL
TAPENTADOL UR QL SCN: NOT DETECTED
TAPENTADOL UR QL SCN: NOT DETECTED
TEMAZEPAM UR QL: NOT DETECTED
TRAMADOL UR QL: NOT DETECTED
ZOLPIDEM METABOLITE: NOT DETECTED
ZOLPIDEM UR QL: NOT DETECTED

## 2021-10-05 RX ORDER — GABAPENTIN 100 MG/1
100 CAPSULE ORAL 3 TIMES DAILY
Qty: 90 CAPSULE | Refills: 11 | Status: CANCELLED | OUTPATIENT
Start: 2021-10-05 | End: 2022-10-05

## 2021-10-11 ENCOUNTER — CLINICAL SUPPORT (OUTPATIENT)
Dept: FAMILY MEDICINE | Facility: CLINIC | Age: 86
End: 2021-10-11
Payer: MEDICARE

## 2021-10-11 DIAGNOSIS — Z23 NEED FOR PROPHYLACTIC VACCINATION AND INOCULATION AGAINST INFLUENZA: Primary | ICD-10-CM

## 2021-10-11 PROCEDURE — 90662 IIV NO PRSV INCREASED AG IM: CPT | Mod: PBBFAC

## 2021-10-11 PROCEDURE — G0008 ADMIN INFLUENZA VIRUS VAC: HCPCS | Mod: PBBFAC

## 2021-10-12 ENCOUNTER — PATIENT OUTREACH (OUTPATIENT)
Dept: ADMINISTRATIVE | Facility: OTHER | Age: 86
End: 2021-10-12

## 2021-10-14 ENCOUNTER — OFFICE VISIT (OUTPATIENT)
Dept: UROGYNECOLOGY | Facility: CLINIC | Age: 86
End: 2021-10-14
Payer: MEDICARE

## 2021-10-14 VITALS
SYSTOLIC BLOOD PRESSURE: 138 MMHG | WEIGHT: 188.06 LBS | BODY MASS INDEX: 32.11 KG/M2 | HEART RATE: 77 BPM | DIASTOLIC BLOOD PRESSURE: 64 MMHG | HEIGHT: 64 IN

## 2021-10-14 DIAGNOSIS — N81.3 CYSTOCELE AND RECTOCELE WITH COMPLETE UTEROVAGINAL PROLAPSE: ICD-10-CM

## 2021-10-14 DIAGNOSIS — Z01.818 PRE-OP TESTING: Primary | ICD-10-CM

## 2021-10-14 PROCEDURE — 99214 OFFICE O/P EST MOD 30 MIN: CPT | Mod: PBBFAC,PO | Performed by: OBSTETRICS & GYNECOLOGY

## 2021-10-14 PROCEDURE — 99214 PR OFFICE/OUTPT VISIT, EST, LEVL IV, 30-39 MIN: ICD-10-PCS | Mod: S$PBB,,, | Performed by: OBSTETRICS & GYNECOLOGY

## 2021-10-14 PROCEDURE — 99214 OFFICE O/P EST MOD 30 MIN: CPT | Mod: S$PBB,,, | Performed by: OBSTETRICS & GYNECOLOGY

## 2021-10-14 PROCEDURE — 99999 PR PBB SHADOW E&M-EST. PATIENT-LVL IV: ICD-10-PCS | Mod: PBBFAC,,, | Performed by: OBSTETRICS & GYNECOLOGY

## 2021-10-14 PROCEDURE — 99999 PR PBB SHADOW E&M-EST. PATIENT-LVL IV: CPT | Mod: PBBFAC,,, | Performed by: OBSTETRICS & GYNECOLOGY

## 2021-10-15 ENCOUNTER — TELEPHONE (OUTPATIENT)
Dept: FAMILY MEDICINE | Facility: CLINIC | Age: 86
End: 2021-10-15

## 2021-10-25 ENCOUNTER — HOSPITAL ENCOUNTER (OUTPATIENT)
Dept: CARDIOLOGY | Facility: HOSPITAL | Age: 86
Discharge: HOME | End: 2021-10-25
Attending: FAMILY MEDICINE
Payer: MEDICARE

## 2021-10-25 ENCOUNTER — HOSPITAL ENCOUNTER (OUTPATIENT)
Dept: RADIOLOGY | Facility: HOSPITAL | Age: 86
Discharge: HOME | End: 2021-10-25
Attending: FAMILY MEDICINE
Payer: MEDICARE

## 2021-10-25 ENCOUNTER — OFFICE VISIT (OUTPATIENT)
Dept: FAMILY MEDICINE | Facility: CLINIC | Age: 86
End: 2021-10-25
Payer: MEDICARE

## 2021-10-25 VITALS
HEIGHT: 64 IN | DIASTOLIC BLOOD PRESSURE: 68 MMHG | SYSTOLIC BLOOD PRESSURE: 130 MMHG | BODY MASS INDEX: 32.89 KG/M2 | WEIGHT: 192.63 LBS | OXYGEN SATURATION: 97 % | HEART RATE: 73 BPM | RESPIRATION RATE: 14 BRPM | TEMPERATURE: 98 F

## 2021-10-25 DIAGNOSIS — M51.36 DEGENERATIVE DISC DISEASE, LUMBAR: ICD-10-CM

## 2021-10-25 DIAGNOSIS — Z01.818 PREOPERATIVE CLEARANCE: ICD-10-CM

## 2021-10-25 DIAGNOSIS — M25.552 PAIN OF LEFT HIP JOINT: ICD-10-CM

## 2021-10-25 DIAGNOSIS — Z01.818 PREOPERATIVE CLEARANCE: Primary | ICD-10-CM

## 2021-10-25 PROCEDURE — 99999 PR PBB SHADOW E&M-EST. PATIENT-LVL IV: ICD-10-PCS | Mod: PBBFAC,,, | Performed by: FAMILY MEDICINE

## 2021-10-25 PROCEDURE — 71046 XR CHEST PA AND LATERAL: ICD-10-PCS | Mod: 26,,, | Performed by: RADIOLOGY

## 2021-10-25 PROCEDURE — 71046 X-RAY EXAM CHEST 2 VIEWS: CPT | Mod: TC,FY

## 2021-10-25 PROCEDURE — 93005 ELECTROCARDIOGRAM TRACING: CPT

## 2021-10-25 PROCEDURE — 99999 PR PBB SHADOW E&M-EST. PATIENT-LVL IV: CPT | Mod: PBBFAC,,, | Performed by: FAMILY MEDICINE

## 2021-10-25 PROCEDURE — 99214 PR OFFICE/OUTPT VISIT, EST, LEVL IV, 30-39 MIN: ICD-10-PCS | Mod: ,,, | Performed by: FAMILY MEDICINE

## 2021-10-25 PROCEDURE — 99214 OFFICE O/P EST MOD 30 MIN: CPT | Mod: PBBFAC,25 | Performed by: FAMILY MEDICINE

## 2021-10-25 PROCEDURE — 99214 OFFICE O/P EST MOD 30 MIN: CPT | Mod: ,,, | Performed by: FAMILY MEDICINE

## 2021-10-25 PROCEDURE — 71046 X-RAY EXAM CHEST 2 VIEWS: CPT | Mod: 26,,, | Performed by: RADIOLOGY

## 2021-10-25 RX ORDER — TRAMADOL HYDROCHLORIDE 50 MG/1
50 TABLET ORAL EVERY 12 HOURS PRN
Qty: 60 TABLET | Refills: 0 | Status: SHIPPED | OUTPATIENT
Start: 2021-10-25 | End: 2022-01-03 | Stop reason: SDUPTHER

## 2021-11-02 ENCOUNTER — TELEPHONE (OUTPATIENT)
Dept: UROGYNECOLOGY | Facility: CLINIC | Age: 86
End: 2021-11-02

## 2021-11-02 NOTE — TELEPHONE ENCOUNTER
----- Message from Cristobal Gonzalez sent at 11/2/2021  9:53 AM CDT -----  Type:  Patient Returning Call  Who Called:  Patient  Who Left Message for Patient:  Chrissie   Does the patient know what this is regarding?:  possible surgery  Best Call Back Number:  [phone]  Additional Information:  Pt missed call from Chrissie, pt requesting a call back.Pt states she confirm tomorrow's appt.

## 2021-11-02 NOTE — TELEPHONE ENCOUNTER
----- Message from Ly Stock sent at 11/2/2021  9:10 AM CDT -----  Contact: self  Sabino has surgery holli on 11/9 and has questions, please call back at [phone] (home).  Thanks

## 2021-11-03 ENCOUNTER — HOSPITAL ENCOUNTER (OUTPATIENT)
Dept: PREADMISSION TESTING | Facility: HOSPITAL | Age: 86
Discharge: HOME | End: 2021-11-03
Attending: OBSTETRICS & GYNECOLOGY
Payer: MEDICARE

## 2021-11-03 ENCOUNTER — OFFICE VISIT (OUTPATIENT)
Dept: UROGYNECOLOGY | Facility: CLINIC | Age: 86
End: 2021-11-03
Payer: MEDICARE

## 2021-11-03 VITALS
SYSTOLIC BLOOD PRESSURE: 136 MMHG | BODY MASS INDEX: 31.58 KG/M2 | DIASTOLIC BLOOD PRESSURE: 60 MMHG | WEIGHT: 185 LBS | HEART RATE: 60 BPM | HEIGHT: 64 IN

## 2021-11-03 DIAGNOSIS — Z01.818 PREOP TESTING: Primary | ICD-10-CM

## 2021-11-03 DIAGNOSIS — N81.2 CYSTOCELE AND RECTOCELE WITH INCOMPLETE UTEROVAGINAL PROLAPSE: Primary | ICD-10-CM

## 2021-11-03 LAB
ABO + RH BLD: NORMAL
BLD GP AB SCN CELLS X3 SERPL QL: NORMAL

## 2021-11-03 PROCEDURE — 99213 OFFICE O/P EST LOW 20 MIN: CPT | Mod: PBBFAC,PO | Performed by: OBSTETRICS & GYNECOLOGY

## 2021-11-03 PROCEDURE — 99900103 DSU ONLY-NO CHARGE-INITIAL HR (STAT)

## 2021-11-03 PROCEDURE — 99999 PR PBB SHADOW E&M-EST. PATIENT-LVL III: CPT | Mod: PBBFAC,,, | Performed by: OBSTETRICS & GYNECOLOGY

## 2021-11-03 PROCEDURE — 36415 COLL VENOUS BLD VENIPUNCTURE: CPT | Performed by: OBSTETRICS & GYNECOLOGY

## 2021-11-03 PROCEDURE — 99213 OFFICE O/P EST LOW 20 MIN: CPT | Mod: S$PBB,,, | Performed by: OBSTETRICS & GYNECOLOGY

## 2021-11-03 PROCEDURE — 99900104 DSU ONLY-NO CHARGE-EA ADD'L HR (STAT)

## 2021-11-03 PROCEDURE — 99213 PR OFFICE/OUTPT VISIT, EST, LEVL III, 20-29 MIN: ICD-10-PCS | Mod: S$PBB,,, | Performed by: OBSTETRICS & GYNECOLOGY

## 2021-11-03 PROCEDURE — 99999 PR PBB SHADOW E&M-EST. PATIENT-LVL III: ICD-10-PCS | Mod: PBBFAC,,, | Performed by: OBSTETRICS & GYNECOLOGY

## 2021-11-03 PROCEDURE — 86900 BLOOD TYPING SEROLOGIC ABO: CPT | Performed by: OBSTETRICS & GYNECOLOGY

## 2021-11-05 DIAGNOSIS — N81.2 INCOMPLETE UTEROVAGINAL PROLAPSE: ICD-10-CM

## 2021-11-05 DIAGNOSIS — N81.2 CYSTOCELE AND RECTOCELE WITH INCOMPLETE UTEROVAGINAL PROLAPSE: Primary | ICD-10-CM

## 2021-11-08 ENCOUNTER — ANESTHESIA EVENT (OUTPATIENT)
Dept: SURGERY | Facility: HOSPITAL | Age: 86
End: 2021-11-08
Payer: MEDICARE

## 2021-11-09 ENCOUNTER — HOSPITAL ENCOUNTER (OUTPATIENT)
Facility: HOSPITAL | Age: 86
Discharge: HOME | End: 2021-11-10
Attending: OBSTETRICS & GYNECOLOGY | Admitting: OBSTETRICS & GYNECOLOGY
Payer: MEDICARE

## 2021-11-09 ENCOUNTER — ANESTHESIA (OUTPATIENT)
Dept: SURGERY | Facility: HOSPITAL | Age: 86
End: 2021-11-09
Payer: MEDICARE

## 2021-11-09 DIAGNOSIS — N81.2 CYSTOCELE AND RECTOCELE WITH INCOMPLETE UTEROVAGINAL PROLAPSE: ICD-10-CM

## 2021-11-09 DIAGNOSIS — N81.2 INCOMPLETE UTEROVAGINAL PROLAPSE: ICD-10-CM

## 2021-11-09 PROCEDURE — 99900103 DSU ONLY-NO CHARGE-INITIAL HR (STAT): Performed by: OBSTETRICS & GYNECOLOGY

## 2021-11-09 PROCEDURE — 25000003 PHARM REV CODE 250: Performed by: OBSTETRICS & GYNECOLOGY

## 2021-11-09 PROCEDURE — D9220A PRA ANESTHESIA: Mod: ANES,,, | Performed by: ANESTHESIOLOGY

## 2021-11-09 PROCEDURE — 63600175 PHARM REV CODE 636 W HCPCS: Performed by: OBSTETRICS & GYNECOLOGY

## 2021-11-09 PROCEDURE — 63600175 PHARM REV CODE 636 W HCPCS: Performed by: NURSE ANESTHETIST, CERTIFIED REGISTERED

## 2021-11-09 PROCEDURE — 36000713 HC OR TIME LEV V EA ADD 15 MIN: Performed by: OBSTETRICS & GYNECOLOGY

## 2021-11-09 PROCEDURE — 94799 UNLISTED PULMONARY SVC/PX: CPT

## 2021-11-09 PROCEDURE — 25000003 PHARM REV CODE 250: Performed by: ANESTHESIOLOGY

## 2021-11-09 PROCEDURE — 71000033 HC RECOVERY, INTIAL HOUR: Performed by: OBSTETRICS & GYNECOLOGY

## 2021-11-09 PROCEDURE — D9220A PRA ANESTHESIA: ICD-10-PCS | Mod: CRNA,,, | Performed by: NURSE ANESTHETIST, CERTIFIED REGISTERED

## 2021-11-09 PROCEDURE — 88305 TISSUE EXAM BY PATHOLOGIST: CPT | Mod: 26,,, | Performed by: STUDENT IN AN ORGANIZED HEALTH CARE EDUCATION/TRAINING PROGRAM

## 2021-11-09 PROCEDURE — 25000003 PHARM REV CODE 250: Performed by: NURSE ANESTHETIST, CERTIFIED REGISTERED

## 2021-11-09 PROCEDURE — D9220A PRA ANESTHESIA: Mod: CRNA,,, | Performed by: NURSE ANESTHETIST, CERTIFIED REGISTERED

## 2021-11-09 PROCEDURE — 63600175 PHARM REV CODE 636 W HCPCS: Performed by: ANESTHESIOLOGY

## 2021-11-09 PROCEDURE — 57425 PR LAPAROSCOPY, SURG, COLPOPEXY: ICD-10-PCS | Mod: ,,, | Performed by: OBSTETRICS & GYNECOLOGY

## 2021-11-09 PROCEDURE — 58542 LSH W/T/O UT 250 G OR LESS: CPT | Mod: 51,,, | Performed by: OBSTETRICS & GYNECOLOGY

## 2021-11-09 PROCEDURE — 99900104 DSU ONLY-NO CHARGE-EA ADD'L HR (STAT): Performed by: OBSTETRICS & GYNECOLOGY

## 2021-11-09 PROCEDURE — 27201423 OPTIME MED/SURG SUP & DEVICES STERILE SUPPLY: Performed by: OBSTETRICS & GYNECOLOGY

## 2021-11-09 PROCEDURE — 94760 N-INVAS EAR/PLS OXIMETRY 1: CPT

## 2021-11-09 PROCEDURE — C1781 MESH (IMPLANTABLE): HCPCS | Performed by: OBSTETRICS & GYNECOLOGY

## 2021-11-09 PROCEDURE — D9220A PRA ANESTHESIA: ICD-10-PCS | Mod: ANES,,, | Performed by: ANESTHESIOLOGY

## 2021-11-09 PROCEDURE — 58542 PR LAP, SUPRACERVIAL HYSTERECTOMY W/ TUBE&OV, <250G: ICD-10-PCS | Mod: 51,,, | Performed by: OBSTETRICS & GYNECOLOGY

## 2021-11-09 PROCEDURE — 71000039 HC RECOVERY, EACH ADD'L HOUR: Performed by: OBSTETRICS & GYNECOLOGY

## 2021-11-09 PROCEDURE — 25000003 PHARM REV CODE 250: Performed by: INTERNAL MEDICINE

## 2021-11-09 PROCEDURE — 88305 TISSUE EXAM BY PATHOLOGIST: ICD-10-PCS | Mod: 26,,, | Performed by: STUDENT IN AN ORGANIZED HEALTH CARE EDUCATION/TRAINING PROGRAM

## 2021-11-09 PROCEDURE — 88305 TISSUE EXAM BY PATHOLOGIST: CPT | Performed by: STUDENT IN AN ORGANIZED HEALTH CARE EDUCATION/TRAINING PROGRAM

## 2021-11-09 PROCEDURE — 57425 LAPAROSCOPY SURG COLPOPEXY: CPT | Mod: ,,, | Performed by: OBSTETRICS & GYNECOLOGY

## 2021-11-09 PROCEDURE — 37000009 HC ANESTHESIA EA ADD 15 MINS: Performed by: OBSTETRICS & GYNECOLOGY

## 2021-11-09 PROCEDURE — 37000008 HC ANESTHESIA 1ST 15 MINUTES: Performed by: OBSTETRICS & GYNECOLOGY

## 2021-11-09 PROCEDURE — 36000712 HC OR TIME LEV V 1ST 15 MIN: Performed by: OBSTETRICS & GYNECOLOGY

## 2021-11-09 DEVICE — MESH RESTORELLE Y 24X4CM: Type: IMPLANTABLE DEVICE | Site: PELVIS | Status: FUNCTIONAL

## 2021-11-09 RX ORDER — ROCURONIUM BROMIDE 10 MG/ML
INJECTION, SOLUTION INTRAVENOUS
Status: DISCONTINUED | OUTPATIENT
Start: 2021-11-09 | End: 2021-11-09

## 2021-11-09 RX ORDER — HYDROMORPHONE HYDROCHLORIDE 2 MG/ML
0.2 INJECTION, SOLUTION INTRAMUSCULAR; INTRAVENOUS; SUBCUTANEOUS EVERY 5 MIN PRN
Status: DISCONTINUED | OUTPATIENT
Start: 2021-11-09 | End: 2021-11-09 | Stop reason: HOSPADM

## 2021-11-09 RX ORDER — CEFAZOLIN SODIUM 2 G/50ML
2 SOLUTION INTRAVENOUS
Status: COMPLETED | OUTPATIENT
Start: 2021-11-09 | End: 2021-11-09

## 2021-11-09 RX ORDER — ENOXAPARIN SODIUM 100 MG/ML
40 INJECTION SUBCUTANEOUS EVERY 24 HOURS
Status: DISCONTINUED | OUTPATIENT
Start: 2021-11-09 | End: 2021-11-10 | Stop reason: HOSPADM

## 2021-11-09 RX ORDER — BISACODYL 10 MG
10 SUPPOSITORY, RECTAL RECTAL DAILY PRN
Status: DISCONTINUED | OUTPATIENT
Start: 2021-11-09 | End: 2021-11-10 | Stop reason: HOSPADM

## 2021-11-09 RX ORDER — ONDANSETRON HYDROCHLORIDE 2 MG/ML
INJECTION, SOLUTION INTRAMUSCULAR; INTRAVENOUS
Status: DISCONTINUED | OUTPATIENT
Start: 2021-11-09 | End: 2021-11-09

## 2021-11-09 RX ORDER — DIPHENHYDRAMINE HCL 25 MG
25 CAPSULE ORAL EVERY 4 HOURS PRN
Status: DISCONTINUED | OUTPATIENT
Start: 2021-11-09 | End: 2021-11-10 | Stop reason: HOSPADM

## 2021-11-09 RX ORDER — OXYCODONE AND ACETAMINOPHEN 5; 325 MG/1; MG/1
1 TABLET ORAL EVERY 4 HOURS PRN
Status: DISCONTINUED | OUTPATIENT
Start: 2021-11-09 | End: 2021-11-10 | Stop reason: HOSPADM

## 2021-11-09 RX ORDER — SODIUM CHLORIDE, SODIUM LACTATE, POTASSIUM CHLORIDE, CALCIUM CHLORIDE 600; 310; 30; 20 MG/100ML; MG/100ML; MG/100ML; MG/100ML
INJECTION, SOLUTION INTRAVENOUS CONTINUOUS
Status: DISCONTINUED | OUTPATIENT
Start: 2021-11-09 | End: 2021-11-10 | Stop reason: HOSPADM

## 2021-11-09 RX ORDER — DIPHENHYDRAMINE HYDROCHLORIDE 50 MG/ML
25 INJECTION INTRAMUSCULAR; INTRAVENOUS EVERY 4 HOURS PRN
Status: DISCONTINUED | OUTPATIENT
Start: 2021-11-09 | End: 2021-11-10 | Stop reason: HOSPADM

## 2021-11-09 RX ORDER — FENTANYL CITRATE 50 UG/ML
25 INJECTION, SOLUTION INTRAMUSCULAR; INTRAVENOUS EVERY 5 MIN PRN
Status: COMPLETED | OUTPATIENT
Start: 2021-11-09 | End: 2021-11-09

## 2021-11-09 RX ORDER — LIDOCAINE HCL/PF 100 MG/5ML
SYRINGE (ML) INTRAVENOUS
Status: DISCONTINUED | OUTPATIENT
Start: 2021-11-09 | End: 2021-11-09

## 2021-11-09 RX ORDER — DEXAMETHASONE SODIUM PHOSPHATE 4 MG/ML
INJECTION, SOLUTION INTRA-ARTICULAR; INTRALESIONAL; INTRAMUSCULAR; INTRAVENOUS; SOFT TISSUE
Status: DISCONTINUED | OUTPATIENT
Start: 2021-11-09 | End: 2021-11-09

## 2021-11-09 RX ORDER — IBUPROFEN 600 MG/1
600 TABLET ORAL EVERY 6 HOURS
Status: DISCONTINUED | OUTPATIENT
Start: 2021-11-10 | End: 2021-11-10 | Stop reason: HOSPADM

## 2021-11-09 RX ORDER — LIDOCAINE HYDROCHLORIDE 20 MG/ML
JELLY TOPICAL
Status: DISCONTINUED | OUTPATIENT
Start: 2021-11-09 | End: 2021-11-09 | Stop reason: HOSPADM

## 2021-11-09 RX ORDER — FENTANYL CITRATE 50 UG/ML
INJECTION, SOLUTION INTRAMUSCULAR; INTRAVENOUS
Status: DISCONTINUED | OUTPATIENT
Start: 2021-11-09 | End: 2021-11-09

## 2021-11-09 RX ORDER — ONDANSETRON 8 MG/1
8 TABLET, ORALLY DISINTEGRATING ORAL EVERY 8 HOURS PRN
Status: DISCONTINUED | OUTPATIENT
Start: 2021-11-09 | End: 2021-11-10 | Stop reason: HOSPADM

## 2021-11-09 RX ORDER — POLYETHYLENE GLYCOL 3350 17 G/17G
17 POWDER, FOR SOLUTION ORAL DAILY
Status: DISCONTINUED | OUTPATIENT
Start: 2021-11-10 | End: 2021-11-09 | Stop reason: SDUPTHER

## 2021-11-09 RX ORDER — MUPIROCIN 20 MG/G
1 OINTMENT TOPICAL 2 TIMES DAILY
Status: DISCONTINUED | OUTPATIENT
Start: 2021-11-09 | End: 2021-11-10 | Stop reason: HOSPADM

## 2021-11-09 RX ORDER — LIDOCAINE HYDROCHLORIDE 10 MG/ML
1 INJECTION, SOLUTION EPIDURAL; INFILTRATION; INTRACAUDAL; PERINEURAL ONCE
Status: DISCONTINUED | OUTPATIENT
Start: 2021-11-09 | End: 2021-11-09 | Stop reason: HOSPADM

## 2021-11-09 RX ORDER — PROCHLORPERAZINE EDISYLATE 5 MG/ML
5 INJECTION INTRAMUSCULAR; INTRAVENOUS EVERY 6 HOURS PRN
Status: DISCONTINUED | OUTPATIENT
Start: 2021-11-09 | End: 2021-11-10 | Stop reason: HOSPADM

## 2021-11-09 RX ORDER — KETOROLAC TROMETHAMINE 30 MG/ML
15 INJECTION, SOLUTION INTRAMUSCULAR; INTRAVENOUS EVERY 6 HOURS
Status: COMPLETED | OUTPATIENT
Start: 2021-11-09 | End: 2021-11-10

## 2021-11-09 RX ORDER — ONDANSETRON 2 MG/ML
4 INJECTION INTRAMUSCULAR; INTRAVENOUS ONCE AS NEEDED
Status: DISCONTINUED | OUTPATIENT
Start: 2021-11-09 | End: 2021-11-09 | Stop reason: HOSPADM

## 2021-11-09 RX ORDER — FAMOTIDINE 10 MG/ML
20 INJECTION INTRAVENOUS EVERY 12 HOURS
Status: DISCONTINUED | OUTPATIENT
Start: 2021-11-09 | End: 2021-11-10 | Stop reason: HOSPADM

## 2021-11-09 RX ORDER — LOSARTAN POTASSIUM 25 MG/1
25 TABLET ORAL DAILY
Status: DISCONTINUED | OUTPATIENT
Start: 2021-11-10 | End: 2021-11-10 | Stop reason: HOSPADM

## 2021-11-09 RX ORDER — POLYETHYLENE GLYCOL 3350 17 G/17G
17 POWDER, FOR SOLUTION ORAL DAILY
Status: DISCONTINUED | OUTPATIENT
Start: 2021-11-10 | End: 2021-11-10 | Stop reason: HOSPADM

## 2021-11-09 RX ORDER — LABETALOL HYDROCHLORIDE 5 MG/ML
10 INJECTION, SOLUTION INTRAVENOUS ONCE
Status: COMPLETED | OUTPATIENT
Start: 2021-11-09 | End: 2021-11-09

## 2021-11-09 RX ORDER — GABAPENTIN 100 MG/1
100 CAPSULE ORAL 3 TIMES DAILY
Status: DISCONTINUED | OUTPATIENT
Start: 2021-11-09 | End: 2021-11-10 | Stop reason: HOSPADM

## 2021-11-09 RX ORDER — ACETAMINOPHEN 10 MG/ML
INJECTION, SOLUTION INTRAVENOUS
Status: DISCONTINUED | OUTPATIENT
Start: 2021-11-09 | End: 2021-11-09

## 2021-11-09 RX ORDER — LEVOTHYROXINE SODIUM 50 UG/1
50 TABLET ORAL
Status: DISCONTINUED | OUTPATIENT
Start: 2021-11-10 | End: 2021-11-10 | Stop reason: HOSPADM

## 2021-11-09 RX ORDER — PROPOFOL 10 MG/ML
VIAL (ML) INTRAVENOUS
Status: DISCONTINUED | OUTPATIENT
Start: 2021-11-09 | End: 2021-11-09

## 2021-11-09 RX ORDER — HYDROMORPHONE HYDROCHLORIDE 1 MG/ML
1 INJECTION, SOLUTION INTRAMUSCULAR; INTRAVENOUS; SUBCUTANEOUS EVERY 4 HOURS PRN
Status: DISCONTINUED | OUTPATIENT
Start: 2021-11-09 | End: 2021-11-10 | Stop reason: HOSPADM

## 2021-11-09 RX ORDER — OXYCODONE AND ACETAMINOPHEN 10; 325 MG/1; MG/1
1 TABLET ORAL EVERY 4 HOURS PRN
Status: DISCONTINUED | OUTPATIENT
Start: 2021-11-09 | End: 2021-11-10 | Stop reason: HOSPADM

## 2021-11-09 RX ADMIN — GLYCOPYRROLATE 0.2 MG: 0.2 INJECTION, SOLUTION INTRAMUSCULAR; INTRAVITREAL at 11:11

## 2021-11-09 RX ADMIN — KETOROLAC TROMETHAMINE 15 MG: 30 INJECTION, SOLUTION INTRAMUSCULAR at 06:11

## 2021-11-09 RX ADMIN — CONJUGATED ESTROGENS 0.5 G: 0.62 CREAM VAGINAL at 12:11

## 2021-11-09 RX ADMIN — OXYCODONE AND ACETAMINOPHEN 1 TABLET: 10; 325 TABLET ORAL at 04:11

## 2021-11-09 RX ADMIN — ROCURONIUM BROMIDE 50 MG: 10 INJECTION, SOLUTION INTRAVENOUS at 10:11

## 2021-11-09 RX ADMIN — SODIUM CHLORIDE, SODIUM GLUCONATE, SODIUM ACETATE, POTASSIUM CHLORIDE, MAGNESIUM CHLORIDE, SODIUM PHOSPHATE, DIBASIC, AND POTASSIUM PHOSPHATE: .53; .5; .37; .037; .03; .012; .00082 INJECTION, SOLUTION INTRAVENOUS at 12:11

## 2021-11-09 RX ADMIN — HYDROMORPHONE HYDROCHLORIDE 0.2 MG: 2 INJECTION, SOLUTION INTRAMUSCULAR; INTRAVENOUS; SUBCUTANEOUS at 03:11

## 2021-11-09 RX ADMIN — FENTANYL CITRATE 25 MCG: 50 INJECTION INTRAMUSCULAR; INTRAVENOUS at 02:11

## 2021-11-09 RX ADMIN — SODIUM CHLORIDE, SODIUM LACTATE, POTASSIUM CHLORIDE, AND CALCIUM CHLORIDE: .6; .31; .03; .02 INJECTION, SOLUTION INTRAVENOUS at 03:11

## 2021-11-09 RX ADMIN — ACETAMINOPHEN 1000 MG: 10 INJECTION, SOLUTION INTRAVENOUS at 11:11

## 2021-11-09 RX ADMIN — LABETALOL HYDROCHLORIDE 10 MG: 5 INJECTION INTRAVENOUS at 02:11

## 2021-11-09 RX ADMIN — ONDANSETRON 4 MG: 2 INJECTION, SOLUTION INTRAMUSCULAR; INTRAVENOUS at 11:11

## 2021-11-09 RX ADMIN — OXYCODONE AND ACETAMINOPHEN 1 TABLET: 10; 325 TABLET ORAL at 09:11

## 2021-11-09 RX ADMIN — ENOXAPARIN SODIUM 40 MG: 40 INJECTION, SOLUTION INTRAVENOUS; SUBCUTANEOUS at 04:11

## 2021-11-09 RX ADMIN — PROPOFOL 100 MG: 10 INJECTION, EMULSION INTRAVENOUS at 10:11

## 2021-11-09 RX ADMIN — DIPHENHYDRAMINE HYDROCHLORIDE 25 MG: 25 CAPSULE ORAL at 11:11

## 2021-11-09 RX ADMIN — MUPIROCIN 1 G: 20 OINTMENT TOPICAL at 09:11

## 2021-11-09 RX ADMIN — PROPOFOL 20 MG: 10 INJECTION, EMULSION INTRAVENOUS at 10:11

## 2021-11-09 RX ADMIN — GABAPENTIN 100 MG: 100 CAPSULE ORAL at 09:11

## 2021-11-09 RX ADMIN — LIDOCAINE HYDROCHLORIDE 50 MG: 20 INJECTION INTRAVENOUS at 10:11

## 2021-11-09 RX ADMIN — FENTANYL CITRATE 50 MCG: 50 INJECTION, SOLUTION INTRAMUSCULAR; INTRAVENOUS at 11:11

## 2021-11-09 RX ADMIN — FAMOTIDINE 20 MG: 10 INJECTION INTRAVENOUS at 09:11

## 2021-11-09 RX ADMIN — FENTANYL CITRATE 50 MCG: 50 INJECTION, SOLUTION INTRAMUSCULAR; INTRAVENOUS at 10:11

## 2021-11-09 RX ADMIN — SODIUM CHLORIDE, SODIUM LACTATE, POTASSIUM CHLORIDE, AND CALCIUM CHLORIDE: .6; .31; .03; .02 INJECTION, SOLUTION INTRAVENOUS at 11:11

## 2021-11-09 RX ADMIN — SODIUM CHLORIDE, SODIUM GLUCONATE, SODIUM ACETATE, POTASSIUM CHLORIDE, MAGNESIUM CHLORIDE, SODIUM PHOSPHATE, DIBASIC, AND POTASSIUM PHOSPHATE: .53; .5; .37; .037; .03; .012; .00082 INJECTION, SOLUTION INTRAVENOUS at 09:11

## 2021-11-09 RX ADMIN — DEXAMETHASONE SODIUM PHOSPHATE 4 MG: 4 INJECTION, SOLUTION INTRA-ARTICULAR; INTRALESIONAL; INTRAMUSCULAR; INTRAVENOUS; SOFT TISSUE at 11:11

## 2021-11-09 RX ADMIN — SUGAMMADEX 200 MG: 100 INJECTION, SOLUTION INTRAVENOUS at 01:11

## 2021-11-09 RX ADMIN — CEFAZOLIN SODIUM 2 G: 2 SOLUTION INTRAVENOUS at 11:11

## 2021-11-09 RX ADMIN — ONDANSETRON 4 MG: 2 INJECTION, SOLUTION INTRAMUSCULAR; INTRAVENOUS at 01:11

## 2021-11-09 RX ADMIN — KETOROLAC TROMETHAMINE 15 MG: 30 INJECTION, SOLUTION INTRAMUSCULAR at 11:11

## 2021-11-10 ENCOUNTER — NURSE TRIAGE (OUTPATIENT)
Dept: ADMINISTRATIVE | Facility: CLINIC | Age: 86
End: 2021-11-10
Payer: MEDICARE

## 2021-11-10 VITALS
DIASTOLIC BLOOD PRESSURE: 64 MMHG | BODY MASS INDEX: 31.81 KG/M2 | OXYGEN SATURATION: 95 % | HEART RATE: 84 BPM | SYSTOLIC BLOOD PRESSURE: 146 MMHG | TEMPERATURE: 99 F | RESPIRATION RATE: 14 BRPM | HEIGHT: 64 IN | WEIGHT: 186.31 LBS

## 2021-11-10 PROBLEM — N81.4 UTERINE PROLAPSE: Status: ACTIVE | Noted: 2021-11-10

## 2021-11-10 LAB
BASOPHILS # BLD AUTO: 0.02 K/UL (ref 0–0.2)
BASOPHILS NFR BLD: 0.2 % (ref 0–1.9)
DIFFERENTIAL METHOD: ABNORMAL
EOSINOPHIL # BLD AUTO: 0 K/UL (ref 0–0.5)
EOSINOPHIL NFR BLD: 0.1 % (ref 0–8)
ERYTHROCYTE [DISTWIDTH] IN BLOOD BY AUTOMATED COUNT: 13.5 % (ref 11.5–14.5)
HCT VFR BLD AUTO: 31.6 % (ref 37–48.5)
HGB BLD-MCNC: 10.4 G/DL (ref 12–16)
IMM GRANULOCYTES # BLD AUTO: 0.03 K/UL (ref 0–0.04)
IMM GRANULOCYTES NFR BLD AUTO: 0.3 % (ref 0–0.5)
LYMPHOCYTES # BLD AUTO: 1.7 K/UL (ref 1–4.8)
LYMPHOCYTES NFR BLD: 18.5 % (ref 18–48)
MCH RBC QN AUTO: 29.5 PG (ref 27–31)
MCHC RBC AUTO-ENTMCNC: 32.9 G/DL (ref 32–36)
MCV RBC AUTO: 90 FL (ref 82–98)
MONOCYTES # BLD AUTO: 0.8 K/UL (ref 0.3–1)
MONOCYTES NFR BLD: 8.9 % (ref 4–15)
NEUTROPHILS # BLD AUTO: 6.7 K/UL (ref 1.8–7.7)
NEUTROPHILS NFR BLD: 72 % (ref 38–73)
NRBC BLD-RTO: 0 /100 WBC
PLATELET # BLD AUTO: 178 K/UL (ref 150–450)
PMV BLD AUTO: 9.6 FL (ref 9.2–12.9)
RBC # BLD AUTO: 3.52 M/UL (ref 4–5.4)
WBC # BLD AUTO: 9.25 K/UL (ref 3.9–12.7)

## 2021-11-10 PROCEDURE — 94799 UNLISTED PULMONARY SVC/PX: CPT

## 2021-11-10 PROCEDURE — 85025 COMPLETE CBC W/AUTO DIFF WBC: CPT | Performed by: OBSTETRICS & GYNECOLOGY

## 2021-11-10 PROCEDURE — 25000003 PHARM REV CODE 250: Performed by: OBSTETRICS & GYNECOLOGY

## 2021-11-10 PROCEDURE — 25000003 PHARM REV CODE 250: Performed by: INTERNAL MEDICINE

## 2021-11-10 PROCEDURE — 36415 COLL VENOUS BLD VENIPUNCTURE: CPT | Performed by: OBSTETRICS & GYNECOLOGY

## 2021-11-10 PROCEDURE — 63600175 PHARM REV CODE 636 W HCPCS: Performed by: OBSTETRICS & GYNECOLOGY

## 2021-11-10 RX ORDER — OXYCODONE AND ACETAMINOPHEN 7.5; 325 MG/1; MG/1
1 TABLET ORAL EVERY 8 HOURS PRN
Qty: 21 TABLET | Refills: 0 | Status: SHIPPED | OUTPATIENT
Start: 2021-11-10 | End: 2021-11-17

## 2021-11-10 RX ADMIN — POLYETHYLENE GLYCOL 3350 17 G: 17 POWDER, FOR SOLUTION ORAL at 09:11

## 2021-11-10 RX ADMIN — FAMOTIDINE 20 MG: 10 INJECTION INTRAVENOUS at 09:11

## 2021-11-10 RX ADMIN — KETOROLAC TROMETHAMINE 15 MG: 30 INJECTION, SOLUTION INTRAMUSCULAR at 05:11

## 2021-11-10 RX ADMIN — LEVOTHYROXINE SODIUM 50 MCG: 50 TABLET ORAL at 05:11

## 2021-11-10 RX ADMIN — LOSARTAN POTASSIUM 25 MG: 25 TABLET, FILM COATED ORAL at 09:11

## 2021-11-10 RX ADMIN — MUPIROCIN 1 G: 20 OINTMENT TOPICAL at 09:11

## 2021-11-10 RX ADMIN — GABAPENTIN 100 MG: 100 CAPSULE ORAL at 09:11

## 2021-11-10 RX ADMIN — KETOROLAC TROMETHAMINE 15 MG: 30 INJECTION, SOLUTION INTRAMUSCULAR at 11:11

## 2021-11-12 ENCOUNTER — TELEPHONE (OUTPATIENT)
Dept: UROGYNECOLOGY | Facility: CLINIC | Age: 86
End: 2021-11-12
Payer: MEDICARE

## 2021-11-12 RX ORDER — ONDANSETRON 4 MG/1
4 TABLET, ORALLY DISINTEGRATING ORAL EVERY 8 HOURS PRN
Qty: 10 TABLET | Refills: 1 | Status: SHIPPED | OUTPATIENT
Start: 2021-11-12 | End: 2022-04-11

## 2021-11-18 LAB
FINAL PATHOLOGIC DIAGNOSIS: NORMAL
GROSS: NORMAL
Lab: NORMAL

## 2021-11-23 ENCOUNTER — OFFICE VISIT (OUTPATIENT)
Dept: UROGYNECOLOGY | Facility: CLINIC | Age: 86
End: 2021-11-23
Payer: MEDICARE

## 2021-11-23 VITALS
HEIGHT: 64 IN | HEART RATE: 85 BPM | WEIGHT: 186.31 LBS | SYSTOLIC BLOOD PRESSURE: 137 MMHG | BODY MASS INDEX: 31.81 KG/M2 | DIASTOLIC BLOOD PRESSURE: 70 MMHG

## 2021-11-23 DIAGNOSIS — R35.0 URINARY FREQUENCY: Primary | ICD-10-CM

## 2021-11-23 DIAGNOSIS — Z09 POSTOP CHECK: ICD-10-CM

## 2021-11-23 LAB
BILIRUB SERPL-MCNC: NEGATIVE MG/DL
BLOOD URINE, POC: NEGATIVE
CLARITY, POC UA: CLEAR
COLOR, POC UA: YELLOW
GLUCOSE UR QL STRIP: NEGATIVE
KETONES UR QL STRIP: NEGATIVE
LEUKOCYTE ESTERASE URINE, POC: NEGATIVE
NITRITE, POC UA: NEGATIVE
PH, POC UA: 5
PROTEIN, POC: ABNORMAL
SPECIFIC GRAVITY, POC UA: 1
UROBILINOGEN, POC UA: NORMAL

## 2021-11-23 PROCEDURE — 99024 PR POST-OP FOLLOW-UP VISIT: ICD-10-PCS | Mod: POP,,, | Performed by: NURSE PRACTITIONER

## 2021-11-23 PROCEDURE — 81002 URINALYSIS NONAUTO W/O SCOPE: CPT | Mod: PBBFAC,PO | Performed by: NURSE PRACTITIONER

## 2021-11-23 PROCEDURE — 99214 OFFICE O/P EST MOD 30 MIN: CPT | Mod: PBBFAC,PO | Performed by: NURSE PRACTITIONER

## 2021-11-23 PROCEDURE — 99999 PR PBB SHADOW E&M-EST. PATIENT-LVL IV: CPT | Mod: PBBFAC,,, | Performed by: NURSE PRACTITIONER

## 2021-11-23 PROCEDURE — 99024 POSTOP FOLLOW-UP VISIT: CPT | Mod: POP,,, | Performed by: NURSE PRACTITIONER

## 2021-11-23 PROCEDURE — 99999 PR PBB SHADOW E&M-EST. PATIENT-LVL IV: ICD-10-PCS | Mod: PBBFAC,,, | Performed by: NURSE PRACTITIONER

## 2021-12-14 DIAGNOSIS — R20.0 NUMBNESS: ICD-10-CM

## 2021-12-14 DIAGNOSIS — I10 ESSENTIAL HYPERTENSION: ICD-10-CM

## 2021-12-14 RX ORDER — LOSARTAN POTASSIUM 25 MG/1
25 TABLET ORAL DAILY
Qty: 90 TABLET | Refills: 1 | Status: SHIPPED | OUTPATIENT
Start: 2021-12-14 | End: 2022-04-05 | Stop reason: SDUPTHER

## 2021-12-14 RX ORDER — GABAPENTIN 100 MG/1
100 CAPSULE ORAL 3 TIMES DAILY
Qty: 270 CAPSULE | Refills: 1 | Status: SHIPPED | OUTPATIENT
Start: 2021-12-14 | End: 2022-06-13 | Stop reason: SDUPTHER

## 2021-12-22 ENCOUNTER — OFFICE VISIT (OUTPATIENT)
Dept: UROGYNECOLOGY | Facility: CLINIC | Age: 86
End: 2021-12-22
Payer: MEDICARE

## 2021-12-22 VITALS
WEIGHT: 186.31 LBS | HEART RATE: 82 BPM | BODY MASS INDEX: 31.81 KG/M2 | SYSTOLIC BLOOD PRESSURE: 151 MMHG | HEIGHT: 64 IN | DIASTOLIC BLOOD PRESSURE: 67 MMHG

## 2021-12-22 DIAGNOSIS — Z09 POSTOP CHECK: Primary | ICD-10-CM

## 2021-12-22 PROCEDURE — 99213 OFFICE O/P EST LOW 20 MIN: CPT | Mod: PBBFAC,PO | Performed by: OBSTETRICS & GYNECOLOGY

## 2021-12-22 PROCEDURE — 99024 POSTOP FOLLOW-UP VISIT: CPT | Mod: POP,,, | Performed by: OBSTETRICS & GYNECOLOGY

## 2021-12-22 PROCEDURE — 99024 PR POST-OP FOLLOW-UP VISIT: ICD-10-PCS | Mod: POP,,, | Performed by: OBSTETRICS & GYNECOLOGY

## 2021-12-22 PROCEDURE — 99999 PR PBB SHADOW E&M-EST. PATIENT-LVL III: CPT | Mod: PBBFAC,,, | Performed by: OBSTETRICS & GYNECOLOGY

## 2021-12-22 PROCEDURE — 99999 PR PBB SHADOW E&M-EST. PATIENT-LVL III: ICD-10-PCS | Mod: PBBFAC,,, | Performed by: OBSTETRICS & GYNECOLOGY

## 2022-01-03 ENCOUNTER — OFFICE VISIT (OUTPATIENT)
Dept: FAMILY MEDICINE | Facility: CLINIC | Age: 87
End: 2022-01-03
Payer: MEDICARE

## 2022-01-03 VITALS
WEIGHT: 186 LBS | RESPIRATION RATE: 14 BRPM | BODY MASS INDEX: 31.76 KG/M2 | HEIGHT: 64 IN | OXYGEN SATURATION: 99 % | DIASTOLIC BLOOD PRESSURE: 72 MMHG | HEART RATE: 68 BPM | SYSTOLIC BLOOD PRESSURE: 130 MMHG | TEMPERATURE: 98 F

## 2022-01-03 DIAGNOSIS — M25.552 PAIN OF LEFT HIP JOINT: Primary | ICD-10-CM

## 2022-01-03 DIAGNOSIS — Z13.6 ENCOUNTER FOR SCREENING FOR CARDIOVASCULAR DISORDERS: ICD-10-CM

## 2022-01-03 DIAGNOSIS — M51.36 DEGENERATIVE DISC DISEASE, LUMBAR: ICD-10-CM

## 2022-01-03 DIAGNOSIS — R79.9 ABNORMAL FINDING OF BLOOD CHEMISTRY, UNSPECIFIED: ICD-10-CM

## 2022-01-03 DIAGNOSIS — Z13.220 SCREENING FOR LIPID DISORDERS: ICD-10-CM

## 2022-01-03 PROCEDURE — 99999 PR PBB SHADOW E&M-EST. PATIENT-LVL IV: ICD-10-PCS | Mod: PBBFAC,,, | Performed by: FAMILY MEDICINE

## 2022-01-03 PROCEDURE — 99213 PR OFFICE/OUTPT VISIT, EST, LEVL III, 20-29 MIN: ICD-10-PCS | Mod: S$PBB,,, | Performed by: FAMILY MEDICINE

## 2022-01-03 PROCEDURE — 99213 OFFICE O/P EST LOW 20 MIN: CPT | Mod: S$PBB,,, | Performed by: FAMILY MEDICINE

## 2022-01-03 PROCEDURE — 99214 OFFICE O/P EST MOD 30 MIN: CPT | Mod: PBBFAC | Performed by: FAMILY MEDICINE

## 2022-01-03 PROCEDURE — 99999 PR PBB SHADOW E&M-EST. PATIENT-LVL IV: CPT | Mod: PBBFAC,,, | Performed by: FAMILY MEDICINE

## 2022-01-03 RX ORDER — TRAMADOL HYDROCHLORIDE 50 MG/1
50 TABLET ORAL EVERY 12 HOURS PRN
Qty: 60 TABLET | Refills: 0 | Status: SHIPPED | OUTPATIENT
Start: 2022-01-03 | End: 2022-04-05

## 2022-01-03 NOTE — PROGRESS NOTES
[facility] - Clinic Note    Subjective      Ms. Pedroza is a 87 y.o. female who presents to clinic for Follow-up (3mth follow up)    Doing well postoperatively.  No new complaints.  Pain medication is helping.    PM Mahsa has a past medical history of Arthritis, Cataracts, bilateral, Osteopenia, and Thyroid disease.   PSXH Mahsa has a past surgical history that includes Eye surgery; lamenectomy; Total replacement of both hip joints using computer-assisted navigation; Gallbladder surgery; Dilation and curettage of uterus; Hysteroscopy with dilation and curettage of uterus (N/A, 5/27/2019); Hysteroscopic surgical procedure (N/A, 5/27/2019); Cholecystectomy; Epidural steroid injection (N/A, 12/23/2020); Joint replacement (Bilateral); Robot-assisted subtotal hysterectomy (N/A, 11/9/2021); Robot-assisted laparoscopic abdominal sacrocolpopexy using da Lizet Xi (N/A, 11/9/2021); and Robot-assisted laparoscopic salpingo-oophorectomy using da Lizet Xi (Bilateral, 11/9/2021).    Mahsa's family history is not on file.   DANII Bragg reports that she has never smoked. She has never used smokeless tobacco. She reports previous alcohol use. She reports that she does not use drugs.   BRISA Bragg is allergic to adhesive, morphine, oxymetazoline hcl, sulfa (sulfonamide antibiotics), and xylometazoline hcl.   VAL Bragg has a current medication list which includes the following prescription(s): chlorhexidine, diclofenac sodium, fluticasone propionate, gabapentin, levothyroxine, losartan, estradiol, meloxicam, ondansetron, and tramadol, and the following Facility-Administered Medications: lactated ringers.     Review of Systems   Constitutional: Negative for chills and fever.   Respiratory: Negative for shortness of breath.    Cardiovascular: Negative for chest pain.   Musculoskeletal: Positive for arthralgias.     Objective     /72 (BP Location: Right arm, Patient Position: Sitting, BP Method: Large (Manual))    "Pulse 68   Temp 98 °F (36.7 °C) (Oral)   Resp 14   Ht 5' 4" (1.626 m)   Wt 84.4 kg (186 lb)   SpO2 99%   BMI 31.93 kg/m²     Physical Exam  Vitals and nursing note reviewed.   Constitutional:       General: She is not in acute distress.     Appearance: Normal appearance. She is well-developed. She is not diaphoretic.   HENT:      Head: Normocephalic and atraumatic.      Right Ear: External ear normal.      Left Ear: External ear normal.   Eyes:      General:         Right eye: No discharge.         Left eye: No discharge.   Cardiovascular:      Rate and Rhythm: Normal rate and regular rhythm.      Heart sounds: Normal heart sounds.   Pulmonary:      Effort: Pulmonary effort is normal.      Breath sounds: Normal breath sounds. No wheezing or rales.   Skin:     General: Skin is warm and dry.   Neurological:      Mental Status: She is alert and oriented to person, place, and time. Mental status is at baseline.   Psychiatric:         Mood and Affect: Mood normal.         Behavior: Behavior normal.         Thought Content: Thought content normal.         Judgment: Judgment normal.        Assessment/Plan     1. Pain of left hip joint  traMADoL (ULTRAM) 50 mg tablet   2. Abnormal finding of blood chemistry, unspecified  Basic Metabolic Panel    Magnesium   3. Screening for lipid disorders  Lipid Panel   4. Encounter for screening for cardiovascular disorders   Lipid Panel   5. Degenerative disc disease, lumbar  traMADoL (ULTRAM) 50 mg tablet     Refilled tramadol.   reviewed.  No red flags.  Continue current medications as prescribed.  Lab work in March with follow up afterward for lab review.    Future Appointments   Date Time Provider Department Center   3/23/2022  8:00 AM [facility], LABORATORY [facility] LAB [facility]   4/5/2022  9:40 AM Braden Newby MD [facility]         Braden Newby MD  Family Medicine [facility]            "

## 2022-03-04 ENCOUNTER — HOSPITAL ENCOUNTER (EMERGENCY)
Facility: HOSPITAL | Age: 87
Discharge: HOME | End: 2022-03-04
Attending: INTERNAL MEDICINE
Payer: MEDICARE

## 2022-03-04 VITALS
BODY MASS INDEX: 31.76 KG/M2 | WEIGHT: 186 LBS | TEMPERATURE: 98 F | DIASTOLIC BLOOD PRESSURE: 70 MMHG | SYSTOLIC BLOOD PRESSURE: 161 MMHG | RESPIRATION RATE: 14 BRPM | HEART RATE: 80 BPM | HEIGHT: 64 IN | OXYGEN SATURATION: 100 %

## 2022-03-04 DIAGNOSIS — I16.0 HYPERTENSIVE URGENCY: Primary | ICD-10-CM

## 2022-03-04 DIAGNOSIS — R07.9 CHEST PAIN: ICD-10-CM

## 2022-03-04 DIAGNOSIS — G89.4 CHRONIC PAIN SYNDROME: ICD-10-CM

## 2022-03-04 LAB
ALBUMIN SERPL BCP-MCNC: 4.2 G/DL (ref 3.5–5.2)
ALP SERPL-CCNC: 62 U/L (ref 55–135)
ALT SERPL W/O P-5'-P-CCNC: 21 U/L (ref 10–44)
ANION GAP SERPL CALC-SCNC: 7 MMOL/L (ref 8–16)
AST SERPL-CCNC: 25 U/L (ref 10–40)
BASOPHILS # BLD AUTO: 0.03 K/UL (ref 0–0.2)
BASOPHILS NFR BLD: 0.5 % (ref 0–1.9)
BILIRUB SERPL-MCNC: 0.5 MG/DL (ref 0.1–1)
BNP SERPL-MCNC: 30 PG/ML (ref 0–99)
BUN SERPL-MCNC: 16 MG/DL (ref 8–23)
CALCIUM SERPL-MCNC: 8.8 MG/DL (ref 8.7–10.5)
CHLORIDE SERPL-SCNC: 95 MMOL/L (ref 95–110)
CO2 SERPL-SCNC: 25 MMOL/L (ref 23–29)
CREAT SERPL-MCNC: 0.7 MG/DL (ref 0.5–1.4)
D DIMER PPP IA.FEU-MCNC: 0.35 MG/L FEU
DIFFERENTIAL METHOD: ABNORMAL
EOSINOPHIL # BLD AUTO: 0.1 K/UL (ref 0–0.5)
EOSINOPHIL NFR BLD: 1.7 % (ref 0–8)
ERYTHROCYTE [DISTWIDTH] IN BLOOD BY AUTOMATED COUNT: 12.7 % (ref 11.5–14.5)
EST. GFR  (AFRICAN AMERICAN): >60 ML/MIN/1.73 M^2
EST. GFR  (NON AFRICAN AMERICAN): >60 ML/MIN/1.73 M^2
GLUCOSE SERPL-MCNC: 102 MG/DL (ref 70–110)
HCT VFR BLD AUTO: 37.4 % (ref 37–48.5)
HGB BLD-MCNC: 12.7 G/DL (ref 12–16)
IMM GRANULOCYTES # BLD AUTO: 0.03 K/UL (ref 0–0.04)
IMM GRANULOCYTES NFR BLD AUTO: 0.5 % (ref 0–0.5)
LYMPHOCYTES # BLD AUTO: 2.3 K/UL (ref 1–4.8)
LYMPHOCYTES NFR BLD: 35.7 % (ref 18–48)
MCH RBC QN AUTO: 29.4 PG (ref 27–31)
MCHC RBC AUTO-ENTMCNC: 34 G/DL (ref 32–36)
MCV RBC AUTO: 87 FL (ref 82–98)
MONOCYTES # BLD AUTO: 0.7 K/UL (ref 0.3–1)
MONOCYTES NFR BLD: 10.5 % (ref 4–15)
NEUTROPHILS # BLD AUTO: 3.3 K/UL (ref 1.8–7.7)
NEUTROPHILS NFR BLD: 51.1 % (ref 38–73)
NRBC BLD-RTO: 0 /100 WBC
PLATELET # BLD AUTO: 229 K/UL (ref 150–450)
PMV BLD AUTO: 9 FL (ref 9.2–12.9)
POTASSIUM SERPL-SCNC: 5.1 MMOL/L (ref 3.5–5.1)
PROT SERPL-MCNC: 7 G/DL (ref 6–8.4)
RBC # BLD AUTO: 4.32 M/UL (ref 4–5.4)
SARS-COV-2 RDRP RESP QL NAA+PROBE: NEGATIVE
SODIUM SERPL-SCNC: 127 MMOL/L (ref 136–145)
TROPONIN I SERPL DL<=0.01 NG/ML-MCNC: <0.006 NG/ML (ref 0–0.03)
WBC # BLD AUTO: 6.47 K/UL (ref 3.9–12.7)

## 2022-03-04 PROCEDURE — 93010 EKG 12-LEAD: ICD-10-PCS | Mod: ,,, | Performed by: INTERNAL MEDICINE

## 2022-03-04 PROCEDURE — 63600175 PHARM REV CODE 636 W HCPCS: Performed by: INTERNAL MEDICINE

## 2022-03-04 PROCEDURE — 93005 ELECTROCARDIOGRAM TRACING: CPT

## 2022-03-04 PROCEDURE — 99285 EMERGENCY DEPT VISIT HI MDM: CPT | Mod: 25

## 2022-03-04 PROCEDURE — 80053 COMPREHEN METABOLIC PANEL: CPT | Performed by: INTERNAL MEDICINE

## 2022-03-04 PROCEDURE — 93010 ELECTROCARDIOGRAM REPORT: CPT | Mod: ,,, | Performed by: INTERNAL MEDICINE

## 2022-03-04 PROCEDURE — 71045 X-RAY EXAM CHEST 1 VIEW: CPT | Mod: TC,FY

## 2022-03-04 PROCEDURE — 96374 THER/PROPH/DIAG INJ IV PUSH: CPT

## 2022-03-04 PROCEDURE — 71045 XR CHEST AP PORTABLE: ICD-10-PCS | Mod: 26,,, | Performed by: RADIOLOGY

## 2022-03-04 PROCEDURE — 36415 COLL VENOUS BLD VENIPUNCTURE: CPT | Performed by: INTERNAL MEDICINE

## 2022-03-04 PROCEDURE — U0002 COVID-19 LAB TEST NON-CDC: HCPCS | Performed by: INTERNAL MEDICINE

## 2022-03-04 PROCEDURE — 25000003 PHARM REV CODE 250: Performed by: INTERNAL MEDICINE

## 2022-03-04 PROCEDURE — 85025 COMPLETE CBC W/AUTO DIFF WBC: CPT | Performed by: INTERNAL MEDICINE

## 2022-03-04 PROCEDURE — 83880 ASSAY OF NATRIURETIC PEPTIDE: CPT | Performed by: INTERNAL MEDICINE

## 2022-03-04 PROCEDURE — 85379 FIBRIN DEGRADATION QUANT: CPT | Performed by: INTERNAL MEDICINE

## 2022-03-04 PROCEDURE — 71045 X-RAY EXAM CHEST 1 VIEW: CPT | Mod: 26,,, | Performed by: RADIOLOGY

## 2022-03-04 PROCEDURE — 84484 ASSAY OF TROPONIN QUANT: CPT | Performed by: INTERNAL MEDICINE

## 2022-03-04 RX ORDER — KETOROLAC TROMETHAMINE 30 MG/ML
15 INJECTION, SOLUTION INTRAMUSCULAR; INTRAVENOUS
Status: COMPLETED | OUTPATIENT
Start: 2022-03-04 | End: 2022-03-04

## 2022-03-04 RX ORDER — HYDRALAZINE HYDROCHLORIDE 20 MG/ML
10 INJECTION INTRAMUSCULAR; INTRAVENOUS
Status: DISCONTINUED | OUTPATIENT
Start: 2022-03-04 | End: 2022-03-04 | Stop reason: HOSPADM

## 2022-03-04 RX ORDER — ORPHENADRINE CITRATE 30 MG/ML
30 INJECTION INTRAMUSCULAR; INTRAVENOUS
Status: DISCONTINUED | OUTPATIENT
Start: 2022-03-04 | End: 2022-03-04 | Stop reason: HOSPADM

## 2022-03-04 RX ORDER — ASPIRIN 325 MG
325 TABLET ORAL
Status: COMPLETED | OUTPATIENT
Start: 2022-03-04 | End: 2022-03-04

## 2022-03-04 RX ADMIN — KETOROLAC TROMETHAMINE 15 MG: 30 INJECTION, SOLUTION INTRAMUSCULAR; INTRAVENOUS at 02:03

## 2022-03-04 RX ADMIN — ASPIRIN 325 MG ORAL TABLET 325 MG: 325 PILL ORAL at 01:03

## 2022-03-04 NOTE — ED PROVIDER NOTES
Encounter Date: 3/4/2022       History     Chief Complaint   Patient presents with    Hypertension     Patient with hypertension, anxiety, and c/o muscle cramping in legs via EMS. Patient c/o chest pain/tightness upon arrival onset earlier today.      PATIENT BROUGHT IN BY AMBULANCE COMPLAINT OF UNCONTROLLED HIGH BLOOD PRESSURE.  THE PATIENT STATES SHE HAS HAD HYPERTENSION FOR 1 YEAR AND HAD BLOOD PRESSURE CONTROL WITH CLONIDINE ON A P.R.N. BASIS AND LISINOPRIL.  PATIENT STATES THIS MORNING THAT SHE FELT PRESSURE IN HER CHEST WITH HIGH BLOOD PRESSURE NO PREVIOUS HISTORY ANY CARDIAC PROBLEMS.  SHE DOES COMPLAIN GENERALIZED MUSCLE CRAMPS.  SHE HAS CHRONIC PAIN FROM ARTHRITIS WHICH SHE TAKES PAIN MEDICINE.  PATIENT STATES SHE IS AMBULATORY WITHOUT A WALKER ASSISTANCE WAS ABLE TO GET OUT AND ABOUT ON YESTERDAY.        Review of patient's allergies indicates:   Allergen Reactions    Adhesive Rash    Morphine     Oxymetazoline hcl Rash    Sulfa (sulfonamide antibiotics) Nausea And Vomiting    Xylometazoline hcl      Past Medical History:   Diagnosis Date    Arthritis     Cataracts, bilateral     Osteopenia     Thyroid disease      Past Surgical History:   Procedure Laterality Date    CHOLECYSTECTOMY      DILATION AND CURETTAGE OF UTERUS      EPIDURAL STEROID INJECTION N/A 12/23/2020    Procedure: RACHAEL Caudal;  Surgeon: Jim Ugalde MD;  Location: [facility] OR;  Service: Pain Management;  Laterality: N/A;    EYE SURGERY      GALLBLADDER SURGERY      HYSTEROSCOPIC SURGICAL PROCEDURE N/A 5/27/2019    Procedure: HYSTEROSCOPY, THERAPEUTIC;  Surgeon: Gordon Cedillo MD;  Location: [facility] OR;  Service: OB/GYN;  Laterality: N/A;  [facility]    HYSTEROSCOPY WITH DILATION AND CURETTAGE OF UTERUS N/A 5/27/2019    Procedure: HYSTEROSCOPY, WITH DILATION AND CURETTAGE OF UTERUS;  Surgeon: Gordon Cedillo MD;  Location: [facility] OR;  Service: OB/GYN;  Laterality: N/A;    JOINT REPLACEMENT Bilateral     Hips     lamenectomy      ROBOT-ASSISTED LAPAROSCOPIC ABDOMINAL SACROCOLPOPEXY USING DA DANETTE XI N/A 11/9/2021    Procedure: XI ROBOTIC SACROCOLPOPEXY, ABDOMINAL;  Surgeon: Reza Napoles MD;  Location: [facility] OR;  Service: OB/GYN;  Laterality: N/A;    ROBOT-ASSISTED LAPAROSCOPIC SALPINGO-OOPHORECTOMY USING DA DANETTE XI Bilateral 11/9/2021    Procedure: XI ROBOTIC SALPINGO-OOPHORECTOMY;  Surgeon: Reza Napoles MD;  Location: [facility] OR;  Service: OB/GYN;  Laterality: Bilateral;    ROBOT-ASSISTED SUBTOTAL HYSTERECTOMY N/A 11/9/2021    Procedure: ROBOTIC HYSTERECTOMY, SUPRACERVICAL;  Surgeon: Reza Napoles MD;  Location: [facility] OR;  Service: OB/GYN;  Laterality: N/A;    TOTAL REPLACEMENT OF BOTH HIP JOINTS USING COMPUTER-ASSISTED NAVIGATION       No family history on file.  Social History     Tobacco Use    Smoking status: Never Smoker    Smokeless tobacco: Never Used   Substance Use Topics    Alcohol use: Not Currently    Drug use: Never     Review of Systems   Constitutional: Negative for fever.   HENT: Negative for sore throat.    Respiratory: Negative for shortness of breath.    Cardiovascular: Negative for chest pain.   Gastrointestinal: Negative for nausea.   Genitourinary: Negative for dysuria.   Musculoskeletal: Negative for back pain.   Skin: Negative for rash.   Neurological: Negative for weakness.   Hematological: Does not bruise/bleed easily.       Physical Exam     Initial Vitals [03/04/22 1309]   BP Pulse Resp Temp SpO2   (!) 178/89 81 17 98.3 °F (36.8 °C) 97 %      MAP       --         Physical Exam    Vitals reviewed.  Constitutional: She appears well-developed.   Eyes: Pupils are equal, round, and reactive to light.   Neck:   Normal range of motion.  Cardiovascular: Normal rate.   Pulmonary/Chest: Breath sounds normal.   Abdominal: Abdomen is soft.   Musculoskeletal:         General: Normal range of motion.      Cervical back: Normal range of motion.     Neurological: She is alert.   Skin: Skin is  warm.   Psychiatric: She has a normal mood and affect.         ED Course   Procedures  Labs Reviewed   CBC W/ AUTO DIFFERENTIAL - Abnormal; Notable for the following components:       Result Value    MPV 9.0 (*)     All other components within normal limits   COMPREHENSIVE METABOLIC PANEL - Abnormal; Notable for the following components:    Sodium 127 (*)     Anion Gap 7 (*)     All other components within normal limits   TROPONIN I   B-TYPE NATRIURETIC PEPTIDE   D DIMER, QUANTITATIVE   SARS-COV-2 RNA AMPLIFICATION, QUAL    Narrative:     Is the patient symptomatic?->No        ECG Results          EKG 12-lead (Preliminary result)  Result time 03/04/22 13:22:46    ED Interpretation by Adolfo Del Rosario MD (03/04/22 13:22:46, [facility], Emergency Medicine)    NORMAL SINUS RHYTHM WITH RATE OF 73 AND NO ACUTE ISCHEMIC CHANGES AND NO CHANGE FROM PREVIOUS EKGS.                            Imaging Results          X-Ray Chest AP Portable (Final result)  Result time 03/04/22 13:39:54    Final result by Reagan Calabrese MD (03/04/22 13:39:54)                 Impression:      1. Mild chronic interstitial change with bibasilar dependent atelectasis.  2. Mild cardiac enlargement.      Electronically signed by: Reagan Calabrese  Date:    03/04/2022  Time:    13:39             Narrative:    EXAMINATION:  XR CHEST AP PORTABLE    CLINICAL HISTORY:  Chest Pain;    TECHNIQUE:  Single frontal view of the chest was performed.    COMPARISON:  10/25/2021.    FINDINGS:  There is mild chronic interstitial change.  Mild pulmonary hypoinflation.  Dependent discoid atelectasis at the lung bases.    Heart size is mildly enlarged.  Calcified aortic plaque.  Trachea midline.    Bony thorax intact.                                 Medications   hydrALAZINE injection 10 mg (10 mg Intravenous Not Given 3/4/22 1330)   orphenadrine injection 30 mg (has no administration in time range)   aspirin tablet 325 mg (325 mg Oral Given 3/4/22 1343)    ketorolac injection 15 mg (15 mg Intravenous Given 3/4/22 1410)                 ED Course as of 03/04/22 1457   Fri Mar 04, 2022   1322 EKG 12-lead [PW]   1339 CBC auto differential(!) [PW]   1344 BLOOD PRESSURE HAS DECREASED  SYSTOLIC, WILL HOLD OFF ON HYDRALAZINE [PW]   1352 PATIENT IS A CHRONIC PAIN BUT DOES NOT WANT NARCOTICS CAUSE CAUSE CONSTIPATION. [PW]   1404 D-Dimer: 0.35 [PW]   1412 Comprehensive metabolic panel(!) [PW]   1412 Sodium(!): 127 [PW]   1434 Troponin I: <0.006 [PW]   1434 BNP: 30 [PW]   1448 SARS-CoV-2 RNA, Amplification, Qual: Negative [PW]      ED Course User Index  [PW] Adolfo Del Rosario MD             Clinical Impression:   Final diagnoses:  [R07.9] Chest pain  [I16.0] Hypertensive urgency (Primary)  [G89.4] Chronic pain syndrome          ED Disposition Condition    Discharge Stable        ED Prescriptions     None        Follow-up Information     Follow up With Specialties Details Why Contact Info    Braden Newby MD Family Medicine In 3 days  [address] [facility] [place] [address]  [phone]             Adolfo Del Rosario MD  03/04/22 1451       Adolfo Del Rosario MD  03/04/22 1452

## 2022-03-04 NOTE — ED NOTES
Pt states that she has not been feeling well for the last several of days. Pt states that this morning her family left this am and she was not feeling well. Pt that this morning she took her blood pressure  And it was high. Pt states that she has been having generalized body aches with abd pain pt states that she feels like she is constipated. Pt states that she has been different pain in her body. Pt states that the pains come and go. Pt states that some times she has sob but that it is not anything new.

## 2022-03-04 NOTE — ED TRIAGE NOTES
Patient to ER via ambulance services from home. Patient c/o hypertensive episode today with hx of high blood pressure. Also reports chest pain/pressure onset earlier today. Patient also reports she is anxious and also having cramping in legs. Patient hypertensive upon arrival. GCS 15. Laughing and joking with staff. No apparent distress noted.

## 2022-03-07 ENCOUNTER — NURSE TRIAGE (OUTPATIENT)
Dept: ADMINISTRATIVE | Facility: CLINIC | Age: 87
End: 2022-03-07
Payer: MEDICARE

## 2022-03-07 NOTE — TELEPHONE ENCOUNTER
Pt calling with elevated b/p, states that b/p was 250/100 about 20 minutes prior to calling, states she took her b/p pill then, while speaking to triage RN she took her PRN clonadine. Blood pressure while on the phone was 246/107. Denies any symptoms. Per protocol advised to be seen within 4 hours. Verbalized understanding. Will route message.     Reason for Disposition   [1] Systolic BP  >= 200 OR Diastolic >= 120  AND [2] having NO cardiac or neurologic symptoms    Additional Information   Negative: Difficult to awaken or acting confused (e.g., disoriented, slurred speech)   Negative: SEVERE difficulty breathing (e.g., struggling for each breath, speaks in single words)   Negative: [1] Weakness of the face, arm or leg on one side of the body AND [2] new-onset   Negative: [1] Numbness (i.e., loss of sensation) of the face, arm or leg on one side of the body AND [2] new-onset   Negative: [1] Chest pain lasts > 5 minutes AND [2] history of heart disease  (i.e., heart attack, bypass surgery, angina, angioplasty, CHF)   Negative: [1] Chest pain AND [2] took nitrogylcerin AND [3] pain was not relieved   Negative: Sounds like a life-threatening emergency to the triager   Negative: [1] Systolic BP  >= 160 OR Diastolic >= 100 AND [2] cardiac or neurologic symptoms (e.g., chest pain, difficulty breathing, unsteady gait, blurred vision)   Negative: [1] Pregnant 20 or more weeks (or postpartum < 6 weeks) AND [2] new hand or face swelling   Negative: [1] Pregnant 20 or more weeks AND [2] Systolic BP  >= 140 OR Diastolic >= 90    Protocols used: BLOOD PRESSURE - HIGH-A-

## 2022-03-08 ENCOUNTER — TELEPHONE (OUTPATIENT)
Dept: FAMILY MEDICINE | Facility: CLINIC | Age: 87
End: 2022-03-08
Payer: MEDICARE

## 2022-03-08 DIAGNOSIS — I10 ESSENTIAL HYPERTENSION: Primary | ICD-10-CM

## 2022-03-08 DIAGNOSIS — M25.552 PAIN OF LEFT HIP JOINT: ICD-10-CM

## 2022-03-08 DIAGNOSIS — F41.9 ANXIETY: ICD-10-CM

## 2022-03-08 NOTE — TELEPHONE ENCOUNTER
Patient having elevated blood pressure. 180/78, 160/??, 158/58. Taking Clonidine. BP up and down. She has been having dizzy spells, Uses a walker. Constipation and stimulant dependant. I sp[angelo with [facility] and gave MRN. We just need to send order. Thanks

## 2022-03-08 NOTE — TELEPHONE ENCOUNTER
I spoke with patient and she stated she has been having episodes of high blood pressure. Last night it was 197/91 and she took a Clindine and stated it came down but doesn't remember the number. She forgot Dr Sethi told her to take this medication when her BP is high. She went to ER they stated she is on Lisinopril, I don't see in her chart but Cozaar is. She doesn't know which one she is taking. . She was taking a stimulant for constipation and her GYN advised her to discontinue and to use a stool softener. Patient stated she has tried that and an enema and still no bowel movement so she took Dulcolax this morning and awaiting results. She also stated she has been dizzy when she gets up and needs to sit and wait till it stops. I urged her to schedule an appt to be check, she has one on April 5 and declined sooner because her daughter is sick and she would take her. I advised her to keep a BP log, and every time she takes Clondine and to wait and retake for BP. I told her I would ask and I would call her back. Please advise Thanks

## 2022-03-12 PROCEDURE — G0180 PR HOME HEALTH MD CERTIFICATION: ICD-10-PCS | Mod: ,,, | Performed by: FAMILY MEDICINE

## 2022-03-12 PROCEDURE — G0180 MD CERTIFICATION HHA PATIENT: HCPCS | Mod: ,,, | Performed by: FAMILY MEDICINE

## 2022-03-17 ENCOUNTER — NURSE TRIAGE (OUTPATIENT)
Dept: ADMINISTRATIVE | Facility: CLINIC | Age: 87
End: 2022-03-17
Payer: MEDICARE

## 2022-03-17 NOTE — TELEPHONE ENCOUNTER
"Spoke with patient concerning blood pressure. Stated she took her bp and it was "215/1 something" So she told her kids she was taking a clonidine and her children fussed at her so she took her regular medication. About an hour after her bp was "175 or 185 over something"  She wanted to start taking her bp medication in the morning. The HHN came this morning to see her. I explained to her if he bp doesn't come down she needs to take clonidine as it is prescribed to her for that. I also advised her to call 911 or go to the ED if she feels she need immediate  medical attention. Patient voiced understanding and thanked me for calling.     "

## 2022-03-17 NOTE — TELEPHONE ENCOUNTER
Patient states her bp at about 3:30pm hr bp was 200/89. She says she's cold and her fingers on both hands are tingling. Current bp is 171/71, p 77. Care advice discussed, understanding verbalized. Patient states she will make her appointment herself. Please contact caller directly to discuss any further care advice and appointment.    Reason for Disposition   Systolic BP  >= 160 OR Diastolic >= 100    Additional Information   Negative: Difficult to awaken or acting confused (e.g., disoriented, slurred speech)   Negative: SEVERE difficulty breathing (e.g., struggling for each breath, speaks in single words)   Negative: [1] Weakness of the face, arm or leg on one side of the body AND [2] new-onset   Negative: [1] Numbness (i.e., loss of sensation) of the face, arm or leg on one side of the body AND [2] new-onset   Negative: [1] Chest pain lasts > 5 minutes AND [2] history of heart disease  (i.e., heart attack, bypass surgery, angina, angioplasty, CHF)   Negative: [1] Chest pain AND [2] took nitrogylcerin AND [3] pain was not relieved   Negative: Sounds like a life-threatening emergency to the triager   Negative: [1] Systolic BP  >= 160 OR Diastolic >= 100 AND [2] cardiac or neurologic symptoms (e.g., chest pain, difficulty breathing, unsteady gait, blurred vision)   Negative: [1] Pregnant 20 or more weeks (or postpartum < 6 weeks) AND [2] new hand or face swelling   Negative: [1] Pregnant 20 or more weeks AND [2] Systolic BP  >= 140 OR Diastolic >= 90   Negative: [1] Systolic BP  >= 200 OR Diastolic >= 120  AND [2] having NO cardiac or neurologic symptoms   Negative: [1] Postpartum < 6 weeks AND [2] Systolic BP  >= 140 OR Diastolic >= 90   Negative: [1] Systolic BP  >= 180 OR Diastolic >= 110 AND [2] missed most recent dose of blood pressure medication   Negative: Systolic BP  >= 180 OR Diastolic >= 110   Negative: Ran out of BP medications    Protocols used: [facility]

## 2022-03-18 ENCOUNTER — TELEPHONE (OUTPATIENT)
Dept: FAMILY MEDICINE | Facility: CLINIC | Age: 87
End: 2022-03-18
Payer: MEDICARE

## 2022-03-18 ENCOUNTER — EXTERNAL HOME HEALTH (OUTPATIENT)
Dept: HOME HEALTH SERVICES | Facility: HOSPITAL | Age: 87
End: 2022-03-18
Payer: MEDICARE

## 2022-03-18 NOTE — TELEPHONE ENCOUNTER
Patient has an appt on 04/05 for follow up and a lab appt on 3/23. She would like to discuss personal issues she is having but does not want family to know.

## 2022-03-22 ENCOUNTER — TELEPHONE (OUTPATIENT)
Dept: FAMILY MEDICINE | Facility: CLINIC | Age: 87
End: 2022-03-22
Payer: MEDICARE

## 2022-03-22 NOTE — TELEPHONE ENCOUNTER
Spoke with patient, she had a question about e-registering for her upcoming lab appt. Informed her she can do this that it helps to save time when she comes. Patient voiced understanding.

## 2022-03-22 NOTE — TELEPHONE ENCOUNTER
Returned call to patient. Patient would like Rashmi to give her a call. Informed patient I would send Rashmi a message. Patient voiced understanding.

## 2022-03-22 NOTE — TELEPHONE ENCOUNTER
----- Message from Kathy Gomez sent at 3/22/2022 12:36 PM CDT -----  Contact: patient  Type: Needs Medical Advice  Who Called:  patient  Best Call Back Number: [phone] (home) [phone] (work)  Additional Information: patient is requesting a call back from Rashmi

## 2022-03-22 NOTE — TELEPHONE ENCOUNTER
----- Message from Quiana Barboza sent at 3/22/2022 11:15 AM CDT -----  .Type: Patient Call Back    Who called: self     What is the request in detail: pt is requesting a call back to discuss a personal matter, she did not give me any details     Can the clinic reply by [facility]?    Would the patient rather a call back or a response via [facility]? Call back     Best call back number: [phone] (home) [phone] (work)    Thank you

## 2022-03-23 ENCOUNTER — LAB VISIT (OUTPATIENT)
Dept: LAB | Facility: HOSPITAL | Age: 87
End: 2022-03-23
Attending: FAMILY MEDICINE
Payer: MEDICARE

## 2022-03-23 DIAGNOSIS — Z13.220 SCREENING FOR LIPID DISORDERS: ICD-10-CM

## 2022-03-23 DIAGNOSIS — R79.9 ABNORMAL FINDING OF BLOOD CHEMISTRY, UNSPECIFIED: ICD-10-CM

## 2022-03-23 DIAGNOSIS — Z13.6 ENCOUNTER FOR SCREENING FOR CARDIOVASCULAR DISORDERS: ICD-10-CM

## 2022-03-23 LAB
ANION GAP SERPL CALC-SCNC: 10 MMOL/L (ref 8–16)
BUN SERPL-MCNC: 11 MG/DL (ref 8–23)
CALCIUM SERPL-MCNC: 9.5 MG/DL (ref 8.7–10.5)
CHLORIDE SERPL-SCNC: 101 MMOL/L (ref 95–110)
CHOLEST SERPL-MCNC: 236 MG/DL (ref 120–199)
CHOLEST/HDLC SERPL: 3.7 {RATIO} (ref 2–5)
CO2 SERPL-SCNC: 26 MMOL/L (ref 23–29)
CREAT SERPL-MCNC: 0.8 MG/DL (ref 0.5–1.4)
EST. GFR  (AFRICAN AMERICAN): >60 ML/MIN/1.73 M^2
EST. GFR  (NON AFRICAN AMERICAN): >60 ML/MIN/1.73 M^2
GLUCOSE SERPL-MCNC: 96 MG/DL (ref 70–110)
HDLC SERPL-MCNC: 63 MG/DL (ref 40–75)
HDLC SERPL: 26.7 % (ref 20–50)
LDLC SERPL CALC-MCNC: 148.4 MG/DL (ref 63–159)
MAGNESIUM SERPL-MCNC: 2.1 MG/DL (ref 1.6–2.6)
NONHDLC SERPL-MCNC: 173 MG/DL
POTASSIUM SERPL-SCNC: 4.5 MMOL/L (ref 3.5–5.1)
SODIUM SERPL-SCNC: 137 MMOL/L (ref 136–145)
TRIGL SERPL-MCNC: 123 MG/DL (ref 30–150)

## 2022-03-23 PROCEDURE — 80048 BASIC METABOLIC PNL TOTAL CA: CPT | Performed by: FAMILY MEDICINE

## 2022-03-23 PROCEDURE — 80061 LIPID PANEL: CPT | Performed by: FAMILY MEDICINE

## 2022-03-23 PROCEDURE — 83735 ASSAY OF MAGNESIUM: CPT | Performed by: FAMILY MEDICINE

## 2022-03-23 PROCEDURE — 36415 COLL VENOUS BLD VENIPUNCTURE: CPT | Performed by: FAMILY MEDICINE

## 2022-04-05 ENCOUNTER — OFFICE VISIT (OUTPATIENT)
Dept: FAMILY MEDICINE | Facility: CLINIC | Age: 87
End: 2022-04-05
Payer: MEDICARE

## 2022-04-05 VITALS
HEART RATE: 73 BPM | WEIGHT: 194.81 LBS | SYSTOLIC BLOOD PRESSURE: 138 MMHG | DIASTOLIC BLOOD PRESSURE: 62 MMHG | HEIGHT: 64 IN | OXYGEN SATURATION: 99 % | BODY MASS INDEX: 33.26 KG/M2 | RESPIRATION RATE: 14 BRPM | TEMPERATURE: 98 F

## 2022-04-05 DIAGNOSIS — F41.9 ANXIETY: ICD-10-CM

## 2022-04-05 DIAGNOSIS — N76.0 BACTERIAL VAGINOSIS: ICD-10-CM

## 2022-04-05 DIAGNOSIS — B96.89 BACTERIAL VAGINOSIS: ICD-10-CM

## 2022-04-05 DIAGNOSIS — I10 ESSENTIAL HYPERTENSION: Primary | ICD-10-CM

## 2022-04-05 PROCEDURE — 99999 PR PBB SHADOW E&M-EST. PATIENT-LVL V: ICD-10-PCS | Mod: PBBFAC,,, | Performed by: FAMILY MEDICINE

## 2022-04-05 PROCEDURE — 99214 OFFICE O/P EST MOD 30 MIN: CPT | Mod: S$PBB,,, | Performed by: FAMILY MEDICINE

## 2022-04-05 PROCEDURE — 99214 PR OFFICE/OUTPT VISIT, EST, LEVL IV, 30-39 MIN: ICD-10-PCS | Mod: S$PBB,,, | Performed by: FAMILY MEDICINE

## 2022-04-05 PROCEDURE — 99215 OFFICE O/P EST HI 40 MIN: CPT | Mod: PBBFAC | Performed by: FAMILY MEDICINE

## 2022-04-05 PROCEDURE — 99999 PR PBB SHADOW E&M-EST. PATIENT-LVL V: CPT | Mod: PBBFAC,,, | Performed by: FAMILY MEDICINE

## 2022-04-05 RX ORDER — LOSARTAN POTASSIUM 50 MG/1
50 TABLET ORAL DAILY
Qty: 90 TABLET | Refills: 3 | Status: SHIPPED | OUTPATIENT
Start: 2022-04-05 | End: 2022-12-29 | Stop reason: SDUPTHER

## 2022-04-05 RX ORDER — METRONIDAZOLE 7.5 MG/G
1 GEL VAGINAL DAILY
Qty: 70 G | Refills: 0 | Status: SHIPPED | OUTPATIENT
Start: 2022-04-05 | End: 2022-04-11

## 2022-04-05 RX ORDER — CLONIDINE HYDROCHLORIDE 0.1 MG/1
0.1 TABLET ORAL DAILY PRN
Qty: 30 TABLET | Refills: 5 | Status: SHIPPED | OUTPATIENT
Start: 2022-04-05 | End: 2023-11-07

## 2022-04-05 RX ORDER — HYDROXYZINE HYDROCHLORIDE 25 MG/1
25 TABLET, FILM COATED ORAL 3 TIMES DAILY PRN
Qty: 90 TABLET | Refills: 3 | Status: SHIPPED | OUTPATIENT
Start: 2022-04-05 | End: 2022-09-16 | Stop reason: ALTCHOICE

## 2022-04-05 NOTE — PROGRESS NOTES
[facility] - Clinic Note    Subjective      Ms. Pedroza is a 87 y.o. female who presents to clinic for Follow-up    Patient reports that her blood pressure has been elevated.  Also has been having some symptoms of bacterial vaginosis.  Was prescribed Metrogel in the past which seemed to help.    [facility] Mahsa has a past medical history of Arthritis, Cataracts, bilateral, Osteopenia, and Thyroid disease.   [facility] Mahsa has a past surgical history that includes Eye surgery; lamenectomy; Total replacement of both hip joints using computer-assisted navigation; Gallbladder surgery; Dilation and curettage of uterus; Hysteroscopy with dilation and curettage of uterus (N/A, 5/27/2019); Hysteroscopic surgical procedure (N/A, 5/27/2019); Cholecystectomy; Epidural steroid injection (N/A, 12/23/2020); Joint replacement (Bilateral); Robot-assisted subtotal hysterectomy (N/A, 11/9/2021); Robot-assisted laparoscopic abdominal sacrocolpopexy using da Lizet Xi (N/A, 11/9/2021); and Robot-assisted laparoscopic salpingo-oophorectomy using da Lizet Xi (Bilateral, 11/9/2021).    Mahsa's family history is not on file.    Mahsa reports that she has never smoked. She has never used smokeless tobacco. She reports previous alcohol use. She reports that she does not use drugs.   BRISA Bragg is allergic to adhesive, morphine, oxymetazoline hcl, sulfa (sulfonamide antibiotics), and xylometazoline hcl.   VAL Bragg has a current medication list which includes the following prescription(s): chlorhexidine, diclofenac sodium, estradiol, fluconazole, fluticasone propionate, gabapentin, levothyroxine, clonidine, hydroxyzine hcl, losartan, meloxicam, metronidazole, and ondansetron, and the following Facility-Administered Medications: lactated ringers.     Review of Systems   Constitutional: Negative for chills and fever.   Eyes: Negative for visual disturbance.   Respiratory: Negative for cough and shortness of breath.    Cardiovascular:  "Negative for chest pain.   Genitourinary: Positive for vaginal discharge.   Neurological: Negative for headaches.     Objective     /62 (BP Location: Right arm, Patient Position: Sitting, BP Method: Large (Manual))   Pulse 73   Temp 98.1 °F (36.7 °C) (Temporal)   Resp 14   Ht 5' 4" (1.626 m)   Wt 88.4 kg (194 lb 12.8 oz)   SpO2 99%   BMI 33.44 kg/m²     Physical Exam  Vitals and nursing note reviewed.   Constitutional:       General: She is not in acute distress.     Appearance: Normal appearance. She is well-developed. She is not diaphoretic.   HENT:      Head: Normocephalic and atraumatic.      Right Ear: External ear normal.      Left Ear: External ear normal.   Eyes:      General:         Right eye: No discharge.         Left eye: No discharge.   Cardiovascular:      Rate and Rhythm: Normal rate and regular rhythm.      Heart sounds: Normal heart sounds.   Pulmonary:      Effort: Pulmonary effort is normal.      Breath sounds: Normal breath sounds. No wheezing or rales.   Skin:     General: Skin is warm and dry.   Neurological:      Mental Status: She is alert and oriented to person, place, and time. Mental status is at baseline.   Psychiatric:         Mood and Affect: Mood normal.         Behavior: Behavior normal.         Thought Content: Thought content normal.         Judgment: Judgment normal.        Assessment/Plan     1. Essential hypertension  losartan (COZAAR) 50 MG tablet    cloNIDine (CATAPRES) 0.1 MG tablet   2. Bacterial vaginosis  metroNIDAZOLE (METROGEL) 0.75 % vaginal gel   3. Anxiety  hydrOXYzine HCL (ATARAX) 25 MG tablet     Went over patient's lab work.  Will increase losartan to 50 mg daily.  Metrogel for bacterial vaginosis.  Refilled hydroxyzine for anxiety.  Can use clonidine as needed for elevated blood pressures above 160 systolic.  Follow-up in 3 months.    Future Appointments   Date Time Provider Department Center   4/11/2022 12:45 PM Gordon Cedillo MD [id number] " [place]   7/5/2022  8:20 AM Braden Newby MD [facility]         Braden Newby MD  Family Medicine  [facility]

## 2022-04-29 NOTE — TELEPHONE ENCOUNTER
----- Message from Tahira Charles sent at 8/6/2020  8:02 AM CDT -----  Patient is calling to let you know that the medication on her list are wrong.  She would like to speak to someone to correct it.  Please call her at [phone].  Thank you!    
Spoke to the patient. She was looking at an old list of medications. We went over her medications while she was on the phone. Medications are correct.   
denies pain/discomfort
Yes

## 2022-05-11 PROCEDURE — G0179 MD RECERTIFICATION HHA PT: HCPCS | Mod: ,,, | Performed by: FAMILY MEDICINE

## 2022-05-11 PROCEDURE — G0179 PR HOME HEALTH MD RECERTIFICATION: ICD-10-PCS | Mod: ,,, | Performed by: FAMILY MEDICINE

## 2022-05-20 ENCOUNTER — PATIENT OUTREACH (OUTPATIENT)
Dept: HOME HEALTH SERVICES | Facility: HOSPITAL | Age: 87
End: 2022-05-20
Payer: MEDICARE

## 2022-06-14 ENCOUNTER — EXTERNAL HOME HEALTH (OUTPATIENT)
Dept: HOME HEALTH SERVICES | Facility: HOSPITAL | Age: 87
End: 2022-06-14
Payer: MEDICARE

## 2022-07-05 ENCOUNTER — OFFICE VISIT (OUTPATIENT)
Dept: FAMILY MEDICINE | Facility: CLINIC | Age: 87
End: 2022-07-05
Payer: MEDICARE

## 2022-07-05 VITALS
RESPIRATION RATE: 16 BRPM | OXYGEN SATURATION: 99 % | BODY MASS INDEX: 33.6 KG/M2 | DIASTOLIC BLOOD PRESSURE: 62 MMHG | WEIGHT: 196.81 LBS | SYSTOLIC BLOOD PRESSURE: 124 MMHG | HEIGHT: 64 IN | TEMPERATURE: 98 F | HEART RATE: 79 BPM

## 2022-07-05 DIAGNOSIS — M25.552 PAIN OF LEFT HIP JOINT: Primary | ICD-10-CM

## 2022-07-05 PROCEDURE — 99999 PR PBB SHADOW E&M-EST. PATIENT-LVL IV: ICD-10-PCS | Mod: PBBFAC,,, | Performed by: FAMILY MEDICINE

## 2022-07-05 PROCEDURE — 99999 PR PBB SHADOW E&M-EST. PATIENT-LVL IV: CPT | Mod: PBBFAC,,, | Performed by: FAMILY MEDICINE

## 2022-07-05 PROCEDURE — 99214 PR OFFICE/OUTPT VISIT, EST, LEVL IV, 30-39 MIN: ICD-10-PCS | Mod: S$PBB,,, | Performed by: FAMILY MEDICINE

## 2022-07-05 PROCEDURE — 99214 OFFICE O/P EST MOD 30 MIN: CPT | Mod: PBBFAC | Performed by: FAMILY MEDICINE

## 2022-07-05 PROCEDURE — 99214 OFFICE O/P EST MOD 30 MIN: CPT | Mod: S$PBB,,, | Performed by: FAMILY MEDICINE

## 2022-07-05 RX ORDER — NABUMETONE 500 MG/1
500 TABLET, FILM COATED ORAL 2 TIMES DAILY PRN
Qty: 60 TABLET | Refills: 1 | Status: SHIPPED | OUTPATIENT
Start: 2022-07-05 | End: 2022-08-11 | Stop reason: SDUPTHER

## 2022-07-05 NOTE — PROGRESS NOTES
[facility] - Clinic Note    Subjective      Ms. Pedroza is a 87 y.o. female who presents to clinic for Follow-up (3mth follow up requesting refills)    Pain medication has been causing constipation so she has stopped this.  She is having pain in her hip.  The meloxicam was not helping.    [facility] Mahsa has a past medical history of Arthritis, Cataracts, bilateral, Osteopenia, and Thyroid disease.   PSX Mahsa has a past surgical history that includes Eye surgery; lamenectomy; Total replacement of both hip joints using computer-assisted navigation; Gallbladder surgery; Dilation and curettage of uterus; Hysteroscopy with dilation and curettage of uterus (N/A, 5/27/2019); Hysteroscopic surgical procedure (N/A, 5/27/2019); Cholecystectomy; Epidural steroid injection (N/A, 12/23/2020); Joint replacement (Bilateral); Robot-assisted subtotal hysterectomy (N/A, 11/9/2021); Robot-assisted laparoscopic abdominal sacrocolpopexy using da Lizet Xi (N/A, 11/9/2021); Robot-assisted laparoscopic salpingo-oophorectomy using da Lizet Xi (Bilateral, 11/9/2021); and Hysterectomy.    Mahsa's family history is not on file.   DANII Bragg reports that she has never smoked. She has never used smokeless tobacco. She reports previous alcohol use. She reports that she does not use drugs.   BRISA Bragg is allergic to adhesive, morphine, oxymetazoline hcl, sulfa (sulfonamide antibiotics), and xylometazoline hcl.   VAL Bragg has a current medication list which includes the following prescription(s): chlorhexidine, diclofenac sodium, docusate sodium, estradiol, fluticasone propionate, gabapentin, hydroxyzine hcl, up4 probiotics adult, levothyroxine, losartan, fibercon, clonidine, and nabumetone, and the following Facility-Administered Medications: lactated ringers.     Review of Systems   Constitutional: Negative for chills and fever.   Respiratory: Negative for shortness of breath.    Cardiovascular: Negative for chest pain.  "  Musculoskeletal: Positive for arthralgias.     Objective     /62 (BP Location: Left arm, Patient Position: Sitting, BP Method: Large (Manual))   Pulse 79   Temp 98.2 °F (36.8 °C) (Temporal)   Resp 16   Ht 5' 4" (1.626 m)   Wt 89.3 kg (196 lb 12.8 oz)   SpO2 99%   BMI 33.78 kg/m²     Physical Exam  Vitals and nursing note reviewed.   Constitutional:       General: She is not in acute distress.     Appearance: Normal appearance. She is well-developed. She is not diaphoretic.      Comments: Ambulating with walker   HENT:      Head: Normocephalic and atraumatic.      Right Ear: External ear normal.      Left Ear: External ear normal.   Eyes:      General:         Right eye: No discharge.         Left eye: No discharge.   Cardiovascular:      Rate and Rhythm: Normal rate and regular rhythm.      Heart sounds: Normal heart sounds.   Pulmonary:      Effort: Pulmonary effort is normal.      Breath sounds: Normal breath sounds. No wheezing or rales.   Skin:     General: Skin is warm and dry.   Neurological:      Mental Status: She is alert and oriented to person, place, and time. Mental status is at baseline.   Psychiatric:         Mood and Affect: Mood normal.         Behavior: Behavior normal.         Thought Content: Thought content normal.         Judgment: Judgment normal.        Assessment/Plan     1. Pain of left hip joint  nabumetone (RELAFEN) 500 MG tablet     History of arthritis.  Will prescribe nabumetone to use for arthritis.  Continue other medications as prescribed.  Follow-up in 2 months.    Future Appointments   Date Time Provider Department Center   9/7/2022 11:20 AM Braden Newby MD [facility]         Braden Newby MD  Family Medicine  [facility]            "

## 2022-07-25 ENCOUNTER — NURSE TRIAGE (OUTPATIENT)
Dept: ADMINISTRATIVE | Facility: CLINIC | Age: 87
End: 2022-07-25
Payer: MEDICARE

## 2022-07-25 NOTE — TELEPHONE ENCOUNTER
Called x2- no answer. On second VM advised to call OOC if still need to speak to a nurse. Also advised if having medical emergency to proceed to nearest ED. No contact.     Reason for Disposition   Message left on unidentified voice mail. Phone number verified.    Protocols used: NO CONTACT OR DUPLICATE CONTACT CALL-A-OH

## 2022-08-07 ENCOUNTER — HOSPITAL ENCOUNTER (EMERGENCY)
Facility: HOSPITAL | Age: 87
Discharge: HOME | End: 2022-08-07
Attending: INTERNAL MEDICINE
Payer: MEDICARE

## 2022-08-07 VITALS
DIASTOLIC BLOOD PRESSURE: 54 MMHG | RESPIRATION RATE: 11 BRPM | TEMPERATURE: 99 F | BODY MASS INDEX: 34.15 KG/M2 | HEART RATE: 75 BPM | OXYGEN SATURATION: 99 % | WEIGHT: 200 LBS | HEIGHT: 64 IN | SYSTOLIC BLOOD PRESSURE: 142 MMHG

## 2022-08-07 DIAGNOSIS — R00.2 PALPITATIONS: Primary | ICD-10-CM

## 2022-08-07 DIAGNOSIS — I10 HYPERTENSION: ICD-10-CM

## 2022-08-07 DIAGNOSIS — R20.0 NUMBNESS: ICD-10-CM

## 2022-08-07 LAB
ANION GAP SERPL CALC-SCNC: 14 MMOL/L (ref 8–16)
BASOPHILS # BLD AUTO: 0.03 K/UL (ref 0–0.2)
BASOPHILS NFR BLD: 0.4 % (ref 0–1.9)
BILIRUB UR QL STRIP: NEGATIVE
BUN SERPL-MCNC: 15 MG/DL (ref 8–23)
CALCIUM SERPL-MCNC: 8.5 MG/DL (ref 8.7–10.5)
CHLORIDE SERPL-SCNC: 96 MMOL/L (ref 95–110)
CLARITY UR: CLEAR
CO2 SERPL-SCNC: 20 MMOL/L (ref 23–29)
COLOR UR: YELLOW
CREAT SERPL-MCNC: 0.7 MG/DL (ref 0.5–1.4)
DIFFERENTIAL METHOD: ABNORMAL
EOSINOPHIL # BLD AUTO: 0.1 K/UL (ref 0–0.5)
EOSINOPHIL NFR BLD: 1.6 % (ref 0–8)
ERYTHROCYTE [DISTWIDTH] IN BLOOD BY AUTOMATED COUNT: 12.8 % (ref 11.5–14.5)
EST. GFR  (NO RACE VARIABLE): >60 ML/MIN/1.73 M^2
GLUCOSE SERPL-MCNC: 107 MG/DL (ref 70–110)
GLUCOSE UR QL STRIP: NEGATIVE
HCT VFR BLD AUTO: 34.5 % (ref 37–48.5)
HGB BLD-MCNC: 11.9 G/DL (ref 12–16)
HGB UR QL STRIP: ABNORMAL
IMM GRANULOCYTES # BLD AUTO: 0.04 K/UL (ref 0–0.04)
IMM GRANULOCYTES NFR BLD AUTO: 0.6 % (ref 0–0.5)
KETONES UR QL STRIP: ABNORMAL
LEUKOCYTE ESTERASE UR QL STRIP: NEGATIVE
LYMPHOCYTES # BLD AUTO: 1.5 K/UL (ref 1–4.8)
LYMPHOCYTES NFR BLD: 21.8 % (ref 18–48)
MCH RBC QN AUTO: 29.6 PG (ref 27–31)
MCHC RBC AUTO-ENTMCNC: 34.5 G/DL (ref 32–36)
MCV RBC AUTO: 86 FL (ref 82–98)
MONOCYTES # BLD AUTO: 0.7 K/UL (ref 0.3–1)
MONOCYTES NFR BLD: 9.4 % (ref 4–15)
NEUTROPHILS # BLD AUTO: 4.6 K/UL (ref 1.8–7.7)
NEUTROPHILS NFR BLD: 66.2 % (ref 38–73)
NITRITE UR QL STRIP: NEGATIVE
NRBC BLD-RTO: 0 /100 WBC
PH UR STRIP: 7 [PH] (ref 5–8)
PLATELET # BLD AUTO: 194 K/UL (ref 150–450)
PMV BLD AUTO: 9.9 FL (ref 9.2–12.9)
POTASSIUM SERPL-SCNC: 4.1 MMOL/L (ref 3.5–5.1)
PROT UR QL STRIP: NEGATIVE
RBC # BLD AUTO: 4.02 M/UL (ref 4–5.4)
SODIUM SERPL-SCNC: 130 MMOL/L (ref 136–145)
SP GR UR STRIP: 1.01 (ref 1–1.03)
TROPONIN I SERPL DL<=0.01 NG/ML-MCNC: 0.01 NG/ML (ref 0–0.03)
URN SPEC COLLECT METH UR: ABNORMAL
UROBILINOGEN UR STRIP-ACNC: NEGATIVE EU/DL
WBC # BLD AUTO: 6.92 K/UL (ref 3.9–12.7)

## 2022-08-07 PROCEDURE — 80048 BASIC METABOLIC PNL TOTAL CA: CPT | Performed by: INTERNAL MEDICINE

## 2022-08-07 PROCEDURE — 99285 EMERGENCY DEPT VISIT HI MDM: CPT | Mod: 25

## 2022-08-07 PROCEDURE — 71045 XR CHEST AP PORTABLE: ICD-10-PCS | Mod: 26,,, | Performed by: RADIOLOGY

## 2022-08-07 PROCEDURE — 70450 CT HEAD WITHOUT CONTRAST: ICD-10-PCS | Mod: 26,,, | Performed by: RADIOLOGY

## 2022-08-07 PROCEDURE — 71045 X-RAY EXAM CHEST 1 VIEW: CPT | Mod: TC

## 2022-08-07 PROCEDURE — 84484 ASSAY OF TROPONIN QUANT: CPT | Performed by: INTERNAL MEDICINE

## 2022-08-07 PROCEDURE — 81003 URINALYSIS AUTO W/O SCOPE: CPT | Performed by: INTERNAL MEDICINE

## 2022-08-07 PROCEDURE — 93010 EKG 12-LEAD: ICD-10-PCS | Mod: ,,, | Performed by: INTERNAL MEDICINE

## 2022-08-07 PROCEDURE — 93005 ELECTROCARDIOGRAM TRACING: CPT

## 2022-08-07 PROCEDURE — 71045 X-RAY EXAM CHEST 1 VIEW: CPT | Mod: 26,,, | Performed by: RADIOLOGY

## 2022-08-07 PROCEDURE — 70450 CT HEAD/BRAIN W/O DYE: CPT | Mod: TC

## 2022-08-07 PROCEDURE — 85025 COMPLETE CBC W/AUTO DIFF WBC: CPT | Performed by: INTERNAL MEDICINE

## 2022-08-07 PROCEDURE — 70450 CT HEAD/BRAIN W/O DYE: CPT | Mod: 26,,, | Performed by: RADIOLOGY

## 2022-08-07 PROCEDURE — 93010 ELECTROCARDIOGRAM REPORT: CPT | Mod: ,,, | Performed by: INTERNAL MEDICINE

## 2022-08-07 NOTE — ED PROVIDER NOTES
Encounter Date: 8/7/2022       History     Chief Complaint   Patient presents with    Anxiety     Patient states that her left shoulder has been bothering her for a couple of weeks but this morning it was hurting her very bad. Patient states she believes this set off her anxiety and was having trouble breathing and tingling in her hands and feet.  Patient states she took her blood pressure and anxiety medication prior to coming to hospital.     Patient comes in complaining of elevated blood pressure anxiety and left-sided numbness for several weeks.  They got worse this morning.  There is no paralysis paresthesias incontinence urine or bowel.  Denies any chest pain or shortness of breath fever chills.  She states that the pressure been better for the last several months until this morning.  Said became elevated this morning but no headache photophobia vision abnormalities.  She states that she calms down she gets better, she feels better at present.        Review of patient's allergies indicates:   Allergen Reactions    Adhesive Rash    Morphine     Oxymetazoline hcl Rash    Sulfa (sulfonamide antibiotics) Nausea And Vomiting    Xylometazoline hcl      Past Medical History:   Diagnosis Date    Arthritis     Cataracts, bilateral     Osteopenia     Thyroid disease      Past Surgical History:   Procedure Laterality Date    CHOLECYSTECTOMY      DILATION AND CURETTAGE OF UTERUS      EPIDURAL STEROID INJECTION N/A 12/23/2020    Procedure: RACHAEL Caudal;  Surgeon: Jim Ugalde MD;  Location: [facility] OR;  Service: Pain Management;  Laterality: N/A;    EYE SURGERY      GALLBLADDER SURGERY      HYSTERECTOMY      HYSTEROSCOPIC SURGICAL PROCEDURE N/A 5/27/2019    Procedure: HYSTEROSCOPY, THERAPEUTIC;  Surgeon: Gordon Cedillo MD;  Location: [facility] OR;  Service: OB/GYN;  Laterality: N/A;  mysoure    HYSTEROSCOPY WITH DILATION AND CURETTAGE OF UTERUS N/A 5/27/2019    Procedure: HYSTEROSCOPY, WITH DILATION AND  CURETTAGE OF UTERUS;  Surgeon: Gordon Cedillo MD;  Location: [facility] OR;  Service: OB/GYN;  Laterality: N/A;    JOINT REPLACEMENT Bilateral     Hips    lamenectomy      ROBOT-ASSISTED LAPAROSCOPIC ABDOMINAL SACROCOLPOPEXY USING DA DANETTE XI N/A 11/9/2021    Procedure: XI ROBOTIC SACROCOLPOPEXY, ABDOMINAL;  Surgeon: Reza Napoles MD;  Location: [facility] OR;  Service: OB/GYN;  Laterality: N/A;    ROBOT-ASSISTED LAPAROSCOPIC SALPINGO-OOPHORECTOMY USING DA DANETTE XI Bilateral 11/9/2021    Procedure: XI ROBOTIC SALPINGO-OOPHORECTOMY;  Surgeon: Reza Napoles MD;  Location: [facility] OR;  Service: OB/GYN;  Laterality: Bilateral;    ROBOT-ASSISTED SUBTOTAL HYSTERECTOMY N/A 11/9/2021    Procedure: ROBOTIC HYSTERECTOMY, SUPRACERVICAL;  Surgeon: Reza Napoles MD;  Location: [facility] OR;  Service: OB/GYN;  Laterality: N/A;    TOTAL REPLACEMENT OF BOTH HIP JOINTS USING COMPUTER-ASSISTED NAVIGATION       History reviewed. No pertinent family history.  Social History     Tobacco Use    Smoking status: Never Smoker    Smokeless tobacco: Never Used   Substance Use Topics    Alcohol use: Not Currently    Drug use: Never     Review of Systems   Constitutional: Negative for fever.   HENT: Negative for sore throat.    Respiratory: Negative for shortness of breath.    Cardiovascular: Negative for chest pain.   Gastrointestinal: Negative for nausea.   Genitourinary: Negative for dysuria.   Musculoskeletal: Negative for back pain.   Skin: Negative for rash.   Neurological: Negative for weakness.   Hematological: Does not bruise/bleed easily.       Physical Exam     Initial Vitals [08/07/22 1228]   BP Pulse Resp Temp SpO2   (!) 171/85 92 18 98.6 °F (37 °C) 100 %      MAP       --         Physical Exam    Vitals reviewed.  Constitutional: She appears well-developed.   Eyes: Pupils are equal, round, and reactive to light.   Neck:   Normal range of motion.  Cardiovascular: Normal rate.   Pulmonary/Chest: Breath sounds normal.    Abdominal: Abdomen is soft.   Musculoskeletal:         General: Normal range of motion.      Cervical back: Normal range of motion.     Neurological: She is alert. She has normal strength and normal reflexes. No cranial nerve deficit or sensory deficit.   Skin: Skin is warm.   Psychiatric: She has a normal mood and affect.         ED Course   Procedures  Labs Reviewed   BASIC METABOLIC PANEL - Abnormal; Notable for the following components:       Result Value    Sodium 130 (*)     CO2 20 (*)     Calcium 8.5 (*)     All other components within normal limits   CBC W/ AUTO DIFFERENTIAL - Abnormal; Notable for the following components:    Hemoglobin 11.9 (*)     Hematocrit 34.5 (*)     Immature Granulocytes 0.6 (*)     All other components within normal limits   URINALYSIS, REFLEX TO URINE CULTURE - Abnormal; Notable for the following components:    Ketones, UA 1+ (*)     Occult Blood UA Trace (*)     All other components within normal limits    Narrative:     Preferred Collection Type->Urine, Clean Catch  Specimen Source->Urine   TROPONIN I        ECG Results          EKG 12-lead (Preliminary result)  Result time 08/07/22 13:13:57    ED Interpretation by Adolfo Del Rosario MD (08/07/22 13:13:57, [facility] Dept, Emergency Medicine)    First-degree AV block with a rate of 88 and no acute ischemic changes                            Imaging Results          CT Head Without Contrast (Final result)  Result time 08/07/22 13:10:14    Final result by Sheeba Byrd MD (08/07/22 13:10:14)                 Impression:      No acute intracranial abnormality.      Electronically signed by: Sheeba Byrd  Date:    08/07/2022  Time:    13:10             Narrative:    EXAMINATION:  CT HEAD WITHOUT CONTRAST    CLINICAL HISTORY:  Numbness;    TECHNIQUE:  Low dose axial CT images obtained throughout the head without intravenous contrast. Sagittal and coronal reconstructions were performed.    COMPARISON:  None.    FINDINGS:  The  soft tissues are unremarkable.  Mastoid air cells and paranasal sinuses are predominantly clear.  No acute calvarial abnormality.    No intracranial hemorrhage or extra-axial fluid collection.  No hydrocephalus.  No evidence of intracranial mass or mass effect.  No acute large territory infarct.                               X-Ray Chest AP Portable (Final result)  Result time 08/07/22 13:05:56    Final result by Sheeba Byrd MD (08/07/22 13:05:56)                 Impression:      No acute cardiopulmonary abnormality.      Electronically signed by: Sheeba Byrd  Date:    08/07/2022  Time:    13:05             Narrative:    EXAMINATION:  XR CHEST AP PORTABLE    CLINICAL HISTORY:  Hypertension;    TECHNIQUE:  Single view of the chest was obtained.    COMPARISON:  Multiple priors, most recent 03/04/2022    FINDINGS:  Unchanged cardiomediastinal contour with atherosclerotic calcification of the aortic arch.  Probable bibasilar atelectasis.  No focal consolidation, pleural effusion or pneumothorax.                                 Medications - No data to display  Medical Decision Making:   ED Management:  Patient states she feels much better she thinks she was stressed out this morning from getting up at 2:00 a.m. this morning cleaning a kitchen.  He is asymptomatic at present.             ED Course as of 08/07/22 1423   Sun Aug 07, 2022   1313 CT Head Without Contrast [PW]   1313 X-Ray Chest AP Portable [PW]   1313 EKG 12-lead [PW]   1319 Patient blood pressure 136/80, asymptomatic at present. [PW]   1350 Complete Blood Count (CBC)(!) [PW]   1402 Urinalysis, Reflex to Urine Culture Urine, Clean Catch(!) [PW]   1411 Basic Metabolic Panel (BMP)(!) [PW]   1420 Troponin I: 0.006 [PW]      ED Course User Index  [PW] Adolfo Del Rosario MD             Clinical Impression:   Final diagnoses:  [I10] Hypertension  [R00.2] Palpitations (Primary)  [R20.0] Numbness          ED Disposition Condition    Discharge Stable        ED  Prescriptions     None        Follow-up Information     Follow up With Specialties Details Why Contact Info    Braden Newby MD Family Medicine In 2 days  [address] [facility] [place] [address] [phone]             Adolfo Del Rosario MD  08/07/22 7947

## 2022-08-11 ENCOUNTER — OFFICE VISIT (OUTPATIENT)
Dept: FAMILY MEDICINE | Facility: CLINIC | Age: 87
End: 2022-08-11
Payer: MEDICARE

## 2022-08-11 VITALS
HEIGHT: 64 IN | BODY MASS INDEX: 32.89 KG/M2 | TEMPERATURE: 98 F | WEIGHT: 192.63 LBS | HEART RATE: 74 BPM | DIASTOLIC BLOOD PRESSURE: 64 MMHG | RESPIRATION RATE: 14 BRPM | OXYGEN SATURATION: 99 % | SYSTOLIC BLOOD PRESSURE: 136 MMHG

## 2022-08-11 DIAGNOSIS — M25.552 PAIN OF LEFT HIP JOINT: ICD-10-CM

## 2022-08-11 DIAGNOSIS — F41.9 ANXIETY: Primary | ICD-10-CM

## 2022-08-11 PROCEDURE — 99214 OFFICE O/P EST MOD 30 MIN: CPT | Mod: 25,PBBFAC | Performed by: FAMILY MEDICINE

## 2022-08-11 PROCEDURE — 99214 OFFICE O/P EST MOD 30 MIN: CPT | Mod: S$PBB,,, | Performed by: FAMILY MEDICINE

## 2022-08-11 PROCEDURE — 99214 PR OFFICE/OUTPT VISIT, EST, LEVL IV, 30-39 MIN: ICD-10-PCS | Mod: S$PBB,,, | Performed by: FAMILY MEDICINE

## 2022-08-11 PROCEDURE — 99999 PR PBB SHADOW E&M-EST. PATIENT-LVL IV: ICD-10-PCS | Mod: PBBFAC,,, | Performed by: FAMILY MEDICINE

## 2022-08-11 PROCEDURE — 96372 THER/PROPH/DIAG INJ SC/IM: CPT | Mod: PBBFAC

## 2022-08-11 PROCEDURE — 99999 PR PBB SHADOW E&M-EST. PATIENT-LVL IV: CPT | Mod: PBBFAC,,, | Performed by: FAMILY MEDICINE

## 2022-08-11 RX ORDER — DEXAMETHASONE SODIUM PHOSPHATE 4 MG/ML
4 INJECTION, SOLUTION INTRA-ARTICULAR; INTRALESIONAL; INTRAMUSCULAR; INTRAVENOUS; SOFT TISSUE
Status: COMPLETED | OUTPATIENT
Start: 2022-08-11 | End: 2022-08-11

## 2022-08-11 RX ORDER — NABUMETONE 500 MG/1
500 TABLET, FILM COATED ORAL 2 TIMES DAILY PRN
Qty: 60 TABLET | Refills: 1 | Status: SHIPPED | OUTPATIENT
Start: 2022-08-11 | End: 2022-11-21

## 2022-08-11 RX ORDER — BUSPIRONE HYDROCHLORIDE 5 MG/1
5 TABLET ORAL 2 TIMES DAILY
Qty: 60 TABLET | Refills: 11 | Status: SHIPPED | OUTPATIENT
Start: 2022-08-11 | End: 2022-10-19

## 2022-08-11 RX ADMIN — DEXAMETHASONE SODIUM PHOSPHATE 4 MG: 4 INJECTION INTRA-ARTICULAR; INTRALESIONAL; INTRAMUSCULAR; INTRAVENOUS; SOFT TISSUE at 11:08

## 2022-08-11 NOTE — PROGRESS NOTES
Mahsa EMMANUEL Pedroza arrive to clinic awake and alert.  Pt verified name and .   Allergies reviewed with patient.  Pt given DECADRON via Intramuscular Right upper quad. gluteus per provider orders.    Well tolerated by patient.  denies further needs.    Patient instructed to wait 15 mins after injection.   Patient states no vaccines in the last 14 days.  Vaccine information sheet was given to patient. Patient voiced understanding.    Kaushal Torres LPN

## 2022-08-11 NOTE — PROGRESS NOTES
[facility] - Clinic Note    Subjective      Ms. Pedroza is a 87 y.o. female who presents to clinic for Arm Pain (L arm)    Had a panic attack.  Was seen in the ER.  Panic attack resolved.    [facility] Mahsa has a past medical history of Arthritis, Cataracts, bilateral, Osteopenia, and Thyroid disease.   PSXH Mahsa has a past surgical history that includes Eye surgery; lamenectomy; Total replacement of both hip joints using computer-assisted navigation; Gallbladder surgery; Dilation and curettage of uterus; Hysteroscopy with dilation and curettage of uterus (N/A, 5/27/2019); Hysteroscopic surgical procedure (N/A, 5/27/2019); Cholecystectomy; Epidural steroid injection (N/A, 12/23/2020); Joint replacement (Bilateral); Robot-assisted subtotal hysterectomy (N/A, 11/9/2021); Robot-assisted laparoscopic abdominal sacrocolpopexy using da Lizet Xi (N/A, 11/9/2021); Robot-assisted laparoscopic salpingo-oophorectomy using da Lizet Xi (Bilateral, 11/9/2021); and Hysterectomy.    Mahsa's family history is not on file.   DANII Bragg reports that she has never smoked. She has never used smokeless tobacco. She reports previous alcohol use. She reports that she does not use drugs.   BRISA Bragg is allergic to adhesive, morphine, oxymetazoline hcl, sulfa (sulfonamide antibiotics), and xylometazoline hcl.   VAL Bragg has a current medication list which includes the following prescription(s): chlorhexidine, clonidine, diclofenac sodium, docusate sodium, estradiol, fluticasone propionate, gabapentin, hydroxyzine hcl, up4 probiotics adult, levothyroxine, losartan, fibercon, buspirone, and nabumetone, and the following Facility-Administered Medications: lactated ringers.     Review of Systems   Constitutional: Negative for chills and fever.   Respiratory: Negative for shortness of breath.    Cardiovascular: Negative for chest pain.   Musculoskeletal: Positive for arthralgias.     Objective     /64 (BP Location: Right arm,  "Patient Position: Sitting, BP Method: Large (Manual))   Pulse 74   Temp 97.8 °F (36.6 °C) (Temporal)   Resp 14   Ht 5' 4" (1.626 m)   Wt 87.4 kg (192 lb 9.6 oz)   SpO2 99%   BMI 33.06 kg/m²     Physical Exam  Vitals and nursing note reviewed.   Constitutional:       General: She is not in acute distress.     Appearance: Normal appearance. She is well-developed. She is not diaphoretic.      Comments: Ambulating with walker   HENT:      Head: Normocephalic and atraumatic.      Right Ear: External ear normal.      Left Ear: External ear normal.   Eyes:      General:         Right eye: No discharge.         Left eye: No discharge.   Cardiovascular:      Rate and Rhythm: Normal rate and regular rhythm.      Heart sounds: Normal heart sounds.   Pulmonary:      Effort: Pulmonary effort is normal.      Breath sounds: Normal breath sounds. No wheezing or rales.   Skin:     General: Skin is warm and dry.   Neurological:      Mental Status: She is alert and oriented to person, place, and time. Mental status is at baseline.   Psychiatric:         Mood and Affect: Mood normal.         Behavior: Behavior normal.         Thought Content: Thought content normal.         Judgment: Judgment normal.        Assessment/Plan     1. Anxiety  busPIRone (BUSPAR) 5 MG Tab   2. Pain of left hip joint  nabumetone (RELAFEN) 500 MG tablet    dexamethasone injection 4 mg     Reviewed ER records.  Will start buspirone to help with anxiety.  Continue nabumetone.  Decadron injection today to help with arthralgias.    Future Appointments   Date Time Provider Department Center   11/21/2022  2:00 PM Braden Newby MD [facility]         Braden Newby MD  Family Medicine  [facility]              "

## 2022-08-17 ENCOUNTER — PATIENT MESSAGE (OUTPATIENT)
Dept: FAMILY MEDICINE | Facility: CLINIC | Age: 87
End: 2022-08-17
Payer: MEDICARE

## 2022-08-17 DIAGNOSIS — J40 BRONCHITIS: Primary | ICD-10-CM

## 2022-08-17 RX ORDER — AZITHROMYCIN 250 MG/1
TABLET, FILM COATED ORAL
Qty: 6 TABLET | Refills: 0 | Status: SHIPPED | OUTPATIENT
Start: 2022-08-17 | End: 2022-08-22

## 2022-09-11 ENCOUNTER — PATIENT MESSAGE (OUTPATIENT)
Dept: FAMILY MEDICINE | Facility: CLINIC | Age: 87
End: 2022-09-11
Payer: MEDICARE

## 2022-09-12 RX ORDER — LEVOTHYROXINE SODIUM 50 UG/1
50 TABLET ORAL
Qty: 90 TABLET | Refills: 3 | Status: SHIPPED | OUTPATIENT
Start: 2022-09-12 | End: 2023-09-12

## 2022-09-14 ENCOUNTER — NURSE TRIAGE (OUTPATIENT)
Dept: ADMINISTRATIVE | Facility: CLINIC | Age: 87
End: 2022-09-14
Payer: MEDICARE

## 2022-09-14 NOTE — TELEPHONE ENCOUNTER
"Having severe cramps all the way up and down legs up to groin. Pt is requesting to be seen tomorrow but no available appts are noted. Please call the patient with an appt time for tomorrow 9/15/22.  Reason for Disposition   [1] MODERATE pain (e.g., interferes with normal activities, limping) AND [2] present > 3 days    Additional Information   Negative: Looks like a broken bone or dislocated joint (e.g., crooked or deformed)   Negative: Sounds like a life-threatening emergency to the triager   Negative: Followed a leg injury   Negative: Leg swelling is main symptom   Negative: Back pain radiating (shooting) into leg(s)   Negative: Knee pain is main symptom   Negative: Ankle pain is main symptom   Negative: Pregnant   Negative: Chest pain   Negative: Difficulty breathing   Negative: Entire foot is cool or blue in comparison to other side   Negative: Unable to walk   Negative: [1] Red area or streak AND [2] fever   Negative: [1] Swollen joint AND [2] fever   Negative: [1] Cast on leg or ankle AND [2] now increased pain   Negative: Patient sounds very sick or weak to the triager   Negative: [1] SEVERE pain (e.g., excruciating, unable to do any normal activities) AND [2] not improved after 2 hours of pain medicine   Negative: [1] Thigh or calf pain AND [2] only 1 side AND [3] present > 1 hour   Negative: [1] Thigh, calf, or ankle swelling AND [2] only 1 side   Negative: [1] Thigh, calf, or ankle swelling AND [2] bilateral AND [3] 1 side is more swollen   Negative: [1] Red area or streak AND [2] large (> 2 in. or 5 cm)   Negative: History of prior "blood clot" in leg or lungs (i.e., deep vein thrombosis, pulmonary embolism)   Negative: History of inherited increased risk of blood clots (e.g., Factor 5 Leiden, Anti-thrombin 3, Protein C or Protein S deficiency, Prothrombin mutation)   Negative: Recent illness requiring prolonged bedrest (i.e., immobilization)   Negative: Hip or leg fracture in past 2 months (e.g, or had " "cast on leg or ankle)   Negative: Major surgery in the past two months   Negative: Cancer treatment in the past two months (or has cancer now)   Negative: Recent long-distance travel with prolonged time in car, bus, plane, or train (i.e., within past 2 weeks; 6 or  more hours duration)   Negative: [1] Painful rash AND [2] multiple small blisters grouped together (i.e., dermatomal distribution or "band" or "stripe")   Negative: Looks like a boil, infected sore, deep ulcer or other infected rash (spreading redness, pus)   Negative: [1] Localized rash is very painful AND [2] no fever   Negative: Numbness in a leg or foot (i.e., loss of sensation)   Negative: Localized pain, redness or hard lump along vein    Protocols used: Leg Pain-A-AH    "

## 2022-09-15 ENCOUNTER — TELEPHONE (OUTPATIENT)
Dept: FAMILY MEDICINE | Facility: CLINIC | Age: 87
End: 2022-09-15
Payer: MEDICARE

## 2022-09-15 NOTE — TELEPHONE ENCOUNTER
----- Message from Ling Pinedo sent at 9/15/2022  8:31 AM CDT -----  .Type:  Same Day Appointment Request     Caller is requesting a same day appointment can't sleep in pain       Caller declined first available appointment none with your office [place] 9/16/2022 she wants to be seen today       Best Call Back Number: [phone]    Additional Information: none

## 2022-09-16 ENCOUNTER — OFFICE VISIT (OUTPATIENT)
Dept: FAMILY MEDICINE | Facility: CLINIC | Age: 87
End: 2022-09-16
Payer: MEDICARE

## 2022-09-16 ENCOUNTER — LAB VISIT (OUTPATIENT)
Dept: LAB | Facility: HOSPITAL | Age: 87
End: 2022-09-16
Attending: FAMILY MEDICINE
Payer: MEDICARE

## 2022-09-16 ENCOUNTER — TELEPHONE (OUTPATIENT)
Dept: FAMILY MEDICINE | Facility: CLINIC | Age: 87
End: 2022-09-16
Payer: MEDICARE

## 2022-09-16 VITALS
HEIGHT: 65 IN | RESPIRATION RATE: 16 BRPM | OXYGEN SATURATION: 98 % | WEIGHT: 194.19 LBS | HEART RATE: 73 BPM | SYSTOLIC BLOOD PRESSURE: 124 MMHG | BODY MASS INDEX: 32.35 KG/M2 | DIASTOLIC BLOOD PRESSURE: 62 MMHG

## 2022-09-16 DIAGNOSIS — M51.36 DEGENERATIVE DISC DISEASE, LUMBAR: Primary | ICD-10-CM

## 2022-09-16 DIAGNOSIS — E87.1 HYPONATREMIA: ICD-10-CM

## 2022-09-16 DIAGNOSIS — M25.552 BILATERAL HIP PAIN: ICD-10-CM

## 2022-09-16 DIAGNOSIS — M25.551 BILATERAL HIP PAIN: ICD-10-CM

## 2022-09-16 DIAGNOSIS — M48.061 SPINAL STENOSIS OF LUMBAR REGION, UNSPECIFIED WHETHER NEUROGENIC CLAUDICATION PRESENT: ICD-10-CM

## 2022-09-16 LAB
ALBUMIN SERPL BCP-MCNC: 4.3 G/DL (ref 3.5–5.2)
ALP SERPL-CCNC: 62 U/L (ref 55–135)
ALT SERPL W/O P-5'-P-CCNC: 20 U/L (ref 10–44)
ANION GAP SERPL CALC-SCNC: 12 MMOL/L (ref 8–16)
AST SERPL-CCNC: 23 U/L (ref 10–40)
BILIRUB SERPL-MCNC: 0.7 MG/DL (ref 0.1–1)
BUN SERPL-MCNC: 13 MG/DL (ref 8–23)
CALCIUM SERPL-MCNC: 9.3 MG/DL (ref 8.7–10.5)
CHLORIDE SERPL-SCNC: 97 MMOL/L (ref 95–110)
CO2 SERPL-SCNC: 24 MMOL/L (ref 23–29)
CREAT SERPL-MCNC: 0.8 MG/DL (ref 0.5–1.4)
EST. GFR  (NO RACE VARIABLE): >60 ML/MIN/1.73 M^2
GLUCOSE SERPL-MCNC: 93 MG/DL (ref 70–110)
OSMOLALITY SERPL: 279 MOSM/KG (ref 275–295)
POTASSIUM SERPL-SCNC: 4.9 MMOL/L (ref 3.5–5.1)
PROT SERPL-MCNC: 7.6 G/DL (ref 6–8.4)
SODIUM SERPL-SCNC: 133 MMOL/L (ref 136–145)

## 2022-09-16 PROCEDURE — 99999 PR PBB SHADOW E&M-EST. PATIENT-LVL V: ICD-10-PCS | Mod: PBBFAC,,, | Performed by: FAMILY MEDICINE

## 2022-09-16 PROCEDURE — 99999 PR PBB SHADOW E&M-EST. PATIENT-LVL V: CPT | Mod: PBBFAC,,, | Performed by: FAMILY MEDICINE

## 2022-09-16 PROCEDURE — 99214 OFFICE O/P EST MOD 30 MIN: CPT | Mod: S$PBB,,, | Performed by: FAMILY MEDICINE

## 2022-09-16 PROCEDURE — 99214 PR OFFICE/OUTPT VISIT, EST, LEVL IV, 30-39 MIN: ICD-10-PCS | Mod: S$PBB,,, | Performed by: FAMILY MEDICINE

## 2022-09-16 PROCEDURE — 36415 COLL VENOUS BLD VENIPUNCTURE: CPT | Performed by: FAMILY MEDICINE

## 2022-09-16 PROCEDURE — 99215 OFFICE O/P EST HI 40 MIN: CPT | Mod: PBBFAC | Performed by: FAMILY MEDICINE

## 2022-09-16 PROCEDURE — 83930 ASSAY OF BLOOD OSMOLALITY: CPT | Performed by: FAMILY MEDICINE

## 2022-09-16 PROCEDURE — 80053 COMPREHEN METABOLIC PANEL: CPT | Performed by: FAMILY MEDICINE

## 2022-09-16 RX ORDER — HYDROCODONE BITARTRATE AND ACETAMINOPHEN 5; 325 MG/1; MG/1
1 TABLET ORAL EVERY 12 HOURS PRN
Qty: 14 TABLET | Refills: 0 | Status: SHIPPED | OUTPATIENT
Start: 2022-09-16 | End: 2022-09-21 | Stop reason: SDUPTHER

## 2022-09-16 NOTE — TELEPHONE ENCOUNTER
Spoke with daughter in law, informed that the appointment is the only available appt. Verbalized understanding.

## 2022-09-16 NOTE — TELEPHONE ENCOUNTER
----- Message from Collin Villarreal sent at 9/16/2022  9:16 AM CDT -----  Contact: pt's daughter in law Janee at [phone]  Type: Needs Medical Advice  Who Called:  pt's daughter in law Janee  Best Call Back Number: [phone]  Additional Information: pt's daughter in law Janee is calling the office to see if her mother in law can be seen earlier than 4 pm this afternoon. Please call back and advise.

## 2022-09-16 NOTE — PROGRESS NOTES
Subjective:       Patient ID: Mahsa Pedroza is a 87 y.o. female.    Chief Complaint: Leg Pain (Bilateral: pain for past week)    HPI  Patient reports bilateral leg pain and cramps at night. Associated symptoms include low back pain (chronic but worsening) and bilateral hip pain (chronic but worsening). History is significant for hip replacements and epidural steroid injections. Not currently seeing ortho or pain mgt. Review of records reveals intermittent hyponatremia.    Review of Systems   Constitutional:  Negative for chills and fever.   Musculoskeletal:  Positive for arthralgias, back pain and myalgias.   Neurological:  Negative for seizures and syncope.     Past Medical History:   Diagnosis Date    Arthritis     Cataracts, bilateral     Osteopenia     Thyroid disease      Past Surgical History:   Procedure Laterality Date    CHOLECYSTECTOMY      DILATION AND CURETTAGE OF UTERUS      EPIDURAL STEROID INJECTION N/A 12/23/2020    Procedure: RACHAEL Caudal;  Surgeon: Jim Ugalde MD;  Location: [facility] OR;  Service: Pain Management;  Laterality: N/A;    EYE SURGERY      GALLBLADDER SURGERY      HYSTERECTOMY      HYSTEROSCOPIC SURGICAL PROCEDURE N/A 5/27/2019    Procedure: HYSTEROSCOPY, THERAPEUTIC;  Surgeon: Gordon Cedillo MD;  Location: [facility] OR;  Service: OB/GYN;  Laterality: N/A;  mysoure    HYSTEROSCOPY WITH DILATION AND CURETTAGE OF UTERUS N/A 5/27/2019    Procedure: HYSTEROSCOPY, WITH DILATION AND CURETTAGE OF UTERUS;  Surgeon: Gordon Cedillo MD;  Location: [facility] OR;  Service: OB/GYN;  Laterality: N/A;    JOINT REPLACEMENT Bilateral     Hips    lamenectomy      ROBOT-ASSISTED LAPAROSCOPIC ABDOMINAL SACROCOLPOPEXY USING DA DANETTE XI N/A 11/9/2021    Procedure: XI ROBOTIC SACROCOLPOPEXY, ABDOMINAL;  Surgeon: Reza Napoles MD;  Location: [facility] OR;  Service: OB/GYN;  Laterality: N/A;    ROBOT-ASSISTED LAPAROSCOPIC SALPINGO-OOPHORECTOMY USING DA DANETTE XI Bilateral 11/9/2021    Procedure: XI ROBOTIC  "SALPINGO-OOPHORECTOMY;  Surgeon: Reza Napoles MD;  Location: [facility] OR;  Service: OB/GYN;  Laterality: Bilateral;    ROBOT-ASSISTED SUBTOTAL HYSTERECTOMY N/A 11/9/2021    Procedure: ROBOTIC HYSTERECTOMY, SUPRACERVICAL;  Surgeon: Reza Napoles MD;  Location: [facility] OR;  Service: OB/GYN;  Laterality: N/A;    TOTAL REPLACEMENT OF BOTH HIP JOINTS USING COMPUTER-ASSISTED NAVIGATION       Social History     Socioeconomic History    Marital status:    Tobacco Use    Smoking status: Never    Smokeless tobacco: Never   Substance and Sexual Activity    Alcohol use: Not Currently    Drug use: Never    Sexual activity: Not Currently     Birth control/protection: See Surgical Hx     History reviewed. No pertinent family history.    Objective:      /62 (BP Location: Left arm, Patient Position: Sitting, BP Method: Large (Manual))   Pulse 73   Resp 16   Ht 5' 5" (1.651 m)   Wt 88.1 kg (194 lb 3.2 oz)   SpO2 98%   BMI 32.32 kg/m²   Physical Exam  Vitals reviewed.   Constitutional:       General: She is not in acute distress.     Appearance: She is obese. She is not toxic-appearing.   HENT:      Head: Normocephalic and atraumatic.   Cardiovascular:      Rate and Rhythm: Normal rate.   Pulmonary:      Effort: Pulmonary effort is normal. No respiratory distress.   Neurological:      Mental Status: She is alert and oriented to person, place, and time.   Psychiatric:         Mood and Affect: Mood normal.         Behavior: Behavior normal.       Assessment:       1. Degenerative disc disease, lumbar    2. Spinal stenosis of lumbar region, unspecified whether neurogenic claudication present    3. Bilateral hip pain    4. Hyponatremia        Plan:       Work up hyponatremia, refer to ortho and pain mgt. Short term supply of norco prescribed; patient informed this may be short-term.    Degenerative disc disease, lumbar  -     Ambulatory referral/consult to Pain Clinic; Future; Expected date: 09/23/2022  -     " HYDROcodone-acetaminophen (NORCO) 5-325 mg per tablet; Take 1 tablet by mouth every 12 (twelve) hours as needed for Pain.  Dispense: 14 tablet; Refill: 0    Spinal stenosis of lumbar region, unspecified whether neurogenic claudication present  -     Ambulatory referral/consult to Pain Clinic; Future; Expected date: 09/23/2022  -     HYDROcodone-acetaminophen (NORCO) 5-325 mg per tablet; Take 1 tablet by mouth every 12 (twelve) hours as needed for Pain.  Dispense: 14 tablet; Refill: 0    Bilateral hip pain  -     Ambulatory referral/consult to Orthopedics; Future; Expected date: 09/23/2022  -     HYDROcodone-acetaminophen (NORCO) 5-325 mg per tablet; Take 1 tablet by mouth every 12 (twelve) hours as needed for Pain.  Dispense: 14 tablet; Refill: 0    Hyponatremia  -     Comprehensive Metabolic Panel; Future; Expected date: 09/16/2022  -     Cancel: Osmolality, urine  -     Osmolality, Serum; Future; Expected date: 09/16/2022  -     Magnesium; Future; Expected date: 09/16/2022  -     Osmolality, urine; Future; Expected date: 09/16/2022          Risks, benefits, and side effects were discussed with the patient. All questions were answered to the fullest satisfaction of the patient, and pt verbalized understanding and agreement to treatment plan. Pt was to call with any new or worsening symptoms, or present to the ER.

## 2022-09-19 ENCOUNTER — PATIENT MESSAGE (OUTPATIENT)
Dept: FAMILY MEDICINE | Facility: CLINIC | Age: 87
End: 2022-09-19
Payer: MEDICARE

## 2022-09-19 DIAGNOSIS — M25.551 BILATERAL HIP PAIN: ICD-10-CM

## 2022-09-19 DIAGNOSIS — M51.36 DEGENERATIVE DISC DISEASE, LUMBAR: ICD-10-CM

## 2022-09-19 DIAGNOSIS — M48.061 SPINAL STENOSIS OF LUMBAR REGION, UNSPECIFIED WHETHER NEUROGENIC CLAUDICATION PRESENT: ICD-10-CM

## 2022-09-19 DIAGNOSIS — M25.552 BILATERAL HIP PAIN: ICD-10-CM

## 2022-09-21 RX ORDER — HYDROCODONE BITARTRATE AND ACETAMINOPHEN 5; 325 MG/1; MG/1
1 TABLET ORAL EVERY 12 HOURS PRN
Qty: 14 TABLET | Refills: 0 | Status: SHIPPED | OUTPATIENT
Start: 2022-09-21 | End: 2022-09-28

## 2022-09-24 ENCOUNTER — PATIENT MESSAGE (OUTPATIENT)
Dept: FAMILY MEDICINE | Facility: CLINIC | Age: 87
End: 2022-09-24
Payer: MEDICARE

## 2022-10-07 ENCOUNTER — NURSE TRIAGE (OUTPATIENT)
Dept: ADMINISTRATIVE | Facility: CLINIC | Age: 87
End: 2022-10-07
Payer: MEDICARE

## 2022-10-08 NOTE — TELEPHONE ENCOUNTER
"OOC incoming call - Pt c/o cramps d/t low na, high bp probably from eating more salt, took clonidine on top of normal bp meds as qday prn for systolic >160, Pt wants to know if she can take another clonidine. Pt instructed to follow doctors rx strictly and that it is not recommended that she just take another clonidine at this time. Educated Pt that she has to wait a full 24 hours to take another. Pt said she has been very sleepy all afternoon. HTN protocol followed and pt advised to call 911 now for hypertensive crisis and increased risk of stroke. Pt said she has to ask her son if it is ok for her to call 911 first because he doesn't like her to call 911. Educated Pt on need to call 911 now for it is considered a medical emergency and she is at a high risk for complications. Pt said "well, alright" after education. Encounter routed to PCP as high priority.   Reason for Disposition   Difficult to awaken or acting confused (e.g., disoriented, slurred speech)    Protocols used: High Blood Pressure-A-AH    "

## 2022-10-10 ENCOUNTER — PATIENT MESSAGE (OUTPATIENT)
Dept: FAMILY MEDICINE | Facility: CLINIC | Age: 87
End: 2022-10-10
Payer: MEDICARE

## 2022-10-18 ENCOUNTER — PATIENT MESSAGE (OUTPATIENT)
Dept: FAMILY MEDICINE | Facility: CLINIC | Age: 87
End: 2022-10-18
Payer: MEDICARE

## 2022-10-18 DIAGNOSIS — F41.9 ANXIETY: Primary | ICD-10-CM

## 2022-10-19 DIAGNOSIS — F41.9 ANXIETY: ICD-10-CM

## 2022-10-19 RX ORDER — HYDROXYZINE HYDROCHLORIDE 25 MG/1
25 TABLET, FILM COATED ORAL 3 TIMES DAILY PRN
Qty: 90 TABLET | Refills: 3 | Status: CANCELLED | OUTPATIENT
Start: 2022-10-19

## 2022-10-19 RX ORDER — HYDROXYZINE HYDROCHLORIDE 25 MG/1
25 TABLET, FILM COATED ORAL 3 TIMES DAILY PRN
Qty: 90 TABLET | Refills: 1 | Status: SHIPPED | OUTPATIENT
Start: 2022-10-19 | End: 2023-07-05 | Stop reason: SDUPTHER

## 2022-10-20 ENCOUNTER — PATIENT MESSAGE (OUTPATIENT)
Dept: FAMILY MEDICINE | Facility: CLINIC | Age: 87
End: 2022-10-20
Payer: MEDICARE

## 2022-11-21 ENCOUNTER — OFFICE VISIT (OUTPATIENT)
Dept: FAMILY MEDICINE | Facility: CLINIC | Age: 87
End: 2022-11-21
Payer: MEDICARE

## 2022-11-21 VITALS
HEART RATE: 79 BPM | BODY MASS INDEX: 31.49 KG/M2 | DIASTOLIC BLOOD PRESSURE: 68 MMHG | WEIGHT: 189 LBS | SYSTOLIC BLOOD PRESSURE: 118 MMHG | HEIGHT: 65 IN | RESPIRATION RATE: 16 BRPM | OXYGEN SATURATION: 98 %

## 2022-11-21 DIAGNOSIS — R25.2 MUSCLE CRAMPING: Primary | ICD-10-CM

## 2022-11-21 PROCEDURE — 99999 PR PBB SHADOW E&M-EST. PATIENT-LVL III: ICD-10-PCS | Mod: PBBFAC,,, | Performed by: FAMILY MEDICINE

## 2022-11-21 PROCEDURE — 99213 OFFICE O/P EST LOW 20 MIN: CPT | Mod: S$PBB,,, | Performed by: FAMILY MEDICINE

## 2022-11-21 PROCEDURE — 99213 OFFICE O/P EST LOW 20 MIN: CPT | Mod: PBBFAC | Performed by: FAMILY MEDICINE

## 2022-11-21 PROCEDURE — 99213 PR OFFICE/OUTPT VISIT, EST, LEVL III, 20-29 MIN: ICD-10-PCS | Mod: S$PBB,,, | Performed by: FAMILY MEDICINE

## 2022-11-21 PROCEDURE — 99999 PR PBB SHADOW E&M-EST. PATIENT-LVL III: CPT | Mod: PBBFAC,,, | Performed by: FAMILY MEDICINE

## 2022-11-21 NOTE — PROGRESS NOTES
[facility] - Clinic Note    Subjective      Ms. Pedroza is a 88 y.o. female who presents to clinic for Follow-up (Leg cramping)    Has been having muscle cramping.  Hip pain has resolved.    [facility] Mahsa has a past medical history of Arthritis, Cataracts, bilateral, Osteopenia, and Thyroid disease.   [facility] Mahsa has a past surgical history that includes Eye surgery; lamenectomy; Total replacement of both hip joints using computer-assisted navigation; Gallbladder surgery; Dilation and curettage of uterus; Hysteroscopy with dilation and curettage of uterus (N/A, 5/27/2019); Hysteroscopic surgical procedure (N/A, 5/27/2019); Cholecystectomy; Epidural steroid injection (N/A, 12/23/2020); Joint replacement (Bilateral); Robot-assisted subtotal hysterectomy (N/A, 11/9/2021); Robot-assisted laparoscopic abdominal sacrocolpopexy using da Lizet Xi (N/A, 11/9/2021); Robot-assisted laparoscopic salpingo-oophorectomy using da Lizet Xi (Bilateral, 11/9/2021); and Hysterectomy.    Mahsa's family history is not on file.    Mahsa reports that she has never smoked. She has never used smokeless tobacco. She reports that she does not currently use alcohol. She reports that she does not use drugs.   BRISA Bragg is allergic to adhesive, morphine, oxymetazoline hcl, sulfa (sulfonamide antibiotics), and xylometazoline hcl.   [facility] Mahsa has a current medication list which includes the following prescription(s): clonidine, diclofenac sodium, docusate sodium, estradiol, fluticasone propionate, hydroxyzine hcl, up4 probiotics adult, levothyroxine, losartan, and fibercon, and the following Facility-Administered Medications: lactated ringers.     Review of Systems   Constitutional: Negative for chills and fever.   Respiratory: Negative for shortness of breath.    Cardiovascular: Negative for chest pain.   Musculoskeletal: Positive for myalgias.     Objective     /68 (BP Location: Left arm, Patient Position: Sitting, BP  "Method: Medium (Manual))   Pulse 79   Resp 16   Ht 5' 5" (1.651 m)   Wt 85.7 kg (189 lb)   SpO2 98%   BMI 31.45 kg/m²     Physical Exam  Vitals and nursing note reviewed.   Constitutional:       General: She is not in acute distress.     Appearance: Normal appearance. She is well-developed. She is not diaphoretic.      Comments: Ambulating with walker   HENT:      Head: Normocephalic and atraumatic.      Right Ear: External ear normal.      Left Ear: External ear normal.   Eyes:      General:         Right eye: No discharge.         Left eye: No discharge.   Cardiovascular:      Rate and Rhythm: Normal rate and regular rhythm.      Heart sounds: Normal heart sounds.   Pulmonary:      Effort: Pulmonary effort is normal.      Breath sounds: Normal breath sounds. No wheezing or rales.   Skin:     General: Skin is warm and dry.   Neurological:      Mental Status: She is alert and oriented to person, place, and time. Mental status is at baseline.   Psychiatric:         Mood and Affect: Mood normal.         Behavior: Behavior normal.         Thought Content: Thought content normal.         Judgment: Judgment normal.        Assessment/Plan     1. Muscle cramping          recommended hylands leg cramp relief to help with muscle cramps.  Increase fluid intake.  Otherwise continue current medications as prescribed.    No future appointments.        Braden Newby MD  Family Medicine  [facility]                "

## 2022-11-27 ENCOUNTER — PATIENT MESSAGE (OUTPATIENT)
Dept: FAMILY MEDICINE | Facility: CLINIC | Age: 87
End: 2022-11-27
Payer: MEDICARE

## 2022-11-29 ENCOUNTER — PATIENT MESSAGE (OUTPATIENT)
Dept: FAMILY MEDICINE | Facility: CLINIC | Age: 87
End: 2022-11-29
Payer: MEDICARE

## 2022-11-29 DIAGNOSIS — M79.642 PAIN IN BOTH HANDS: Primary | ICD-10-CM

## 2022-11-29 DIAGNOSIS — M79.641 PAIN IN BOTH HANDS: Primary | ICD-10-CM

## 2022-11-29 DIAGNOSIS — M79.643 PAIN OF HAND, UNSPECIFIED LATERALITY: Primary | ICD-10-CM

## 2022-11-30 ENCOUNTER — HOSPITAL ENCOUNTER (OUTPATIENT)
Dept: RADIOLOGY | Facility: HOSPITAL | Age: 87
Discharge: HOME | End: 2022-11-30
Attending: ORTHOPAEDIC SURGERY
Payer: MEDICARE

## 2022-11-30 ENCOUNTER — TELEPHONE (OUTPATIENT)
Dept: ORTHOPEDICS | Facility: CLINIC | Age: 87
End: 2022-11-30
Payer: MEDICARE

## 2022-11-30 ENCOUNTER — PATIENT MESSAGE (OUTPATIENT)
Dept: FAMILY MEDICINE | Facility: CLINIC | Age: 87
End: 2022-11-30
Payer: MEDICARE

## 2022-11-30 DIAGNOSIS — M79.641 PAIN OF RIGHT HAND: ICD-10-CM

## 2022-11-30 DIAGNOSIS — M79.641 PAIN OF RIGHT HAND: Primary | ICD-10-CM

## 2022-12-08 ENCOUNTER — HOSPITAL ENCOUNTER (OUTPATIENT)
Dept: RADIOLOGY | Facility: HOSPITAL | Age: 87
Discharge: HOME | End: 2022-12-08
Attending: ORTHOPAEDIC SURGERY
Payer: MEDICARE

## 2022-12-08 ENCOUNTER — OFFICE VISIT (OUTPATIENT)
Dept: ORTHOPEDICS | Facility: CLINIC | Age: 87
End: 2022-12-08
Payer: MEDICARE

## 2022-12-08 VITALS — BODY MASS INDEX: 31.44 KG/M2 | WEIGHT: 188.94 LBS

## 2022-12-08 DIAGNOSIS — M79.643 PAIN OF HAND, UNSPECIFIED LATERALITY: ICD-10-CM

## 2022-12-08 DIAGNOSIS — M65.311 TRIGGER THUMB OF RIGHT HAND: ICD-10-CM

## 2022-12-08 PROCEDURE — 73130 X-RAY EXAM OF HAND: CPT | Mod: TC,PN,RT

## 2022-12-08 PROCEDURE — 99204 PR OFFICE/OUTPT VISIT, NEW, LEVL IV, 45-59 MIN: ICD-10-PCS | Mod: S$PBB,25,, | Performed by: ORTHOPAEDIC SURGERY

## 2022-12-08 PROCEDURE — 99999 PR PBB SHADOW E&M-EST. PATIENT-LVL III: ICD-10-PCS | Mod: PBBFAC,,, | Performed by: ORTHOPAEDIC SURGERY

## 2022-12-08 PROCEDURE — 73130 X-RAY EXAM OF HAND: CPT | Mod: 26,RT,, | Performed by: RADIOLOGY

## 2022-12-08 PROCEDURE — 73130 XR HAND COMPLETE 3 VIEW RIGHT: ICD-10-PCS | Mod: 26,RT,, | Performed by: RADIOLOGY

## 2022-12-08 PROCEDURE — 99204 OFFICE O/P NEW MOD 45 MIN: CPT | Mod: S$PBB,25,, | Performed by: ORTHOPAEDIC SURGERY

## 2022-12-08 PROCEDURE — 20550 TENDON SHEATH: ICD-10-PCS | Mod: S$PBB,RT,, | Performed by: ORTHOPAEDIC SURGERY

## 2022-12-08 PROCEDURE — 20550 NJX 1 TENDON SHEATH/LIGAMENT: CPT | Mod: PBBFAC,PN,RT | Performed by: ORTHOPAEDIC SURGERY

## 2022-12-08 PROCEDURE — 99213 OFFICE O/P EST LOW 20 MIN: CPT | Mod: PBBFAC,PN,25 | Performed by: ORTHOPAEDIC SURGERY

## 2022-12-08 PROCEDURE — 99999 PR PBB SHADOW E&M-EST. PATIENT-LVL III: CPT | Mod: PBBFAC,,, | Performed by: ORTHOPAEDIC SURGERY

## 2022-12-08 RX ORDER — TRIAMCINOLONE ACETONIDE 40 MG/ML
40 INJECTION, SUSPENSION INTRA-ARTICULAR; INTRAMUSCULAR
Status: DISCONTINUED | OUTPATIENT
Start: 2022-12-08 | End: 2022-12-08 | Stop reason: HOSPADM

## 2022-12-08 RX ADMIN — TRIAMCINOLONE ACETONIDE 40 MG: 40 INJECTION, SUSPENSION INTRA-ARTICULAR; INTRAMUSCULAR at 09:12

## 2022-12-08 NOTE — PROCEDURES
Tendon Sheath    Date/Time: 12/8/2022 9:15 AM  Performed by: Cesar Nj MD  Authorized by: Cesar Nj MD     Consent Done?:  Yes (Verbal)  Indications:  Pain  Site marked: the procedure site was marked    Timeout: prior to procedure the correct patient, procedure, and site was verified    Prep: patient was prepped and draped in usual sterile fashion      Local anesthesia used?: Yes    Anesthesia:  Local infiltration  Local anesthetic:  Lidocaine 1% without epinephrine  Anesthetic total (ml):  2    Location:  Thumb  Site:  R thumb flexor tendon sheath  Ultrasonic guidance for needle placement?: No    Needle size:  25 G  Approach:  Volar  Medications:  40 mg triamcinolone acetonide 40 mg/mL  Patient tolerance:  Patient tolerated the procedure well with no immediate complications

## 2022-12-08 NOTE — PROGRESS NOTES
Subjective:      Patient ID: Mahsa Pedroza is a 88 y.o. female.    Chief Complaint: Pain of the Right Hand    88-year-old female long history of intermittent problems with her right hand.  She states that she is had a long history of numbness and tingling was told that she needed surgery in the past.  Main complaint today is a locking and catching in her right thumb which is interfering with her activities of daily living.  Denies any trauma to her hand.  Other than tramadol and Tylenol she is had no treatment for this.  Was recently seen by her PCP and referred for further evaluation.    Pain  Associated symptoms include joint swelling.   Review of Systems   Musculoskeletal:  Positive for arthritis, back pain, joint pain, joint swelling and stiffness.   All other systems reviewed and are negative.      Objective:    Ortho Exam     Constitutional:   Patient is alert  and oriented in no acute distress  HEENT:  normocephalic atraumatic; PERRL EOMI  Neck:  Supple without adenopathy  Cardiovascular:  Normal rate and rhythm  Pulmonary:  Normal respiratory effort normal chest wall expansion  Abdominal:  Nonprotuberant nondistended  Musculoskeletal:  Patient has obvious significant triggering/locking of her right thumb.  She has a tender nodule at the base of her volar aspect of her right thumb.  Subjective decreased sensation over the radial digits.  Some thenar atrophy she has brisk capillary refill of her digits.  Intact flexor and extensor tendon function.  Positive carpal tunnel compression test  Neurological:  No focal defect; cranial nerves 2-12 grossly intact  Psychiatric/behavioral:  Mood and behavior normal      X-Ray Hand Complete Right  Narrative: EXAMINATION:  XR HAND COMPLETE 3 VIEW RIGHT    CLINICAL HISTORY:  Pain in right hand    TECHNIQUE:  PA, lateral, and oblique views of the right hand were performed.    COMPARISON:  None    FINDINGS:  There is mild degenerative osteoarthrosis involving the MCP  joints and the IP joints of the digits.  No marginal erosions.  No significant periarticular soft tissue swelling.    Carpal bones are intact with mild degenerative osteoarthrosis most predominant within the 1st CMC joint.  Distal radius and ulna intact.    Mild bone demineralization.  Impression: Mild chronic changes of degenerative osteoarthrosis.    Electronically signed by: Reagan Calabrese  Date:    12/08/2022  Time:    09:09       My Findings:    I have personally reviewed radiographs and concur with above findings    Assessment:       Encounter Diagnoses   Name Primary?    Pain of hand, unspecified laterality     Trigger thumb of right hand Yes         Plan:       I have discussed medical condition and treatment options with her at length.  I have discussed medical condition and treatment options with her at length.  After a verbal consent and sterile prep I injected her right thumb at the A1 pulley region without complication using accommodation with cc of Kenalog 2 cc of lidocaine.  We discussed gentle range-of-motion exercises follow up in 4-6 weeks.  If symptoms fail to improve could consider reinjection or recommend surgical release I will see her sooner if any questions or problems.  Additionally she likely has longstanding carpal tunnel syndrome but refuses at this point in the nerve conduction studies I have suggested night splinting.        Past Medical History:   Diagnosis Date    Arthritis     Cataracts, bilateral     Osteopenia     Thyroid disease      Past Surgical History:   Procedure Laterality Date    CHOLECYSTECTOMY      DILATION AND CURETTAGE OF UTERUS      EPIDURAL STEROID INJECTION N/A 12/23/2020    Procedure: RACHAEL Caudal;  Surgeon: Jim Ugalde MD;  Location: [facility];  Service: Pain Management;  Laterality: N/A;    EYE SURGERY      GALLBLADDER SURGERY      HYSTERECTOMY      HYSTEROSCOPIC SURGICAL PROCEDURE N/A 5/27/2019    Procedure: HYSTEROSCOPY, THERAPEUTIC;  Surgeon: Gordon THAKUR  MD Nguyen;  Location: [facility] OR;  Service: OB/GYN;  Laterality: N/A;  mysoure    HYSTEROSCOPY WITH DILATION AND CURETTAGE OF UTERUS N/A 5/27/2019    Procedure: HYSTEROSCOPY, WITH DILATION AND CURETTAGE OF UTERUS;  Surgeon: Gordon Cedillo MD;  Location: [facility] OR;  Service: OB/GYN;  Laterality: N/A;    JOINT REPLACEMENT Bilateral     Hips    lamenectomy      ROBOT-ASSISTED LAPAROSCOPIC ABDOMINAL SACROCOLPOPEXY USING DA DANETTE XI N/A 11/9/2021    Procedure: XI ROBOTIC SACROCOLPOPEXY, ABDOMINAL;  Surgeon: Reza Napoles MD;  Location: [facility] OR;  Service: OB/GYN;  Laterality: N/A;    ROBOT-ASSISTED LAPAROSCOPIC SALPINGO-OOPHORECTOMY USING DA DANETTE XI Bilateral 11/9/2021    Procedure: XI ROBOTIC SALPINGO-OOPHORECTOMY;  Surgeon: Reza Napoles MD;  Location: [facility] OR;  Service: OB/GYN;  Laterality: Bilateral;    ROBOT-ASSISTED SUBTOTAL HYSTERECTOMY N/A 11/9/2021    Procedure: ROBOTIC HYSTERECTOMY, SUPRACERVICAL;  Surgeon: Reza Napoles MD;  Location: [facility] OR;  Service: OB/GYN;  Laterality: N/A;    TOTAL REPLACEMENT OF BOTH HIP JOINTS USING COMPUTER-ASSISTED NAVIGATION           Current Outpatient Medications:     cloNIDine (CATAPRES) 0.1 MG tablet, Take 1 tablet (0.1 mg total) by mouth daily as needed (SBP >160, DBP >100)., Disp: 30 tablet, Rfl: 5    diclofenac sodium (VOLTAREN) 1 % Gel, Apply 2 g topically 4 (four) times daily as needed., Disp: 100 g, Rfl: 1    docusate sodium (COLACE) 100 MG capsule, Take 1 capsule (100 mg total) by mouth 2 (two) times daily., Disp: 60 capsule, Rfl: 1    estradioL (ESTRACE) 0.01 % (0.1 mg/gram) vaginal cream, USE 1 GRAM VAGINALLY EVERY NIGHT FOR 4 WEEKS THEN USE VAGINALLY 3 TIMES EVERY WEEK, Disp: 42.5 g, Rfl: 1    fluticasone propionate (FLOVENT DISKUS) 50 mcg/actuation DsDv, Inhale into the lungs. Controller, Disp: , Rfl:     hydrOXYzine HCL (ATARAX) 25 MG tablet, Take 1 tablet (25 mg total) by mouth 3 (three) times daily as needed for Anxiety., Disp: 90 tablet, Rfl: 1     L.acidophil-L.plantar-Bifido 7 (UP4 PROBIOTICS ADULT) 15 billion cell Cap, Take 1 tablet by mouth once daily., Disp: , Rfl:     levothyroxine (SYNTHROID) 50 MCG tablet, Take 1 tablet (50 mcg total) by mouth before breakfast., Disp: 90 tablet, Rfl: 3    losartan (COZAAR) 50 MG tablet, Take 1 tablet (50 mg total) by mouth once daily., Disp: 90 tablet, Rfl: 3    polycarbophil (FIBERCON) 625 mg tablet, Take 1 tablet (625 mg total) by mouth once daily., Disp: 200 tablet, Rfl: 2  No current facility-administered medications for this visit.    Facility-Administered Medications Ordered in Other Visits:     lactated ringers infusion, , Intravenous, Once PRN, Jim Ugalde MD    Review of patient's allergies indicates:   Allergen Reactions    Adhesive Rash    Morphine     Oxymetazoline hcl Rash    Sulfa (sulfonamide antibiotics) Nausea And Vomiting    Xylometazoline hcl        History reviewed. No pertinent family history.  Social History     Occupational History    Not on file   Tobacco Use    Smoking status: Never    Smokeless tobacco: Never   Substance and Sexual Activity    Alcohol use: Not Currently    Drug use: Never    Sexual activity: Not Currently     Birth control/protection: See Surgical Hx

## 2022-12-12 ENCOUNTER — NURSE TRIAGE (OUTPATIENT)
Dept: ADMINISTRATIVE | Facility: CLINIC | Age: 87
End: 2022-12-12
Payer: MEDICARE

## 2022-12-19 ENCOUNTER — PATIENT MESSAGE (OUTPATIENT)
Dept: FAMILY MEDICINE | Facility: CLINIC | Age: 87
End: 2022-12-19
Payer: MEDICARE

## 2022-12-29 ENCOUNTER — OFFICE VISIT (OUTPATIENT)
Dept: FAMILY MEDICINE | Facility: CLINIC | Age: 87
End: 2022-12-29
Payer: MEDICARE

## 2022-12-29 ENCOUNTER — PATIENT MESSAGE (OUTPATIENT)
Dept: FAMILY MEDICINE | Facility: CLINIC | Age: 87
End: 2022-12-29

## 2022-12-29 ENCOUNTER — LAB VISIT (OUTPATIENT)
Dept: LAB | Facility: HOSPITAL | Age: 87
End: 2022-12-29
Attending: FAMILY MEDICINE
Payer: MEDICARE

## 2022-12-29 VITALS
TEMPERATURE: 98 F | HEART RATE: 71 BPM | HEIGHT: 65 IN | DIASTOLIC BLOOD PRESSURE: 78 MMHG | BODY MASS INDEX: 30.76 KG/M2 | RESPIRATION RATE: 16 BRPM | WEIGHT: 184.63 LBS | OXYGEN SATURATION: 98 % | SYSTOLIC BLOOD PRESSURE: 136 MMHG

## 2022-12-29 DIAGNOSIS — E03.9 HYPOTHYROIDISM, UNSPECIFIED TYPE: ICD-10-CM

## 2022-12-29 DIAGNOSIS — Z00.00 ANNUAL PHYSICAL EXAM: Primary | ICD-10-CM

## 2022-12-29 DIAGNOSIS — M25.552 BILATERAL HIP PAIN: ICD-10-CM

## 2022-12-29 DIAGNOSIS — I70.0 AORTIC ATHEROSCLEROSIS: ICD-10-CM

## 2022-12-29 DIAGNOSIS — M25.551 BILATERAL HIP PAIN: ICD-10-CM

## 2022-12-29 DIAGNOSIS — I10 ESSENTIAL HYPERTENSION: ICD-10-CM

## 2022-12-29 DIAGNOSIS — I70.0 AORTIC ATHEROSCLEROSIS: Primary | ICD-10-CM

## 2022-12-29 LAB
ALBUMIN SERPL BCP-MCNC: 4.1 G/DL (ref 3.5–5.2)
ALP SERPL-CCNC: 56 U/L (ref 55–135)
ALT SERPL W/O P-5'-P-CCNC: 23 U/L (ref 10–44)
ANION GAP SERPL CALC-SCNC: 9 MMOL/L (ref 8–16)
AST SERPL-CCNC: 25 U/L (ref 10–40)
BASOPHILS # BLD AUTO: 0.03 K/UL (ref 0–0.2)
BASOPHILS NFR BLD: 0.4 % (ref 0–1.9)
BILIRUB SERPL-MCNC: 0.8 MG/DL (ref 0.1–1)
BUN SERPL-MCNC: 10 MG/DL (ref 8–23)
CALCIUM SERPL-MCNC: 9.3 MG/DL (ref 8.7–10.5)
CHLORIDE SERPL-SCNC: 97 MMOL/L (ref 95–110)
CHOLEST SERPL-MCNC: 237 MG/DL (ref 120–199)
CHOLEST/HDLC SERPL: 3.2 {RATIO} (ref 2–5)
CO2 SERPL-SCNC: 26 MMOL/L (ref 23–29)
CREAT SERPL-MCNC: 0.8 MG/DL (ref 0.5–1.4)
DIFFERENTIAL METHOD: ABNORMAL
EOSINOPHIL # BLD AUTO: 0.1 K/UL (ref 0–0.5)
EOSINOPHIL NFR BLD: 0.9 % (ref 0–8)
ERYTHROCYTE [DISTWIDTH] IN BLOOD BY AUTOMATED COUNT: 13 % (ref 11.5–14.5)
EST. GFR  (NO RACE VARIABLE): >60 ML/MIN/1.73 M^2
GLUCOSE SERPL-MCNC: 96 MG/DL (ref 70–110)
HCT VFR BLD AUTO: 39.6 % (ref 37–48.5)
HDLC SERPL-MCNC: 74 MG/DL (ref 40–75)
HDLC SERPL: 31.2 % (ref 20–50)
HGB BLD-MCNC: 13.3 G/DL (ref 12–16)
IMM GRANULOCYTES # BLD AUTO: 0.02 K/UL (ref 0–0.04)
IMM GRANULOCYTES NFR BLD AUTO: 0.3 % (ref 0–0.5)
LDLC SERPL CALC-MCNC: 148.2 MG/DL (ref 63–159)
LYMPHOCYTES # BLD AUTO: 2.3 K/UL (ref 1–4.8)
LYMPHOCYTES NFR BLD: 33.2 % (ref 18–48)
MCH RBC QN AUTO: 29.5 PG (ref 27–31)
MCHC RBC AUTO-ENTMCNC: 33.6 G/DL (ref 32–36)
MCV RBC AUTO: 88 FL (ref 82–98)
MONOCYTES # BLD AUTO: 0.6 K/UL (ref 0.3–1)
MONOCYTES NFR BLD: 9.3 % (ref 4–15)
NEUTROPHILS # BLD AUTO: 3.8 K/UL (ref 1.8–7.7)
NEUTROPHILS NFR BLD: 55.9 % (ref 38–73)
NONHDLC SERPL-MCNC: 163 MG/DL
NRBC BLD-RTO: 0 /100 WBC
PLATELET # BLD AUTO: 224 K/UL (ref 150–450)
PMV BLD AUTO: 8.9 FL (ref 9.2–12.9)
POTASSIUM SERPL-SCNC: 4.4 MMOL/L (ref 3.5–5.1)
PROT SERPL-MCNC: 7.3 G/DL (ref 6–8.4)
RBC # BLD AUTO: 4.51 M/UL (ref 4–5.4)
SODIUM SERPL-SCNC: 132 MMOL/L (ref 136–145)
TRIGL SERPL-MCNC: 74 MG/DL (ref 30–150)
TSH SERPL DL<=0.005 MIU/L-ACNC: 1.72 UIU/ML (ref 0.4–4)
WBC # BLD AUTO: 6.77 K/UL (ref 3.9–12.7)

## 2022-12-29 PROCEDURE — 99214 OFFICE O/P EST MOD 30 MIN: CPT | Mod: PBBFAC | Performed by: FAMILY MEDICINE

## 2022-12-29 PROCEDURE — 80053 COMPREHEN METABOLIC PANEL: CPT | Performed by: FAMILY MEDICINE

## 2022-12-29 PROCEDURE — 99999 PR PBB SHADOW E&M-EST. PATIENT-LVL IV: CPT | Mod: PBBFAC,,, | Performed by: FAMILY MEDICINE

## 2022-12-29 PROCEDURE — 84443 ASSAY THYROID STIM HORMONE: CPT | Performed by: FAMILY MEDICINE

## 2022-12-29 PROCEDURE — 80061 LIPID PANEL: CPT | Performed by: FAMILY MEDICINE

## 2022-12-29 PROCEDURE — 99397 PR PREVENTIVE VISIT,EST,65 & OVER: ICD-10-PCS | Mod: S$PBB,GZ,, | Performed by: FAMILY MEDICINE

## 2022-12-29 PROCEDURE — 36415 COLL VENOUS BLD VENIPUNCTURE: CPT | Performed by: FAMILY MEDICINE

## 2022-12-29 PROCEDURE — 99999 PR PBB SHADOW E&M-EST. PATIENT-LVL IV: ICD-10-PCS | Mod: PBBFAC,,, | Performed by: FAMILY MEDICINE

## 2022-12-29 PROCEDURE — 99397 PER PM REEVAL EST PAT 65+ YR: CPT | Mod: S$PBB,GZ,, | Performed by: FAMILY MEDICINE

## 2022-12-29 PROCEDURE — 85025 COMPLETE CBC W/AUTO DIFF WBC: CPT | Performed by: FAMILY MEDICINE

## 2022-12-29 RX ORDER — HYDROCODONE BITARTRATE AND ACETAMINOPHEN 5; 325 MG/1; MG/1
1 TABLET ORAL EVERY 12 HOURS PRN
Qty: 30 TABLET | Refills: 0 | Status: SHIPPED | OUTPATIENT
Start: 2022-12-29 | End: 2023-01-18 | Stop reason: SDUPTHER

## 2022-12-29 RX ORDER — LOSARTAN POTASSIUM 100 MG/1
100 TABLET ORAL DAILY
Qty: 90 TABLET | Refills: 3 | Status: SHIPPED | OUTPATIENT
Start: 2022-12-29 | End: 2023-03-28 | Stop reason: SDUPTHER

## 2022-12-29 NOTE — PROGRESS NOTES
[facility] - Clinic Note    Subjective      Ms. Pedroza is a 88 y.o. female who presents to clinic for Hypertension and Hip Pain    Patient is still having hip pain.  Blood pressure has been elevated.  Having some cold intolerance in constipation.    [facility] Mahsa has a past medical history of Arthritis, Cataracts, bilateral, Osteopenia, and Thyroid disease.   PSXH Mahsa has a past surgical history that includes Eye surgery; lamenectomy; Total replacement of both hip joints using computer-assisted navigation; Gallbladder surgery; Dilation and curettage of uterus; Hysteroscopy with dilation and curettage of uterus (N/A, 5/27/2019); Hysteroscopic surgical procedure (N/A, 5/27/2019); Cholecystectomy; Epidural steroid injection (N/A, 12/23/2020); Joint replacement (Bilateral); Robot-assisted subtotal hysterectomy (N/A, 11/9/2021); Robot-assisted laparoscopic abdominal sacrocolpopexy using da Lizet Xi (N/A, 11/9/2021); Robot-assisted laparoscopic salpingo-oophorectomy using da Lizet Xi (Bilateral, 11/9/2021); and Hysterectomy.    Mahsa's family history is not on file.    Mahsa reports that she has never smoked. She has never used smokeless tobacco. She reports that she does not currently use alcohol. She reports that she does not use drugs.   BRISA Bragg is allergic to adhesive, morphine, oxymetazoline hcl, sulfa (sulfonamide antibiotics), and xylometazoline hcl.   [facility] Mahsa has a current medication list which includes the following prescription(s): clonidine, diclofenac sodium, docusate sodium, estradiol, fluticasone propionate, hydroxyzine hcl, up4 probiotics adult, levothyroxine, fibercon, hydrocodone-acetaminophen, and losartan, and the following Facility-Administered Medications: lactated ringers.     Review of Systems   Constitutional: Negative for chills and fever.   Respiratory: Negative for shortness of breath.    Cardiovascular: Negative for chest pain.   Musculoskeletal: Positive for myalgias.  "    Objective     /78 (BP Location: Right arm, Patient Position: Sitting, BP Method: Large (Manual))   Pulse 71   Temp 97.9 °F (36.6 °C) (Temporal)   Resp 16   Ht 5' 5" (1.651 m)   Wt 83.7 kg (184 lb 9.6 oz)   SpO2 98%   BMI 30.72 kg/m²     Physical Exam  Vitals and nursing note reviewed.   Constitutional:       General: She is not in acute distress.     Appearance: Normal appearance. She is well-developed. She is not diaphoretic.      Comments: Ambulating with walker   HENT:      Head: Normocephalic and atraumatic.      Right Ear: External ear normal.      Left Ear: External ear normal.   Eyes:      General:         Right eye: No discharge.         Left eye: No discharge.   Cardiovascular:      Rate and Rhythm: Normal rate and regular rhythm.      Heart sounds: Normal heart sounds.   Pulmonary:      Effort: Pulmonary effort is normal.      Breath sounds: Normal breath sounds. No wheezing or rales.   Skin:     General: Skin is warm and dry.   Neurological:      Mental Status: She is alert and oriented to person, place, and time. Mental status is at baseline.   Psychiatric:         Mood and Affect: Mood normal.         Behavior: Behavior normal.         Thought Content: Thought content normal.         Judgment: Judgment normal.        Assessment/Plan     1. Annual physical exam        2. Essential hypertension  Lipid Panel    losartan (COZAAR) 100 MG tablet      3. Hypothyroidism, unspecified type  Lipid Panel    Comprehensive Metabolic Panel    CBC Auto Differential    TSH      4. Aortic atherosclerosis        5. Bilateral hip pain  HYDROcodone-acetaminophen (NORCO) 5-325 mg per tablet        Refilled [place].   reviewed.  No red flags.  Increase losartan to 100 mg daily.  Check labs as above.    Future Appointments   Date Time Provider Department Center   12/30/2022  9:45 AM Ros Hernandez PA-C OCC [id number] [place]   1/19/2023  9:15 AM Cesar Nj MD [facility] ORTHO [facility] C "   4/24/2023 10:00 AM Braden Newby MD [facility]           Braden Newby MD  Family Medicine  [facility]

## 2022-12-30 ENCOUNTER — PATIENT MESSAGE (OUTPATIENT)
Dept: FAMILY MEDICINE | Facility: CLINIC | Age: 87
End: 2022-12-30
Payer: MEDICARE

## 2023-01-01 ENCOUNTER — PATIENT MESSAGE (OUTPATIENT)
Dept: FAMILY MEDICINE | Facility: CLINIC | Age: 88
End: 2023-01-01
Payer: MEDICARE

## 2023-01-01 RX ORDER — ATORVASTATIN CALCIUM 20 MG/1
20 TABLET, FILM COATED ORAL DAILY
Qty: 90 TABLET | Refills: 3 | Status: SHIPPED | OUTPATIENT
Start: 2023-01-01 | End: 2023-11-07

## 2023-01-04 ENCOUNTER — NURSE TRIAGE (OUTPATIENT)
Dept: ADMINISTRATIVE | Facility: CLINIC | Age: 88
End: 2023-01-04
Payer: MEDICARE

## 2023-01-04 ENCOUNTER — TELEPHONE (OUTPATIENT)
Dept: CARDIOLOGY | Facility: CLINIC | Age: 88
End: 2023-01-04
Payer: MEDICARE

## 2023-01-04 ENCOUNTER — TELEPHONE (OUTPATIENT)
Dept: FAMILY MEDICINE | Facility: CLINIC | Age: 88
End: 2023-01-04
Payer: MEDICARE

## 2023-01-04 NOTE — TELEPHONE ENCOUNTER
Spoke with pt. Her daughter has covid and she wants to wait for her to get better before scheduling the referral.

## 2023-01-04 NOTE — TELEPHONE ENCOUNTER
----- Message from Radha Blairard sent at 1/4/2023  9:15 AM CST -----  Regarding: COVID EXPOSURE  Contact: Patient  Type: Patient Call Back         Who called: Patient         What is the request in detail: calling to get advice on covid exposure and Rx options; states she will be taking a coivd test and will call back           Best call back number: [phone]         Additional Information:   [id number] [place] [id number] AT [facility] OF [facility] [address] [address]  Phone: [phone] Fax: [phone]             Thank You

## 2023-01-05 ENCOUNTER — PATIENT MESSAGE (OUTPATIENT)
Dept: FAMILY MEDICINE | Facility: CLINIC | Age: 88
End: 2023-01-05
Payer: MEDICARE

## 2023-01-05 ENCOUNTER — NURSE TRIAGE (OUTPATIENT)
Dept: ADMINISTRATIVE | Facility: CLINIC | Age: 88
End: 2023-01-05
Payer: MEDICARE

## 2023-01-05 DIAGNOSIS — U07.1 COVID-19: Primary | ICD-10-CM

## 2023-01-05 NOTE — TELEPHONE ENCOUNTER
Spoke with pt. Pt informed RX was sent into [facility]. Pt advised to hold her atorvastatin while taking antiviral. Encouraged pt to report to the ER if she feels more SOB or worsening. Pt voiced understanding.

## 2023-01-05 NOTE — TELEPHONE ENCOUNTER
Caller states she tested positive for covid today. Caller states that she has congestion and body aches x 2 days. Caller states she has mild SOB at this time. Caller offered [facility] visit and refused. Caller told that she should be seen and evaluated in UC or ED within the next 4 hours and that a message will be sent to provider for further assistance.  Pt advised per protocol and verbalized understanding.   Reason for Disposition   MILD difficulty breathing (e.g., minimal/no SOB at rest, SOB with walking, pulse <100)    Additional Information   Negative: SEVERE difficulty breathing (e.g., struggling for each breath, speaks in single words)   Negative: Difficult to awaken or acting confused (e.g., disoriented, slurred speech)   Negative: Bluish (or gray) lips or face now   Negative: Shock suspected (e.g., cold/pale/clammy skin, too weak to stand, low BP, rapid pulse)   Negative: Sounds like a life-threatening emergency to the triager   Negative: SEVERE or constant chest pain or pressure  (Exception: Mild central chest pain, present only when coughing.)   Negative: MODERATE difficulty breathing (e.g., speaks in phrases, SOB even at rest, pulse 100-120)   Negative: [1] Headache AND [2] stiff neck (can't touch chin to chest)   Negative: Oxygen level (e.g., pulse oximetry) 90 percent or lower   Negative: Chest pain or pressure  (Exception: MILD central chest pain, present only when coughing)   Negative: Patient sounds very sick or weak to the triager    Protocols used: Coronavirus (COVID-19) Diagnosed or [id number]

## 2023-01-17 ENCOUNTER — PATIENT MESSAGE (OUTPATIENT)
Dept: FAMILY MEDICINE | Facility: CLINIC | Age: 88
End: 2023-01-17
Payer: MEDICARE

## 2023-01-17 DIAGNOSIS — U07.1 COVID: Primary | ICD-10-CM

## 2023-01-17 RX ORDER — METHYLPREDNISOLONE 4 MG/1
TABLET ORAL
Qty: 21 EACH | Refills: 0 | Status: SHIPPED | OUTPATIENT
Start: 2023-01-17 | End: 2023-02-07

## 2023-01-17 RX ORDER — AZITHROMYCIN 250 MG/1
TABLET, FILM COATED ORAL
Qty: 6 TABLET | Refills: 0 | Status: SHIPPED | OUTPATIENT
Start: 2023-01-17 | End: 2023-01-22

## 2023-02-10 ENCOUNTER — TELEPHONE (OUTPATIENT)
Dept: CARDIOLOGY | Facility: CLINIC | Age: 88
End: 2023-02-10
Payer: MEDICARE

## 2023-02-13 ENCOUNTER — TELEPHONE (OUTPATIENT)
Dept: CARDIOLOGY | Facility: CLINIC | Age: 88
End: 2023-02-13
Payer: MEDICARE

## 2023-02-13 NOTE — TELEPHONE ENCOUNTER
Spoke with pt about cardio referral. It has another doctor named. She said she does not want to deal with this right now. She has been dealing with COVID and lack of transportation. She will call when she is ready to see a cardiologist.

## 2023-03-10 ENCOUNTER — PATIENT MESSAGE (OUTPATIENT)
Dept: FAMILY MEDICINE | Facility: CLINIC | Age: 88
End: 2023-03-10
Payer: MEDICARE

## 2023-03-10 DIAGNOSIS — M25.552 BILATERAL HIP PAIN: ICD-10-CM

## 2023-03-10 DIAGNOSIS — M25.551 BILATERAL HIP PAIN: ICD-10-CM

## 2023-03-10 RX ORDER — HYDROCODONE BITARTRATE AND ACETAMINOPHEN 5; 325 MG/1; MG/1
1 TABLET ORAL EVERY 12 HOURS PRN
Qty: 30 TABLET | Refills: 0 | Status: SHIPPED | OUTPATIENT
Start: 2023-03-10 | End: 2023-03-29 | Stop reason: SDUPTHER

## 2023-03-25 ENCOUNTER — PATIENT MESSAGE (OUTPATIENT)
Dept: FAMILY MEDICINE | Facility: CLINIC | Age: 88
End: 2023-03-25
Payer: MEDICARE

## 2023-03-25 DIAGNOSIS — M25.552 BILATERAL HIP PAIN: ICD-10-CM

## 2023-03-25 DIAGNOSIS — M25.551 BILATERAL HIP PAIN: ICD-10-CM

## 2023-03-25 DIAGNOSIS — I10 ESSENTIAL HYPERTENSION: ICD-10-CM

## 2023-03-27 NOTE — TELEPHONE ENCOUNTER
From Patient: Could you call in a prescription for Losartan 50 mg tablets. I stopped having trouble with my BP so i never changed over to the 100 mg tabs. Im still doin well on the 50 mg Lorsatan.    BP is 121/56. And my pulse is 68

## 2023-03-28 RX ORDER — LOSARTAN POTASSIUM 50 MG/1
50 TABLET ORAL DAILY
Qty: 90 TABLET | Refills: 3 | Status: SHIPPED | OUTPATIENT
Start: 2023-03-28 | End: 2023-06-26 | Stop reason: SDUPTHER

## 2023-03-28 NOTE — TELEPHONE ENCOUNTER
Dear Dr. Rodriguez,     In the absence of Braden Newby MD, can you please address this patients concern or request?    Thank you,  Kelsie Jeffrey LPN

## 2023-03-29 RX ORDER — HYDROCODONE BITARTRATE AND ACETAMINOPHEN 5; 325 MG/1; MG/1
1 TABLET ORAL EVERY 12 HOURS PRN
Qty: 30 TABLET | Refills: 0 | Status: SHIPPED | OUTPATIENT
Start: 2023-03-29 | End: 2023-04-17 | Stop reason: SDUPTHER

## 2023-04-24 ENCOUNTER — OFFICE VISIT (OUTPATIENT)
Dept: FAMILY MEDICINE | Facility: CLINIC | Age: 88
End: 2023-04-24
Payer: MEDICARE

## 2023-04-24 VITALS
DIASTOLIC BLOOD PRESSURE: 68 MMHG | BODY MASS INDEX: 31.22 KG/M2 | RESPIRATION RATE: 16 BRPM | HEIGHT: 65 IN | SYSTOLIC BLOOD PRESSURE: 134 MMHG | HEART RATE: 67 BPM | TEMPERATURE: 98 F | WEIGHT: 187.38 LBS | OXYGEN SATURATION: 97 %

## 2023-04-24 DIAGNOSIS — M25.551 BILATERAL HIP PAIN: Primary | ICD-10-CM

## 2023-04-24 DIAGNOSIS — M25.552 BILATERAL HIP PAIN: Primary | ICD-10-CM

## 2023-04-24 DIAGNOSIS — I70.0 AORTIC ATHEROSCLEROSIS: ICD-10-CM

## 2023-04-24 PROCEDURE — 99999 PR PBB SHADOW E&M-EST. PATIENT-LVL IV: CPT | Mod: PBBFAC,,, | Performed by: FAMILY MEDICINE

## 2023-04-24 PROCEDURE — 99214 PR OFFICE/OUTPT VISIT, EST, LEVL IV, 30-39 MIN: ICD-10-PCS | Mod: S$PBB,,, | Performed by: FAMILY MEDICINE

## 2023-04-24 PROCEDURE — 99214 OFFICE O/P EST MOD 30 MIN: CPT | Mod: PBBFAC | Performed by: FAMILY MEDICINE

## 2023-04-24 PROCEDURE — 99214 OFFICE O/P EST MOD 30 MIN: CPT | Mod: S$PBB,,, | Performed by: FAMILY MEDICINE

## 2023-04-24 PROCEDURE — 99999 PR PBB SHADOW E&M-EST. PATIENT-LVL IV: ICD-10-PCS | Mod: PBBFAC,,, | Performed by: FAMILY MEDICINE

## 2023-04-24 RX ORDER — MELOXICAM 7.5 MG/1
7.5 TABLET ORAL DAILY
Qty: 30 TABLET | Refills: 1 | Status: SHIPPED | OUTPATIENT
Start: 2023-04-24 | End: 2023-06-22

## 2023-04-24 NOTE — PROGRESS NOTES
[facility] Note    Subjective      Ms. Pedroza is a 88 y.o. female who presents to clinic for Follow-up (3mth follow up)    Patient is still having hip pain.      [facility] Mahsa has a past medical history of Arthritis, Cataracts, bilateral, Osteopenia, and Thyroid disease.   [facility] Mahsa has a past surgical history that includes Eye surgery; lamenectomy; Total replacement of both hip joints using computer-assisted navigation; Gallbladder surgery; Dilation and curettage of uterus; Hysteroscopy with dilation and curettage of uterus (N/A, 5/27/2019); Hysteroscopic surgical procedure (N/A, 5/27/2019); Cholecystectomy; Epidural steroid injection (N/A, 12/23/2020); Joint replacement (Bilateral); Robot-assisted subtotal hysterectomy (N/A, 11/9/2021); Robot-assisted laparoscopic abdominal sacrocolpopexy using da Lizet Xi (N/A, 11/9/2021); Robot-assisted laparoscopic salpingo-oophorectomy using da Lizet Xi (Bilateral, 11/9/2021); and Hysterectomy.    Mahsa's family history is not on file.   DANII Bragg reports that she has never smoked. She has never used smokeless tobacco. She reports that she does not currently use alcohol. She reports that she does not use drugs.   BRISA Bragg is allergic to adhesive, morphine, oxymetazoline hcl, sulfa (sulfonamide antibiotics), and xylometazoline hcl.   [facility] Bragg has a current medication list which includes the following prescription(s): atorvastatin, diclofenac sodium, estradiol, fluticasone propionate, hydrocodone-acetaminophen, hydroxyzine hcl, up4 probiotics adult, levothyroxine, losartan, clonidine, and meloxicam, and the following Facility-Administered Medications: lactated ringers.     Review of Systems   Constitutional: Negative for chills and fever.   Respiratory: Negative for shortness of breath.    Cardiovascular: Negative for chest pain.   Musculoskeletal: Positive for myalgias.     Objective     /68 (BP Location: Left arm, Patient Position: Sitting, BP  "Method: Large (Manual))   Pulse 67   Temp 98.1 °F (36.7 °C) (Temporal)   Resp 16   Ht 5' 5" (1.651 m)   Wt 85 kg (187 lb 6.4 oz)   SpO2 97%   BMI 31.18 kg/m²     Physical Exam  Vitals and nursing note reviewed.   Constitutional:       General: She is not in acute distress.     Appearance: Normal appearance. She is well-developed. She is not diaphoretic.      Comments: Ambulating with walker   HENT:      Head: Normocephalic and atraumatic.      Right Ear: External ear normal.      Left Ear: External ear normal.   Eyes:      General:         Right eye: No discharge.         Left eye: No discharge.   Cardiovascular:      Rate and Rhythm: Normal rate and regular rhythm.      Heart sounds: Normal heart sounds.   Pulmonary:      Effort: Pulmonary effort is normal.      Breath sounds: Normal breath sounds. No wheezing or rales.   Skin:     General: Skin is warm and dry.   Neurological:      Mental Status: She is alert and oriented to person, place, and time. Mental status is at baseline.   Psychiatric:         Mood and Affect: Mood normal.         Behavior: Behavior normal.         Thought Content: Thought content normal.         Judgment: Judgment normal.        Assessment/Plan     1. Bilateral hip pain  meloxicam (MOBIC) 7.5 MG tablet      2. Aortic atherosclerosis          Start meloxicam.  Continue pain medication as needed as it improves patient's quality of life.  Follow-up in 3 months.    1. Bilateral hip pain  -     meloxicam (MOBIC) 7.5 MG tablet; Take 1 tablet (7.5 mg total) by mouth once daily.  Dispense: 30 tablet; Refill: 1    2. Aortic atherosclerosis  Assessment & Plan:  On lipitor, stable        Future Appointments   Date Time Provider Department Center   8/14/2023 10:20 AM Braden Newby MD [facility]           Braden Newby MD  Family Medicine  [facility]                    "

## 2023-04-27 NOTE — TELEPHONE ENCOUNTER
----- Message from Anyi Salas sent at 9/12/2022  8:46 AM CDT -----  Who Called: Daughter    What is the reqeust in detail: New Rx    levothyroxine (SYNTHROID) 50 MCG tablet 90 tablet 3 7/1/2021  No  Sig: TAKE 1 TABLET BEFORE BREAKFAST  Sent to pharmacy as: levothyroxine (SYNTHROID) 50 MCG tablet  Class: Normal    PHARMACY: [facility] #[id number] [place] [address] AT [facility] OF [address]  [address] [facility] [address]  Phone: [phone] Fax: [phone]    Can the clinic reply by [facility]? NO    Best Call Back Number: [phone]    Additional Information:       
lev  
none

## 2023-05-10 DIAGNOSIS — M25.552 BILATERAL HIP PAIN: ICD-10-CM

## 2023-05-10 DIAGNOSIS — M25.551 BILATERAL HIP PAIN: ICD-10-CM

## 2023-05-10 RX ORDER — HYDROCODONE BITARTRATE AND ACETAMINOPHEN 5; 325 MG/1; MG/1
1 TABLET ORAL EVERY 12 HOURS PRN
Qty: 30 TABLET | Refills: 0 | Status: SHIPPED | OUTPATIENT
Start: 2023-05-10 | End: 2023-05-30 | Stop reason: SDUPTHER

## 2023-05-10 NOTE — TELEPHONE ENCOUNTER
----- Message from Kathy Gomez sent at 5/10/2023  2:44 PM CDT -----  Contact: patient  Type:  RX Refill Request    Who Called: patient  Refill or New Rx:  refill  RX Name and Strength:  HYDROcodone-acetaminophen (NORCO) 5-325 mg per tablet  How is the patient currently taking it? (ex. 1XDay):  as directed  Is this a 30 day or 90 day RX:  30  Preferred Pharmacy with phone number:    [id number] - [place] [address] [address]  [address] [place] [id number]  Phone: [phone] Fax: [phone]  Local or Mail Order:  local  Ordering Provider:  marvin  Best Call Back Number:  [phone]   Additional Information:

## 2023-06-26 ENCOUNTER — PATIENT MESSAGE (OUTPATIENT)
Dept: FAMILY MEDICINE | Facility: CLINIC | Age: 88
End: 2023-06-26
Payer: MEDICARE

## 2023-06-26 DIAGNOSIS — M25.551 BILATERAL HIP PAIN: ICD-10-CM

## 2023-06-26 DIAGNOSIS — I10 ESSENTIAL HYPERTENSION: ICD-10-CM

## 2023-06-26 DIAGNOSIS — M25.552 BILATERAL HIP PAIN: ICD-10-CM

## 2023-06-27 RX ORDER — LOSARTAN POTASSIUM 50 MG/1
50 TABLET ORAL DAILY
Qty: 90 TABLET | Refills: 3 | Status: SHIPPED | OUTPATIENT
Start: 2023-06-27 | End: 2023-11-07

## 2023-06-28 ENCOUNTER — PATIENT MESSAGE (OUTPATIENT)
Dept: FAMILY MEDICINE | Facility: CLINIC | Age: 88
End: 2023-06-28
Payer: MEDICARE

## 2023-06-28 RX ORDER — HYDROCODONE BITARTRATE AND ACETAMINOPHEN 5; 325 MG/1; MG/1
1 TABLET ORAL EVERY 12 HOURS PRN
Qty: 30 TABLET | Refills: 0 | Status: SHIPPED | OUTPATIENT
Start: 2023-06-28 | End: 2023-07-21 | Stop reason: SDUPTHER

## 2023-07-02 ENCOUNTER — PATIENT MESSAGE (OUTPATIENT)
Dept: FAMILY MEDICINE | Facility: CLINIC | Age: 88
End: 2023-07-02
Payer: MEDICARE

## 2023-07-03 ENCOUNTER — PATIENT MESSAGE (OUTPATIENT)
Dept: FAMILY MEDICINE | Facility: CLINIC | Age: 88
End: 2023-07-03
Payer: MEDICARE

## 2023-07-04 ENCOUNTER — PATIENT MESSAGE (OUTPATIENT)
Dept: FAMILY MEDICINE | Facility: CLINIC | Age: 88
End: 2023-07-04
Payer: MEDICARE

## 2023-07-05 DIAGNOSIS — F41.9 ANXIETY: ICD-10-CM

## 2023-07-07 ENCOUNTER — PATIENT MESSAGE (OUTPATIENT)
Dept: FAMILY MEDICINE | Facility: CLINIC | Age: 88
End: 2023-07-07
Payer: MEDICARE

## 2023-07-07 DIAGNOSIS — I10 ESSENTIAL HYPERTENSION: Primary | ICD-10-CM

## 2023-07-07 RX ORDER — AMLODIPINE BESYLATE 5 MG/1
5 TABLET ORAL DAILY
Qty: 30 TABLET | Refills: 11 | Status: SHIPPED | OUTPATIENT
Start: 2023-07-07 | End: 2023-11-07

## 2023-07-08 ENCOUNTER — HOSPITAL ENCOUNTER (EMERGENCY)
Facility: HOSPITAL | Age: 88
Discharge: HOME | End: 2023-07-08
Attending: INTERNAL MEDICINE
Payer: MEDICARE

## 2023-07-08 VITALS
OXYGEN SATURATION: 100 % | WEIGHT: 180 LBS | DIASTOLIC BLOOD PRESSURE: 55 MMHG | TEMPERATURE: 98 F | HEART RATE: 66 BPM | HEIGHT: 63 IN | SYSTOLIC BLOOD PRESSURE: 126 MMHG | RESPIRATION RATE: 12 BRPM | BODY MASS INDEX: 31.89 KG/M2

## 2023-07-08 DIAGNOSIS — R07.9 CHEST PAIN: ICD-10-CM

## 2023-07-08 DIAGNOSIS — I16.0 HYPERTENSIVE URGENCY: Primary | ICD-10-CM

## 2023-07-08 LAB
ALBUMIN SERPL BCP-MCNC: 4 G/DL (ref 3.5–5.2)
ALP SERPL-CCNC: 55 U/L (ref 55–135)
ALT SERPL W/O P-5'-P-CCNC: 20 U/L (ref 10–44)
ANION GAP SERPL CALC-SCNC: 8 MMOL/L (ref 8–16)
AST SERPL-CCNC: 24 U/L (ref 10–40)
BASOPHILS # BLD AUTO: 0.02 K/UL (ref 0–0.2)
BASOPHILS NFR BLD: 0.3 % (ref 0–1.9)
BILIRUB SERPL-MCNC: 0.7 MG/DL (ref 0.1–1)
BILIRUB UR QL STRIP: NEGATIVE
BNP SERPL-MCNC: 70 PG/ML (ref 0–99)
BUN SERPL-MCNC: 14 MG/DL (ref 8–23)
CALCIUM SERPL-MCNC: 9.1 MG/DL (ref 8.7–10.5)
CHLORIDE SERPL-SCNC: 99 MMOL/L (ref 95–110)
CLARITY UR: CLEAR
CO2 SERPL-SCNC: 22 MMOL/L (ref 23–29)
COLOR UR: YELLOW
CREAT SERPL-MCNC: 0.8 MG/DL (ref 0.5–1.4)
D DIMER PPP IA.FEU-MCNC: 0.53 MG/L FEU
DIFFERENTIAL METHOD: ABNORMAL
EOSINOPHIL # BLD AUTO: 0.1 K/UL (ref 0–0.5)
EOSINOPHIL NFR BLD: 1.2 % (ref 0–8)
ERYTHROCYTE [DISTWIDTH] IN BLOOD BY AUTOMATED COUNT: 13.2 % (ref 11.5–14.5)
EST. GFR  (NO RACE VARIABLE): >60 ML/MIN/1.73 M^2
GLUCOSE SERPL-MCNC: 97 MG/DL (ref 70–110)
GLUCOSE UR QL STRIP: NEGATIVE
HCT VFR BLD AUTO: 36.2 % (ref 37–48.5)
HGB BLD-MCNC: 12.2 G/DL (ref 12–16)
HGB UR QL STRIP: NEGATIVE
IMM GRANULOCYTES # BLD AUTO: 0.03 K/UL (ref 0–0.04)
IMM GRANULOCYTES NFR BLD AUTO: 0.4 % (ref 0–0.5)
KETONES UR QL STRIP: NEGATIVE
LEUKOCYTE ESTERASE UR QL STRIP: NEGATIVE
LYMPHOCYTES # BLD AUTO: 2.4 K/UL (ref 1–4.8)
LYMPHOCYTES NFR BLD: 32.6 % (ref 18–48)
MCH RBC QN AUTO: 29.3 PG (ref 27–31)
MCHC RBC AUTO-ENTMCNC: 33.7 G/DL (ref 32–36)
MCV RBC AUTO: 87 FL (ref 82–98)
MONOCYTES # BLD AUTO: 0.7 K/UL (ref 0.3–1)
MONOCYTES NFR BLD: 9.7 % (ref 4–15)
NEUTROPHILS # BLD AUTO: 4.1 K/UL (ref 1.8–7.7)
NEUTROPHILS NFR BLD: 55.8 % (ref 38–73)
NITRITE UR QL STRIP: NEGATIVE
NRBC BLD-RTO: 0 /100 WBC
PH UR STRIP: 8 [PH] (ref 5–8)
PLATELET # BLD AUTO: 205 K/UL (ref 150–450)
PMV BLD AUTO: 9.4 FL (ref 9.2–12.9)
POTASSIUM SERPL-SCNC: 4.6 MMOL/L (ref 3.5–5.1)
PROT SERPL-MCNC: 6.8 G/DL (ref 6–8.4)
PROT UR QL STRIP: NEGATIVE
RBC # BLD AUTO: 4.16 M/UL (ref 4–5.4)
SODIUM SERPL-SCNC: 129 MMOL/L (ref 136–145)
SP GR UR STRIP: 1.01 (ref 1–1.03)
TROPONIN I SERPL DL<=0.01 NG/ML-MCNC: <0.006 NG/ML (ref 0–0.03)
URN SPEC COLLECT METH UR: NORMAL
UROBILINOGEN UR STRIP-ACNC: NEGATIVE EU/DL
WBC # BLD AUTO: 7.29 K/UL (ref 3.9–12.7)

## 2023-07-08 PROCEDURE — 71275 CT ANGIOGRAPHY CHEST: CPT | Mod: TC

## 2023-07-08 PROCEDURE — 36415 COLL VENOUS BLD VENIPUNCTURE: CPT | Performed by: INTERNAL MEDICINE

## 2023-07-08 PROCEDURE — 71045 X-RAY EXAM CHEST 1 VIEW: CPT | Mod: TC

## 2023-07-08 PROCEDURE — 81003 URINALYSIS AUTO W/O SCOPE: CPT | Performed by: INTERNAL MEDICINE

## 2023-07-08 PROCEDURE — 99285 EMERGENCY DEPT VISIT HI MDM: CPT | Mod: 25

## 2023-07-08 PROCEDURE — 96374 THER/PROPH/DIAG INJ IV PUSH: CPT | Mod: 59

## 2023-07-08 PROCEDURE — 63600175 PHARM REV CODE 636 W HCPCS: Performed by: INTERNAL MEDICINE

## 2023-07-08 PROCEDURE — 71275 CT ANGIOGRAPHY CHEST: CPT | Mod: 26,,, | Performed by: RADIOLOGY

## 2023-07-08 PROCEDURE — 71045 X-RAY EXAM CHEST 1 VIEW: CPT | Mod: 26,,, | Performed by: RADIOLOGY

## 2023-07-08 PROCEDURE — 25500020 PHARM REV CODE 255: Performed by: INTERNAL MEDICINE

## 2023-07-08 PROCEDURE — 85025 COMPLETE CBC W/AUTO DIFF WBC: CPT | Performed by: INTERNAL MEDICINE

## 2023-07-08 PROCEDURE — 71275 CTA CHEST NON CORONARY (PE STUDIES): ICD-10-PCS | Mod: 26,,, | Performed by: RADIOLOGY

## 2023-07-08 PROCEDURE — 84484 ASSAY OF TROPONIN QUANT: CPT | Performed by: INTERNAL MEDICINE

## 2023-07-08 PROCEDURE — 80053 COMPREHEN METABOLIC PANEL: CPT | Performed by: INTERNAL MEDICINE

## 2023-07-08 PROCEDURE — 85379 FIBRIN DEGRADATION QUANT: CPT | Performed by: INTERNAL MEDICINE

## 2023-07-08 PROCEDURE — 83880 ASSAY OF NATRIURETIC PEPTIDE: CPT | Performed by: INTERNAL MEDICINE

## 2023-07-08 PROCEDURE — 71045 XR CHEST AP PORTABLE: ICD-10-PCS | Mod: 26,,, | Performed by: RADIOLOGY

## 2023-07-08 RX ORDER — HYDROXYZINE HYDROCHLORIDE 25 MG/1
25 TABLET, FILM COATED ORAL 3 TIMES DAILY PRN
Qty: 90 TABLET | Refills: 1 | Status: SHIPPED | OUTPATIENT
Start: 2023-07-08 | End: 2023-11-18 | Stop reason: SDUPTHER

## 2023-07-08 RX ORDER — HYDRALAZINE HYDROCHLORIDE 20 MG/ML
5 INJECTION INTRAMUSCULAR; INTRAVENOUS
Status: COMPLETED | OUTPATIENT
Start: 2023-07-08 | End: 2023-07-08

## 2023-07-08 RX ADMIN — IOHEXOL 75 ML: 350 INJECTION, SOLUTION INTRAVENOUS at 11:07

## 2023-07-08 RX ADMIN — HYDRALAZINE HYDROCHLORIDE 5 MG: 20 INJECTION INTRAMUSCULAR; INTRAVENOUS at 10:07

## 2023-07-08 NOTE — ED TRIAGE NOTES
Pt presents to the er with c/o hypertension and mild sob since yesterday. Pt states her chest is sore to touch as well. Pt states she takes clonidine at home but not controlling blood pressure

## 2023-07-08 NOTE — ED PROVIDER NOTES
Encounter Date: 7/8/2023       History     Chief Complaint   Patient presents with    Hypertension     Pt states she checked her bp at home and was 190s systolic. Pt also reports some mild sob      Patient comes into emergency room because he is having difficult control elevated blood pressure for the last 2 weeks.  Patient denies any chest pain, shortness breath, headache or neurologic complaints nausea vomiting.  Patient has seen her primary care provider approximally 2 months ago and he has been adjusted medicine, and also has a take clonidine on a p.r.n. basis.  She states for last 1 week though she is been taking clonidine on a daily basis.  She is been communicating with her provider to message in and her pressure has been high as 180 systolic.    The patient was seen here in this emergency room in March of 2022 with a similar complaints.  She was referred to Cardiology but states that she felt she did not need to see Cardiology.  She was referred to Cardiology by her primary care provider in January but also refused to see a cardiologist.  She was seen in [facility] in January of 2023 with chest pain leg pain and rule out cardiac ischemia and DVT.      Review of patient's allergies indicates:   Allergen Reactions    Adhesive Rash    Morphine     Oxymetazoline hcl Rash    Sulfa (sulfonamide antibiotics) Nausea And Vomiting    Xylometazoline hcl      Past Medical History:   Diagnosis Date    Arthritis     Cataracts, bilateral     Osteopenia     Thyroid disease      Past Surgical History:   Procedure Laterality Date    CHOLECYSTECTOMY      DILATION AND CURETTAGE OF UTERUS      EPIDURAL STEROID INJECTION N/A 12/23/2020    Procedure: RACHAEL Caudal;  Surgeon: Jim Ugalde MD;  Location: [facility] OR;  Service: Pain Management;  Laterality: N/A;    EYE SURGERY      GALLBLADDER SURGERY      HYSTERECTOMY      HYSTEROSCOPIC SURGICAL PROCEDURE N/A 5/27/2019    Procedure: HYSTEROSCOPY, THERAPEUTIC;  Surgeon:  Gordon Cedillo MD;  Location: [facility] OR;  Service: OB/GYN;  Laterality: N/A;  mysoure    HYSTEROSCOPY WITH DILATION AND CURETTAGE OF UTERUS N/A 5/27/2019    Procedure: HYSTEROSCOPY, WITH DILATION AND CURETTAGE OF UTERUS;  Surgeon: Gordon Cedillo MD;  Location: [facility] OR;  Service: OB/GYN;  Laterality: N/A;    JOINT REPLACEMENT Bilateral     Hips    lamenectomy      ROBOT-ASSISTED LAPAROSCOPIC ABDOMINAL SACROCOLPOPEXY USING DA DANETTE XI N/A 11/9/2021    Procedure: XI ROBOTIC SACROCOLPOPEXY, ABDOMINAL;  Surgeon: Reza Napoles MD;  Location: [facility] OR;  Service: OB/GYN;  Laterality: N/A;    ROBOT-ASSISTED LAPAROSCOPIC SALPINGO-OOPHORECTOMY USING DA DANETTE XI Bilateral 11/9/2021    Procedure: XI ROBOTIC SALPINGO-OOPHORECTOMY;  Surgeon: Reza Napoles MD;  Location: [facility] OR;  Service: OB/GYN;  Laterality: Bilateral;    ROBOT-ASSISTED SUBTOTAL HYSTERECTOMY N/A 11/9/2021    Procedure: ROBOTIC HYSTERECTOMY, SUPRACERVICAL;  Surgeon: Reza Napoles MD;  Location: [facility] OR;  Service: OB/GYN;  Laterality: N/A;    TOTAL REPLACEMENT OF BOTH HIP JOINTS USING COMPUTER-ASSISTED NAVIGATION       No family history on file.  Social History     Tobacco Use    Smoking status: Never    Smokeless tobacco: Never   Substance Use Topics    Alcohol use: Not Currently    Drug use: Never     Review of Systems   Constitutional:  Negative for fever.   HENT:  Negative for sore throat.    Respiratory:  Negative for shortness of breath.    Cardiovascular:  Negative for chest pain.   Gastrointestinal:  Negative for nausea.   Genitourinary:  Negative for dysuria.   Musculoskeletal:  Negative for back pain.   Skin:  Negative for rash.   Neurological:  Negative for weakness.   Hematological:  Does not bruise/bleed easily.     Physical Exam     Initial Vitals [07/08/23 0948]   BP Pulse Resp Temp SpO2   (!) 188/91 80 16 98.2 °F (36.8 °C) 100 %      MAP       --         Physical Exam    Vitals reviewed.  Constitutional: She appears well-developed  and well-nourished.   Eyes: Pupils are equal, round, and reactive to light.   Neck: Trachea normal. Neck supple.   Normal range of motion.   Full passive range of motion without pain.     Cardiovascular:  Normal rate, regular rhythm and normal pulses.           Pulmonary/Chest: Effort normal and breath sounds normal.   Abdominal: Abdomen is soft and flat. Bowel sounds are normal.   Musculoskeletal:         General: Normal range of motion.      Cervical back: Full passive range of motion without pain, normal range of motion and neck supple.     Neurological: She is alert. She has normal strength. No cranial nerve deficit or sensory deficit. GCS eye subscore is 4. GCS verbal subscore is 5. GCS motor subscore is 6.   Skin: Skin is warm.   Psychiatric: She has a normal mood and affect.       ED Course   Procedures  Labs Reviewed   CBC W/ AUTO DIFFERENTIAL - Abnormal; Notable for the following components:       Result Value    Hematocrit 36.2 (*)     All other components within normal limits   COMPREHENSIVE METABOLIC PANEL - Abnormal; Notable for the following components:    Sodium 129 (*)     CO2 22 (*)     All other components within normal limits   D DIMER, QUANTITATIVE - Abnormal; Notable for the following components:    D-Dimer 0.53 (*)     All other components within normal limits   TROPONIN I   B-TYPE NATRIURETIC PEPTIDE   URINALYSIS, REFLEX TO URINE CULTURE    Narrative:     Preferred Collection Type->Urine, Clean Catch  Specimen Source->Urine     EKG Readings: (Independently Interpreted)   Initial Reading: No STEMI. Previous EKG: Compared with most recent EKG Previous EKG Date: 08/07/2022, 03/04/2022. Rhythm: Normal Sinus Rhythm. Heart Rate: Eighty-one. Ectopy: No Ectopy. Conduction: Normal. ST Segments: Normal ST Segments. T Waves: Normal. Axis: Normal. Clinical Impression: Normal Sinus Rhythm   Normal sinus rhythm heart rate of 81 and no acute ischemic changes or LVH.     Imaging Results              CTA Chest  Non-Coronary (PE Studies) (Final result)  Result time 07/08/23 12:44:56      Final result by Samm Albarado MD (07/08/23 12:44:56)                   Impression:      1. No pulmonary embolus to the segmental artery level.  2. Scattered peripheral interstitial disease.  This is of unknown acuity although suspicious for mild interstitial fibrosis.  3. Coronary artery disease.      Electronically signed by: Samm Albarado  Date:    07/08/2023  Time:    12:44               Narrative:    EXAMINATION:  CTA CHEST NON CORONARY (PE STUDIES)    CLINICAL HISTORY:  Pulmonary embolism (PE) suspected, positive D-dimer;    TECHNIQUE:  Low dose axial images, sagittal and coronal reformations were obtained from the thoracic inlet to the lung bases following the IV administration of 75 mL of Omnipaque 350.  Contrast timing was optimized to evaluate the pulmonary arteries.  MIP images were performed.    COMPARISON:  None    FINDINGS:  Throughout both lungs there are scattered subpleural areas of interlobular septal thickening and reticular opacity.  Findings are slightly worse at the lung bases.  Slight peripheral bronchiectasis right lower lobe.  No filling defects central airways.  Normal size heart with coronary artery disease.  No pulmonary artery filling defect to the segmental level in either lung.  Borderline enlarged lymph node AP window 1 cm series 3, image 106.  Cholecystectomy clips.  Multilevel spinal degenerative change.                                       X-Ray Chest AP Portable (Final result)  Result time 07/08/23 10:52:20      Final result by Samm Albarado MD (07/08/23 10:52:20)                   Impression:      No acute cardiopulmonary abnormality.      Electronically signed by: Samm Albardao  Date:    07/08/2023  Time:    10:52               Narrative:    EXAMINATION:  XR CHEST AP PORTABLE    CLINICAL HISTORY:  Chest Pain;    TECHNIQUE:  Single frontal view of the chest was  performed.    COMPARISON:  08/07/2022    FINDINGS:  Lungs are clear.  Unchanged heart size.  Aortic arch atherosclerosis.  No pleural effusion or pneumothorax.  Cholecystectomy.                                    X-Rays:   Independently Interpreted Readings:   Other Readings:  Cardiomegaly without acute infiltrates consolidation or effusions  Medications   hydrALAZINE injection 5 mg (5 mg Intravenous Given 7/8/23 1000)   iohexoL (OMNIPAQUE 350) injection 75 mL (75 mLs Intravenous Given 7/8/23 1119)     Medical Decision Making:   History:   Old Medical Records: I decided to obtain old medical records.  Old Records Summarized: records from clinic visits and records from previous admission(s).       <> Summary of Records: Review of the records shows the patient was seen in 03/04/2022 with the same complaint and workup was negative.  She also was seen in another hospital in January of this year and also a primary care provider's office in April.  Initial Assessment:   Patient presents with elevated blood pressure for the past 2 weeks.  Differential Diagnosis:   Uncontrolled hypertension, cardiac ischemia, electrolyte imbalance, rule out pulmonary disease including pulmonary embolus, pneumonia  Clinical Tests:   Lab Tests: Ordered and Reviewed  The following lab test(s) were unremarkable: CBC, CMP, Troponin and BNP       <> Summary of Lab: CBC, CMP, troponin all cardiac markers normal.  D-dimer 0.53 is elevated.  Radiological Study: Ordered and Reviewed  Medical Tests: Ordered and Reviewed  ED Management:  Patient underwent CTA of the chest that showed possible interstitial fibrosis but no congestive heart failure or pneumonia or pulmonary embolus.  I discussed with the patient the need for cardiology referral, for evaluation of her hypertension and then just a medication that will be more appropriate.  See no indication to admit the patient times his blood pressures seemed come down.           ED Course as of 07/08/23  1251   Sat Jul 08, 2023   1003 CBC auto differential(!) [PW]   1021 D dimer, quantitative(!) [PW]   1021 D-Dimer(!): 0.53 [PW]   1025 Comprehensive metabolic panel(!) [PW]   1037 Troponin I: <0.006 [PW]   1037 BNP: 70 [PW]   1112 Urinalysis, Reflex to Urine Culture Urine, Clean Catch [PW]   1125 X-Ray Chest AP Portable [PW]   1248 CTA Chest Non-Coronary (PE Studies) [PW]      ED Course User Index  [PW] Adolfo Del Rosario MD                   Clinical Impression:   Final diagnoses:  [R07.9] Chest pain  [I16.0] Hypertensive urgency (Primary)        ED Disposition Condition    Discharge Stable          ED Prescriptions    None       Follow-up Information       Follow up With Specialties Details Why Contact Info    Braden Newby MD Family Medicine In 3 days  [address] [facility] [place] [address]  [phone]               Adolfo Del Rosario MD  07/08/23 5047

## 2023-07-09 ENCOUNTER — PATIENT MESSAGE (OUTPATIENT)
Dept: FAMILY MEDICINE | Facility: CLINIC | Age: 88
End: 2023-07-09
Payer: MEDICARE

## 2023-07-12 ENCOUNTER — TELEPHONE (OUTPATIENT)
Dept: FAMILY MEDICINE | Facility: CLINIC | Age: 88
End: 2023-07-12
Payer: MEDICARE

## 2023-07-12 NOTE — TELEPHONE ENCOUNTER
----- Message from Jordan Banegas sent at 7/12/2023  1:41 PM CDT -----  Regarding: blood pressure concerns and hospital follow up  Contact: pt  Type:  Sooner Apoointment Request    Caller is requesting a sooner appointment.  Caller declined first available appointment listed below.  Caller will not accept being placed on the waitlist and is requesting a message be sent to doctor.  Name of Caller:pt  When is the first available appointment?8/10  Symptoms:hospital follow up discharged today and blood pressure concerns  Would the patient rather a call back or a response via [facility]? Call back  Best Call Back Number:[phone]    Additional Information: sts she was discharged from the hospital today and has blood pressure concerns---please advise--thank you

## 2023-07-12 NOTE — TELEPHONE ENCOUNTER
----- Message from Jordan Banegas sent at 7/12/2023  1:41 PM CDT -----  Regarding: blood pressure concerns and hospital follow up  Contact: pt  Type:  Sooner Apoointment Request    Caller is requesting a sooner appointment.  Caller declined first available appointment listed below.  Caller will not accept being placed on the waitlist and is requesting a message be sent to doctor.  Name of Caller:pt  When is the first available appointment?8/10  Symptoms:hospital follow up discharged today and blood pressure concerns  Would the patient rather a call back or a response via [facility]? Call back  Best Call Back Number:[phone]    Additional Information: sts she was discharged from the hospital today and has blood pressure concerns---please advise--thank you

## 2023-07-13 ENCOUNTER — TELEPHONE (OUTPATIENT)
Dept: CARDIOLOGY | Facility: CLINIC | Age: 88
End: 2023-07-13
Payer: MEDICARE

## 2023-07-13 NOTE — TELEPHONE ENCOUNTER
Spoke with pt about cardio referral. She stated she had no idea we had a cardiologist. She already seen another doctor. She requested to cancel the referral.

## 2023-07-20 ENCOUNTER — OFFICE VISIT (OUTPATIENT)
Dept: FAMILY MEDICINE | Facility: CLINIC | Age: 88
End: 2023-07-20
Payer: MEDICARE

## 2023-07-20 VITALS
TEMPERATURE: 98 F | RESPIRATION RATE: 18 BRPM | SYSTOLIC BLOOD PRESSURE: 125 MMHG | BODY MASS INDEX: 32.6 KG/M2 | HEIGHT: 63 IN | WEIGHT: 184 LBS | OXYGEN SATURATION: 98 % | DIASTOLIC BLOOD PRESSURE: 72 MMHG | HEART RATE: 67 BPM

## 2023-07-20 DIAGNOSIS — I10 ESSENTIAL HYPERTENSION: Primary | ICD-10-CM

## 2023-07-20 DIAGNOSIS — M25.551 BILATERAL HIP PAIN: ICD-10-CM

## 2023-07-20 DIAGNOSIS — M25.552 BILATERAL HIP PAIN: ICD-10-CM

## 2023-07-20 PROCEDURE — 99214 OFFICE O/P EST MOD 30 MIN: CPT | Mod: PBBFAC | Performed by: FAMILY MEDICINE

## 2023-07-20 PROCEDURE — 99213 OFFICE O/P EST LOW 20 MIN: CPT | Mod: S$PBB,,, | Performed by: FAMILY MEDICINE

## 2023-07-20 PROCEDURE — 99213 PR OFFICE/OUTPT VISIT, EST, LEVL III, 20-29 MIN: ICD-10-PCS | Mod: S$PBB,,, | Performed by: FAMILY MEDICINE

## 2023-07-20 PROCEDURE — 99999 PR PBB SHADOW E&M-EST. PATIENT-LVL IV: ICD-10-PCS | Mod: PBBFAC,,, | Performed by: FAMILY MEDICINE

## 2023-07-20 PROCEDURE — 99999 PR PBB SHADOW E&M-EST. PATIENT-LVL IV: CPT | Mod: PBBFAC,,, | Performed by: FAMILY MEDICINE

## 2023-07-20 RX ORDER — CHLORHEXIDINE GLUCONATE ORAL RINSE 1.2 MG/ML
SOLUTION DENTAL
COMMUNITY
Start: 2023-07-12

## 2023-07-20 RX ORDER — ASPIRIN 81 MG/1
81 TABLET ORAL DAILY
COMMUNITY
End: 2023-11-07

## 2023-07-21 ENCOUNTER — TELEPHONE (OUTPATIENT)
Dept: FAMILY MEDICINE | Facility: CLINIC | Age: 88
End: 2023-07-21
Payer: MEDICARE

## 2023-07-21 RX ORDER — HYDROCODONE BITARTRATE AND ACETAMINOPHEN 5; 325 MG/1; MG/1
1 TABLET ORAL EVERY 12 HOURS PRN
Qty: 30 TABLET | Refills: 0 | Status: SHIPPED | OUTPATIENT
Start: 2023-07-21 | End: 2023-08-14

## 2023-07-21 NOTE — PROGRESS NOTES
[facility] - Clinic Note    Subjective      Ms. Pedroza is a 88 y.o. female who presents to clinic for Hospital Follow Up    Patient is still having hip pain.  Blood pressure has been up and down but seems to be better now.    [facility] Mahsa has a past medical history of Arthritis, Cataracts, bilateral, Osteopenia, and Thyroid disease.   [facility]XH Mahas has a past surgical history that includes Eye surgery; lamenectomy; Total replacement of both hip joints using computer-assisted navigation; Gallbladder surgery; Dilation and curettage of uterus; Hysteroscopy with dilation and curettage of uterus (N/A, 5/27/2019); Hysteroscopic surgical procedure (N/A, 5/27/2019); Cholecystectomy; Epidural steroid injection (N/A, 12/23/2020); Joint replacement (Bilateral); Robot-assisted subtotal hysterectomy (N/A, 11/9/2021); Robot-assisted laparoscopic abdominal sacrocolpopexy using da Lizet Xi (N/A, 11/9/2021); Robot-assisted laparoscopic salpingo-oophorectomy using da Lizet Xi (Bilateral, 11/9/2021); and Hysterectomy.    Mahsa's family history is not on file.   DANII Bragg reports that she has never smoked. She has never used smokeless tobacco. She reports that she does not currently use alcohol. She reports that she does not use drugs.   BRISA Bragg is allergic to adhesive, morphine, oxymetazoline hcl, sulfa (sulfonamide antibiotics), and xylometazoline hcl.   VAL Bragg has a current medication list which includes the following prescription(s): amlodipine, aspirin, atorvastatin, chlorhexidine, diclofenac sodium, estradiol, fluticasone propionate, hydroxyzine hcl, up4 probiotics adult, levothyroxine, losartan, meloxicam, clonidine, and hydrocodone-acetaminophen, and the following Facility-Administered Medications: lactated ringers.     Review of Systems   Constitutional: Negative for chills and fever.   Respiratory: Negative for shortness of breath.    Cardiovascular: Negative for chest pain.   Musculoskeletal: Positive for  "myalgias.     Objective     /72 (BP Location: Right arm, Patient Position: Sitting, BP Method: Large (Manual))   Pulse 67   Temp 97.8 °F (36.6 °C) (Temporal)   Resp 18   Ht 5' 3" (1.6 m)   Wt 83.5 kg (184 lb)   SpO2 98%   BMI 32.59 kg/m²     Physical Exam  Vitals and nursing note reviewed.   Constitutional:       General: She is not in acute distress.     Appearance: Normal appearance. She is well-developed. She is not diaphoretic.      Comments: Ambulating with walker   HENT:      Head: Normocephalic and atraumatic.      Right Ear: External ear normal.      Left Ear: External ear normal.   Eyes:      General:         Right eye: No discharge.         Left eye: No discharge.   Cardiovascular:      Rate and Rhythm: Normal rate and regular rhythm.      Heart sounds: Normal heart sounds.   Pulmonary:      Effort: Pulmonary effort is normal.      Breath sounds: Normal breath sounds. No wheezing or rales.   Skin:     General: Skin is warm and dry.   Neurological:      Mental Status: She is alert and oriented to person, place, and time. Mental status is at baseline.   Psychiatric:         Mood and Affect: Mood normal.         Behavior: Behavior normal.         Thought Content: Thought content normal.         Judgment: Judgment normal.        Assessment/Plan     1. Essential hypertension        2. Bilateral hip pain  HYDROcodone-acetaminophen (NORCO) 5-325 mg per tablet        Blood pressure is now controlled.  Continue pain medication as needed as it improves patient's quality of life.  Follow-up in 3 months.    1. Essential hypertension    2. Bilateral hip pain  -     HYDROcodone-acetaminophen (NORCO) 5-325 mg per tablet; Take 1 tablet by mouth every 12 (twelve) hours as needed for Pain.  Dispense: 30 tablet; Refill: 0      Future Appointments   Date Time Provider Department Center   8/14/2023 10:20 AM Braden Newby MD [facility]           Braden Newby MD  Family Medicine  [facility]

## 2023-07-26 ENCOUNTER — TELEPHONE (OUTPATIENT)
Dept: FAMILY MEDICINE | Facility: CLINIC | Age: 88
End: 2023-07-26
Payer: MEDICARE

## 2023-07-26 DIAGNOSIS — M25.551 BILATERAL HIP PAIN: Primary | ICD-10-CM

## 2023-07-26 DIAGNOSIS — M25.552 BILATERAL HIP PAIN: Primary | ICD-10-CM

## 2023-07-26 RX ORDER — TIZANIDINE 2 MG/1
4 TABLET ORAL EVERY 8 HOURS PRN
Qty: 30 TABLET | Refills: 0 | Status: SHIPPED | OUTPATIENT
Start: 2023-07-26

## 2023-07-26 NOTE — TELEPHONE ENCOUNTER
----- Message from Martine Medley sent at 7/26/2023 10:31 AM CDT -----  Type: Needs Medical Advice    Who Called:  Lisa from [facility]    Best Call Back Number: [phone]    Additional Information: Lisa is calling to speak with someone in office about a referral that was sent over. Please call back to advise. Thanks!

## 2023-07-26 NOTE — TELEPHONE ENCOUNTER
Spoke with Lisa with [facility]. Lisa reports pt having pain with urination, along with itching. VO given to collect for UA.     Requesting muscle relaxant for hip pain

## 2023-08-03 ENCOUNTER — DOCUMENT SCAN (OUTPATIENT)
Dept: HOME HEALTH SERVICES | Facility: HOSPITAL | Age: 88
End: 2023-08-03
Payer: MEDICARE

## 2023-08-09 ENCOUNTER — TELEPHONE (OUTPATIENT)
Dept: FAMILY MEDICINE | Facility: CLINIC | Age: 88
End: 2023-08-09
Payer: MEDICARE

## 2023-08-09 DIAGNOSIS — I10 ESSENTIAL HYPERTENSION: ICD-10-CM

## 2023-08-09 DIAGNOSIS — I70.0 AORTIC ATHEROSCLEROSIS: ICD-10-CM

## 2023-08-09 DIAGNOSIS — M51.36 DEGENERATIVE DISC DISEASE, LUMBAR: Primary | ICD-10-CM

## 2023-08-09 NOTE — TELEPHONE ENCOUNTER
----- Message from Vicki Montemayor sent at 8/9/2023  8:38 AM CDT -----  Contact: pt  Type: Needs Medical Advice    Who Called:  pt    Best Call Back Number:  [phone]    Requesting a call back regarding pt is needing a social work sent to the house, so she can talk to someone about getting into a nursing home. Because she is struggling to do things on her own now.    Would like to discuss this with you, please call so she knows what she needs to do all together.     Please Advise ---Thank you

## 2023-08-13 DIAGNOSIS — M25.551 BILATERAL HIP PAIN: ICD-10-CM

## 2023-08-13 DIAGNOSIS — M25.552 BILATERAL HIP PAIN: ICD-10-CM

## 2023-08-13 RX ORDER — HYDROCODONE BITARTRATE AND ACETAMINOPHEN 5; 325 MG/1; MG/1
1 TABLET ORAL EVERY 12 HOURS PRN
Qty: 30 TABLET | Refills: 0 | Status: CANCELLED | OUTPATIENT
Start: 2023-08-13

## 2023-08-14 RX ORDER — HYDROCODONE BITARTRATE AND ACETAMINOPHEN 10; 325 MG/1; MG/1
1 TABLET ORAL EVERY 12 HOURS PRN
Qty: 30 TABLET | Refills: 0 | Status: SHIPPED | OUTPATIENT
Start: 2023-08-14 | End: 2023-09-05 | Stop reason: SDUPTHER

## 2023-09-05 DIAGNOSIS — M25.551 BILATERAL HIP PAIN: ICD-10-CM

## 2023-09-05 DIAGNOSIS — M25.552 BILATERAL HIP PAIN: ICD-10-CM

## 2023-09-05 RX ORDER — HYDROCODONE BITARTRATE AND ACETAMINOPHEN 10; 325 MG/1; MG/1
1 TABLET ORAL EVERY 12 HOURS PRN
Qty: 30 TABLET | Refills: 0 | Status: SHIPPED | OUTPATIENT
Start: 2023-09-05 | End: 2023-09-19 | Stop reason: SDUPTHER

## 2023-09-12 RX ORDER — LEVOTHYROXINE SODIUM 50 UG/1
50 TABLET ORAL
Qty: 90 TABLET | Refills: 3 | Status: SHIPPED | OUTPATIENT
Start: 2023-09-12

## 2023-09-12 NOTE — TELEPHONE ENCOUNTER
----- Message from Kelsie Jeffrey LPN sent at 9/11/2023  4:09 PM CDT -----    ----- Message -----  From: Noemi Macario  Sent: 9/11/2023   4:08 PM CDT  To: [facility] Staff    Type:  Same Day Appointment Request    Caller is requesting a same day appointment.  Caller declined first available appointment listed below.      Name of Caller:pt    When is the first available appointment?10/6    Symptoms:lower back and hip pain    Best Call Back Number:[phone]      Additional Information: has an appt scheduled for 10/6 but says there is no way that she can wait that long to see the Dr      Please call Back to advise. Thanks!

## 2023-09-13 ENCOUNTER — OFFICE VISIT (OUTPATIENT)
Dept: FAMILY MEDICINE | Facility: CLINIC | Age: 88
End: 2023-09-13
Payer: MEDICARE

## 2023-09-13 VITALS
DIASTOLIC BLOOD PRESSURE: 84 MMHG | BODY MASS INDEX: 32.59 KG/M2 | TEMPERATURE: 98 F | OXYGEN SATURATION: 98 % | RESPIRATION RATE: 18 BRPM | SYSTOLIC BLOOD PRESSURE: 136 MMHG | HEIGHT: 63 IN | HEART RATE: 70 BPM

## 2023-09-13 DIAGNOSIS — M51.36 DEGENERATIVE DISC DISEASE, LUMBAR: Primary | ICD-10-CM

## 2023-09-13 PROCEDURE — 99999 PR PBB SHADOW E&M-EST. PATIENT-LVL V: ICD-10-PCS | Mod: PBBFAC,,, | Performed by: FAMILY MEDICINE

## 2023-09-13 PROCEDURE — 99215 OFFICE O/P EST HI 40 MIN: CPT | Mod: PBBFAC | Performed by: FAMILY MEDICINE

## 2023-09-13 PROCEDURE — 99214 OFFICE O/P EST MOD 30 MIN: CPT | Mod: S$PBB,,, | Performed by: FAMILY MEDICINE

## 2023-09-13 PROCEDURE — 99999 PR PBB SHADOW E&M-EST. PATIENT-LVL V: CPT | Mod: PBBFAC,,, | Performed by: FAMILY MEDICINE

## 2023-09-13 PROCEDURE — 99214 PR OFFICE/OUTPT VISIT, EST, LEVL IV, 30-39 MIN: ICD-10-PCS | Mod: S$PBB,,, | Performed by: FAMILY MEDICINE

## 2023-09-13 NOTE — PROGRESS NOTES
[facility] - Clinic Note    Subjective      Ms. Pedroza is a 88 y.o. female who presents to clinic for Back Pain and Hip Pain    Patient is still having back and hip pain.    [facility] Mahsa has a past medical history of Arthritis, Cataracts, bilateral, Osteopenia, and Thyroid disease.   PSXH Mahsa has a past surgical history that includes Eye surgery; lamenectomy; Total replacement of both hip joints using computer-assisted navigation; Gallbladder surgery; Dilation and curettage of uterus; Hysteroscopy with dilation and curettage of uterus (N/A, 5/27/2019); Hysteroscopic surgical procedure (N/A, 5/27/2019); Cholecystectomy; Epidural steroid injection (N/A, 12/23/2020); Joint replacement (Bilateral); Robot-assisted subtotal hysterectomy (N/A, 11/9/2021); Robot-assisted laparoscopic abdominal sacrocolpopexy using da Lizet Xi (N/A, 11/9/2021); Robot-assisted laparoscopic salpingo-oophorectomy using da Lizet Xi (Bilateral, 11/9/2021); and Hysterectomy.    Mahsa's family history is not on file.   DANII Bragg reports that she has never smoked. She has never used smokeless tobacco. She reports that she does not currently use alcohol. She reports that she does not use drugs.   BRISA Bragg is allergic to adhesive, morphine, oxymetazoline hcl, sulfa (sulfonamide antibiotics), and xylometazoline hcl.   VAL Bragg has a current medication list which includes the following prescription(s): amlodipine, aspirin, atorvastatin, chlorhexidine, diclofenac sodium, estradiol, fluticasone propionate, hydrocodone-acetaminophen, hydroxyzine hcl, up4 probiotics adult, levothyroxine, losartan, tizanidine, and clonidine, and the following Facility-Administered Medications: lactated ringers.     Review of Systems   Constitutional: Negative for chills and fever.   Respiratory: Negative for shortness of breath.    Cardiovascular: Negative for chest pain.   Musculoskeletal: Positive for myalgias.     Objective     /84 (BP Location:  "Left arm, Patient Position: Sitting, BP Method: Large (Manual))   Pulse 70   Temp 97.6 °F (36.4 °C) (Temporal)   Resp 18   Ht 5' 3" (1.6 m)   SpO2 98%   BMI 32.59 kg/m²     Physical Exam  Vitals and nursing note reviewed.   Constitutional:       General: She is not in acute distress.     Appearance: Normal appearance. She is well-developed. She is not diaphoretic.      Comments: Ambulating with walker   HENT:      Head: Normocephalic and atraumatic.      Right Ear: External ear normal.      Left Ear: External ear normal.   Eyes:      General:         Right eye: No discharge.         Left eye: No discharge.   Cardiovascular:      Rate and Rhythm: Normal rate and regular rhythm.      Heart sounds: Normal heart sounds.   Pulmonary:      Effort: Pulmonary effort is normal.      Breath sounds: Normal breath sounds. No wheezing or rales.   Skin:     General: Skin is warm and dry.   Neurological:      Mental Status: She is alert and oriented to person, place, and time. Mental status is at baseline.   Psychiatric:         Mood and Affect: Mood normal.         Behavior: Behavior normal.         Thought Content: Thought content normal.         Judgment: Judgment normal.        Assessment/Plan     1. Degenerative disc disease, lumbar  Ambulatory referral/consult to Back & Spine Clinic    Ambulatory referral/consult to Outpatient Case Management        Blood pressure is now controlled.  Continue pain medication as needed as it improves patient's quality of life.  Referral to back and spine clinic for further evaluation of spine.  Referral to outpatient case management for long-term options.    1. Degenerative disc disease, lumbar  -     Ambulatory referral/consult to Back & Spine Clinic; Future; Expected date: 09/20/2023  -     Ambulatory referral/consult to Outpatient Case Management      Future Appointments   Date Time Provider Department Center   9/19/2023 10:15 AM Ros Hernandez PA-C [id number] "   10/25/2023 11:00 AM Braden Newby MD [facility]           Braden Newby MD  Family Medicine  [facility]

## 2023-09-19 ENCOUNTER — TELEPHONE (OUTPATIENT)
Dept: FAMILY MEDICINE | Facility: CLINIC | Age: 88
End: 2023-09-19
Payer: MEDICARE

## 2023-09-19 DIAGNOSIS — M25.552 BILATERAL HIP PAIN: ICD-10-CM

## 2023-09-19 DIAGNOSIS — M51.36 DEGENERATIVE DISC DISEASE, LUMBAR: Primary | ICD-10-CM

## 2023-09-19 DIAGNOSIS — M25.551 BILATERAL HIP PAIN: ICD-10-CM

## 2023-09-19 RX ORDER — HYDROCODONE BITARTRATE AND ACETAMINOPHEN 10; 325 MG/1; MG/1
1 TABLET ORAL EVERY 12 HOURS PRN
Qty: 30 TABLET | Refills: 0 | Status: SHIPPED | OUTPATIENT
Start: 2023-09-19 | End: 2023-09-19

## 2023-09-19 RX ORDER — HYDROCODONE BITARTRATE AND ACETAMINOPHEN 7.5; 325 MG/1; MG/1
1 TABLET ORAL EVERY 12 HOURS PRN
Qty: 30 TABLET | Refills: 0 | Status: SHIPPED | OUTPATIENT
Start: 2023-09-19 | End: 2023-09-20 | Stop reason: SDUPTHER

## 2023-09-19 NOTE — TELEPHONE ENCOUNTER
----- Message from Tg Rosas MA sent at 9/19/2023  3:21 PM CDT -----  Type:  RX Refill Request    Who Called:  pt  Refill or New Rx:  refill  RX Name and Strength:  HYDROcodone-acetaminophen (NORCO)  mg per tablet  How is the patient currently taking it? (ex. 1XDay):  as directed  Is this a 30 day or 90 day RX:  30 day  Preferred Pharmacy with phone number:    [id number] - [place] [address] [address]  [address] [address]  Phone: [phone] Fax: [phone]    [facility] - [place], [place] - [address]  [address] [address]  Phone: [phone] Fax: [phone]      Local or Mail Order:  local  Ordering Provider:  Keyonna  Best Call Back Number: [phone]    Additional Information:  the pharmacy only have 7.5 not the 10 mg please advise

## 2023-09-20 ENCOUNTER — PATIENT MESSAGE (OUTPATIENT)
Dept: ADMINISTRATIVE | Facility: OTHER | Age: 88
End: 2023-09-20
Payer: MEDICARE

## 2023-09-20 ENCOUNTER — OUTPATIENT CASE MANAGEMENT (OUTPATIENT)
Dept: ADMINISTRATIVE | Facility: OTHER | Age: 88
End: 2023-09-20
Payer: MEDICARE

## 2023-09-20 ENCOUNTER — PATIENT MESSAGE (OUTPATIENT)
Dept: FAMILY MEDICINE | Facility: CLINIC | Age: 88
End: 2023-09-20
Payer: MEDICARE

## 2023-09-20 DIAGNOSIS — M51.36 DEGENERATIVE DISC DISEASE, LUMBAR: ICD-10-CM

## 2023-09-20 RX ORDER — HYDROCODONE BITARTRATE AND ACETAMINOPHEN 7.5; 325 MG/1; MG/1
1 TABLET ORAL EVERY 12 HOURS PRN
Qty: 30 TABLET | Refills: 0 | Status: SHIPPED | OUTPATIENT
Start: 2023-09-20 | End: 2023-10-05 | Stop reason: SDUPTHER

## 2023-09-20 NOTE — LETTER
September 20, 2023             Dear Mahsa Pedroza:    Welcome to [facility] Complex Care Management Program.  It was a pleasure talking with you today.  My name is Jodi Tanner RN CCM and I look forward to being your Care Manager.  My goal is to help you function at the healthiest and highest level possible.  You can contact me directly at [phone].    As an [facility] patient, some of the services we may be able to provide include:     Development of an individualized care plan with a Registered Nurse   Connection with a   Connection with available resources and services    Coordinate communication among your care team members   Provide coaching and education   Help you understand your doctors treatment plan  Help you obtain information about your insurance coverage.     All services provided by [facility] Complex Care Managers and other care team members are coordinated with and communicated to your primary care team.      As part of your enrollment, you will be receiving education materials and more information about these services in your [facility] account, by phone or through the mail.  If you do not wish to participate or receive information, please contact our office at [phone].      Sincerely,        Jodi Tanner RN CCM [facility]   Out-patient RN Complex Care Manager

## 2023-09-20 NOTE — PROGRESS NOTES
Outpatient Care Management  Initial Patient Assessment    Patient: Mahsa Pedroza  MRN: [id number]  Date of Service: 2023  Completed by: Jodi Tanner RN  Referral Date: 2023  Program: High Risk  Status: Ongoing  Effective Dates: 2023 - present  Responsible Staff: Jodi Tanner RN        Reason for Visit   Patient presents with    OPCM Enrollment Call    Nursing Assessment       Brief Summary:  Mahsa Pedroza was referred by Dr. Newby for degenerative disc disease, lumbar. Patient qualifies for program based on high risk score 75.4%.   Active problem list, medical, surgical and social history reviewed. Active comorbidities include degenerative disc disease, lumbar, HTN, hypothyroidism and bilateral hip pain. Areas of need identified by patient include chronic pain and refer to  for nursing home placement.   Called and spoke to Mahsa Pedroza. She states that in the last year her health has declined. She lives with her son and Janee JACKSON. She has lived with them for five years. Her   in . She was living in [place], [place]. She worked for the [employer] until she retired. No falls in the past 12 months. She checks her BP two times a day and logs readings. BP was over 160 so she took a clonidine today. Pain is a 5 in her back, legs and hands. Appt with spine clinic on 23 for DDD. She is lying down during the assessment. She uses a rollator for ambulation. Unable to stand to cook. [facility] provides transportation. She would like placement at [facility]. She was there for rehab one time. She understands placement could take two or three weeks.   .   Next steps: Follow up next week-wakes up early in the morning and feels she is better in the mornings  Finish Med Rec and Medication Adherence   Add Antwan Pedroza phone number to demographics   Refer to  for placement at [facility] and SDOH   - call for medicine refill 2 or 3 days  before it runs out  - use ice or heat for pain relief  Mail degenerative disc disease and sent thru [facility] portal       Disability Status  Is the patient alert and oriented (person, place, time, and situation)?: Alert and oriented x 4  Hearing Difficulty or Deaf: no  Visual Difficulty or Blind: yes  Visual and Hearing Needs Conclusion: wears glasses and has haring aides  Difficulty Concentrating, Remembering or Making Decisions: no  Communication Difficulty: no  Eating/Swallowing Difficulty: no  Walking or Climbing Stairs Difficulty: yes  Walking or Climbing Stairs: ambulation difficulty, requires equipment; stair climbing difficulty, assistance 1 person; stair climbing difficulty, requires equipment  Mobility Management: uses a rollator  Dressing/Bathing Difficulty: no  Toileting : Independent  Continence : Continence - Not a problem  Difficulty Managing Errands Independently: yes  Errands Management: DIL runs errands  Equipment Currently Used at Home: cane, straight; wheelchair; rollator; blood pressure machine; bedside commode; grab bar; shower chair  ADL Conclusion Statement: needs assistance and DIL helps her  Change in Functional Status Since Onset of Current Illness/Injury: yes        Spiritual Beliefs  Spiritual, Cultural Beliefs, Amish Practices, Values that Affect Care: no      Social History     Socioeconomic History    Marital status:    Tobacco Use    Smoking status: Never    Smokeless tobacco: Never   Substance and Sexual Activity    Alcohol use: Not Currently    Drug use: Never    Sexual activity: Not Currently     Birth control/protection: See Surgical Hx       Roles and Relationships  Primary Source of Support/Comfort: child(regla)  Name of Support/Comfort Primary Source: Janee JACKSON           Patient Reported Insurance  Verified current insurance plan:: Medicare; Other (see comment) ([facility])            9/20/2023     2:36 PM 4/24/2023     9:53 AM 4/5/2022     9:42 AM   Depression  Patient Health Questionnaire   Over the last two weeks how often have you been bothered by little interest or pleasure in doing things Several days Not at all Not at all   Over the last two weeks how often have you been bothered by feeling down, depressed or hopeless Not at all Not at all Not at all   PHQ-2 Total Score 1 0 0       Learning Assessment       09/20/2023 1520 [facility] (9/20/2023 - Present)   Created by Jodi Tanner RN -  (Nurse) Status: Complete                 PRIMARY LEARNER     Primary Learner Name:  Mahsa Pedroza DM - 09/20/2023 1520    Relationship:  Patient DM - 09/20/2023 1520    Does the primary learner have any barriers to learning?:  Visual, Hearing DM - 09/20/2023 1520    What is the preferred language of the primary learner?:  English DM - 09/20/2023 1520    Is an  required?:  No DM - 09/20/2023 1520    How does the primary learner prefer to learn new concepts?:  Listening, Reading DM - 09/20/2023 1520    How often do you need to have someone help you read instructions, pamphlets, or written material from your doctor or pharmacy?:  Sometimes DM - 09/20/2023 1520        CO-LEARNER #1     No question answered        CO-LEARNER #2     No question answered        SPECIAL TOPICS     No question answered        ANSWERED BY:     No question answered        Edit History       Jodi Tanner, RN -  (Nurse)   09/20/2023 1520

## 2023-09-20 NOTE — TELEPHONE ENCOUNTER
See mychart message. Spoke with [facility] in [place] [address]. Pharmacist states he has low stock of NORCO 7.5 and is completely out of NORCO 10MG. Please advise.

## 2023-09-22 ENCOUNTER — TELEPHONE (OUTPATIENT)
Dept: FAMILY MEDICINE | Facility: CLINIC | Age: 88
End: 2023-09-22
Payer: MEDICARE

## 2023-09-23 ENCOUNTER — IMMUNIZATION (OUTPATIENT)
Dept: FAMILY MEDICINE | Facility: CLINIC | Age: 88
End: 2023-09-23
Payer: MEDICARE

## 2023-09-23 PROCEDURE — 99999PBSHW FLU VACCINE - QUADRIVALENT - ADJUVANTED: ICD-10-PCS | Mod: PBBFAC,,,

## 2023-09-23 PROCEDURE — 99999PBSHW FLU VACCINE - QUADRIVALENT - ADJUVANTED: Mod: PBBFAC,,,

## 2023-09-23 PROCEDURE — G0008 ADMIN INFLUENZA VIRUS VAC: HCPCS | Mod: PBBFAC

## 2023-09-25 ENCOUNTER — PATIENT MESSAGE (OUTPATIENT)
Dept: ADMINISTRATIVE | Facility: OTHER | Age: 88
End: 2023-09-25
Payer: MEDICARE

## 2023-09-25 ENCOUNTER — OUTPATIENT CASE MANAGEMENT (OUTPATIENT)
Dept: ADMINISTRATIVE | Facility: OTHER | Age: 88
End: 2023-09-25
Payer: MEDICARE

## 2023-09-25 ENCOUNTER — TELEPHONE (OUTPATIENT)
Dept: FAMILY MEDICINE | Facility: CLINIC | Age: 88
End: 2023-09-25
Payer: MEDICARE

## 2023-09-25 DIAGNOSIS — Z11.1 SCREENING FOR TUBERCULOSIS: Primary | ICD-10-CM

## 2023-09-25 NOTE — TELEPHONE ENCOUNTER
This is Celia Mathis, MADDYW,  with [facility].  I spoke with this pt today and she is wanting to be placed at [facility] for long term care.  She reports that it is becoming too hard for her son and dtr in law to care for pt at home.  I am emailing some of her medical records to [facility] but for nursing home placement she will also need a chest Xray and TB skin test. Wanted to keep you all in the loop

## 2023-09-25 NOTE — TELEPHONE ENCOUNTER
----- Message from Celia Mathis LCSW sent at 9/25/2023 11:37 AM CDT -----  Regarding: nursing home placement  This is Celia Mathis LCSW,  with [facility].  I spoke with this pt today and she is wanting to be placed at [facility] for long term care.  She reports that it is becoming too hard for her son and dtr in law to care for pt at home.  I am emailing some of her medical records to [facility] but for nursing home placement she will also need a chest Xray and TB skin test. Wanted to keep you all in the loop.      Thanks,   Celia Mathis LCSW

## 2023-09-25 NOTE — PROGRESS NOTES
Outpatient Care Management   - High Risk Patient Assessment    Patient: Mahsa Pedroza  MRN:  [id number]  Date of Service:  9/25/2023  Completed by:  Celia Mathis LCSW  Referral Date: 09/13/2023    Reason for Visit   Patient presents with    Social Work Assessment - High Risk       Brief Summary:  received a referral from OPCM BLAZE Slater for the following patient identified psycho-social needs of pt needing long term nursing home placement at [facility]. Care plan was created in collaboration with patient/caregiver input.  completed the SDOH questionnaire.

## 2023-09-28 ENCOUNTER — TELEPHONE (OUTPATIENT)
Dept: PAIN MEDICINE | Facility: CLINIC | Age: 88
End: 2023-09-28
Payer: MEDICARE

## 2023-09-28 ENCOUNTER — OUTPATIENT CASE MANAGEMENT (OUTPATIENT)
Dept: ADMINISTRATIVE | Facility: OTHER | Age: 88
End: 2023-09-28
Payer: MEDICARE

## 2023-09-28 ENCOUNTER — OFFICE VISIT (OUTPATIENT)
Dept: SPINE | Facility: CLINIC | Age: 88
End: 2023-09-28
Payer: MEDICARE

## 2023-09-28 VITALS — HEIGHT: 63 IN | BODY MASS INDEX: 32.61 KG/M2 | WEIGHT: 184.06 LBS

## 2023-09-28 DIAGNOSIS — M70.61 TROCHANTERIC BURSITIS OF RIGHT HIP: ICD-10-CM

## 2023-09-28 DIAGNOSIS — M51.36 DEGENERATIVE DISC DISEASE, LUMBAR: ICD-10-CM

## 2023-09-28 DIAGNOSIS — M54.50 CHRONIC BILATERAL LOW BACK PAIN, UNSPECIFIED WHETHER SCIATICA PRESENT: Primary | ICD-10-CM

## 2023-09-28 DIAGNOSIS — G89.29 CHRONIC BILATERAL LOW BACK PAIN, UNSPECIFIED WHETHER SCIATICA PRESENT: Primary | ICD-10-CM

## 2023-09-28 DIAGNOSIS — M54.16 LUMBAR RADICULOPATHY: Primary | ICD-10-CM

## 2023-09-28 DIAGNOSIS — M25.551 RIGHT HIP PAIN: Primary | ICD-10-CM

## 2023-09-28 PROCEDURE — 99204 PR OFFICE/OUTPT VISIT, NEW, LEVL IV, 45-59 MIN: ICD-10-PCS | Mod: S$GLB,,, | Performed by: PHYSICAL MEDICINE & REHABILITATION

## 2023-09-28 PROCEDURE — 99204 OFFICE O/P NEW MOD 45 MIN: CPT | Mod: S$GLB,,, | Performed by: PHYSICAL MEDICINE & REHABILITATION

## 2023-09-28 NOTE — PROGRESS NOTES
Outpatient Care Management  Plan of Care Follow Up Visit    Patient: Mahsa Pedroza  MRN: [id number]  Date of Service: 09/28/2023  Completed by: Jodi Tanner RN  Referral Date: 09/13/2023    No chief complaint on file.      Brief Summary: Spine clinic appt today and [facility] will provide transportation. Pain is a 3.   Med Rec done  Added Antwan's phone number to demographics [phone]  Next Steps: Follow up in two weeks on or around 10/12/23  - call for medicine refill 2 or 3 days before it runs out  - use ice or heat for pain relief

## 2023-09-28 NOTE — TELEPHONE ENCOUNTER
----- Message from Antwan Nava MD sent at 9/28/2023  2:34 PM CDT -----  Please schedule for transforaminal injection right L4-5 L5-S1

## 2023-09-28 NOTE — PROGRESS NOTES
SUBJECTIVE:    Patient ID: Mahsa Pedroza is a 88 y.o. female.    Chief Complaint: Low-back Pain and Hip Pain    This is an 88-year-old woman who sees Dr. Newby for her primary care.  Other than hypertension and hypothyroidism she denies any chronic major medical problems.  She is had bilateral total hip replacements.  Unspecified lumbar laminectomy roughly 12 or 13 years ago.  No cancer history.  Long history of low back pain.  She presents with a primary complaint of right lateral hip discomfort.  Been going on for a long time.  Hurts to press on the area.  Hurts to lie on it.  She has some discomfort over her tailbone since a fall many years ago sometimes has radiating discomfort into the right gluteal region but she does not have radicular symptoms that extend below the knees.  No bowel or bladder dysfunction fever chills sweats or unexpected weight loss.  She saw Dr. Ugalde in 2020 and had an excellent response to a left trochanteric bursal injection.  She describes 100% relief according to the record.  She subsequently had a caudal epidural steroid injection on 12/23/2020 also with Dr. Ugalde which she said did not help.  She never did follow-up with him.  Her current pain level is 10/10 and interferes with her quality of life in terms of activities daily living recreation and social activities.  She is had to start ambulating with a rolling walker.      I reviewed an MRI of the lumbar spine done 12/08/2020 which is summarized below:    FINDINGS:  Motion limited exam.     Alignment: 3 mm retrolisthesis L2 on L3 and 6 mm anterolisthesis L4 on L5. levoscoliosis centered at L2.     Vertebrae: No acute fracture or marrow replacement.  Multiple Schmorl nodes.  Discogenic sub endplate degenerative changes about the L3-4 and L4-5 levels.  Central laminectomy at L4 and L5.     Discs: Multilevel disc desiccation.  Disc height loss severe at L2-3 and moderate at L4-5 and mild at the remaining included levels.      Cord: Normal.  Conus terminates at L1.     Paraspinal muscles & soft tissues: Unremarkable.     Degenerative findings:     T11-12: Mild posterior disc bulge seen only on sagittal.     T12-L1: There is a right paracentral disc extrusion with disc material extending 5 mm below and 3 mm above the disc level within the anterior epidural space.  Bilateral facet degenerative change.  Narrowed right lateral recess.     L1-L2: There is broad-based disc bulge and bilateral facet degenerative change.  Minimal spinal canal stenosis.     L2-L3: There is posterior disc osteophyte complex bilateral facet degenerative change and ligamentum flavum thickening which contribute to mild spinal canal stenosis and mild right neural foraminal narrowing.     L3-L4: There is mild broad-based disc bulge and bilateral facet degenerative changes.  Minimal spinal canal stenosis.  Mild bilateral neural foraminal narrowing.     L4-L5: Anterolisthesis uncovers the disc.  Broad-based disc bulge.  Bilateral facet degenerative change.  Mild spinal canal stenosis.  Severe left neural foraminal narrowing.     L5-S1: There is broad-based disc bulge with central disc fissure.  Left facet degenerative change.  Mild left neural foraminal narrowing.     Impression:     1. Malalignment including scoliosis, L2 on L3 retrolisthesis and L4 on L5 anterolisthesis.  2. Multilevel degenerative change including disc extrusion at T12-L1 and annular disc fissure at L5-S1.  Extruded disc material encroaches the descending right L1 nerve in the lateral recess.  3. Degenerative changes contribute to severe left neural foraminal narrowing at L4-5.  No high-grade spinal canal stenosis.        Past Medical History:   Diagnosis Date    Arthritis     Cataracts, bilateral     Osteopenia     Thyroid disease      Social History     Socioeconomic History    Marital status:    Tobacco Use    Smoking status: Never    Smokeless tobacco: Never   Substance and Sexual Activity     Alcohol use: Not Currently    Drug use: Never    Sexual activity: Not Currently     Birth control/protection: See Surgical Hx     Social Determinants of Health     Financial Resource Strain: Low Risk  (9/25/2023)    Overall Financial Resource Strain (CARDIA)     Difficulty of Paying Living Expenses: Not hard at all   Food Insecurity: No Food Insecurity (9/25/2023)    Hunger Vital Sign     Worried About Running Out of Food in the Last Year: Never true     Ran Out of Food in the Last Year: Never true   Transportation Needs: No Transportation Needs (9/25/2023)    PRAPARE - Transportation     Lack of Transportation (Medical): No     Lack of Transportation (Non-Medical): No   Physical Activity: Inactive (9/25/2023)    Exercise Vital Sign     Days of Exercise per Week: 0 days     Minutes of Exercise per Session: 0 min   Stress: Stress Concern Present (9/25/2023)    [employer] [employer] - Occupational Stress Questionnaire     Feeling of Stress : Very much   Social Connections: Moderately Isolated (9/25/2023)    Social Connection and Isolation Panel [NHANES]     Frequency of Communication with Friends and Family: More than three times a week     Frequency of Social Gatherings with Friends and Family: Never     Attends Jewish Services: More than 4 times per year     Active Member of Clubs or Organizations: No     Attends Club or Organization Meetings: Never     Marital Status:    Housing Stability: Low Risk  (9/25/2023)    Housing Stability Vital Sign     Unable to Pay for Housing in the Last Year: No     Number of Places Lived in the Last Year: 1     Unstable Housing in the Last Year: No     Past Surgical History:   Procedure Laterality Date    CHOLECYSTECTOMY      DILATION AND CURETTAGE OF UTERUS      EPIDURAL STEROID INJECTION N/A 12/23/2020    Procedure: RACHAEL Caudal;  Surgeon: Jim Ugalde MD;  Location: [facility];  Service: Pain Management;  Laterality: N/A;    EYE SURGERY       "GALLBLADDER SURGERY      HYSTERECTOMY      HYSTEROSCOPIC SURGICAL PROCEDURE N/A 5/27/2019    Procedure: HYSTEROSCOPY, THERAPEUTIC;  Surgeon: Gordon Cedillo MD;  Location: [facility] OR;  Service: OB/GYN;  Laterality: N/A;  mysoure    HYSTEROSCOPY WITH DILATION AND CURETTAGE OF UTERUS N/A 5/27/2019    Procedure: HYSTEROSCOPY, WITH DILATION AND CURETTAGE OF UTERUS;  Surgeon: Gordon Cedillo MD;  Location: [facility] OR;  Service: OB/GYN;  Laterality: N/A;    JOINT REPLACEMENT Bilateral     Hips    lamenectomy      ROBOT-ASSISTED LAPAROSCOPIC ABDOMINAL SACROCOLPOPEXY USING DA DANETTE XI N/A 11/9/2021    Procedure: XI ROBOTIC SACROCOLPOPEXY, ABDOMINAL;  Surgeon: Reza Napoles MD;  Location: [facility] OR;  Service: OB/GYN;  Laterality: N/A;    ROBOT-ASSISTED LAPAROSCOPIC SALPINGO-OOPHORECTOMY USING DA DANETTE XI Bilateral 11/9/2021    Procedure: XI ROBOTIC SALPINGO-OOPHORECTOMY;  Surgeon: Reza Napoles MD;  Location: [facility] OR;  Service: OB/GYN;  Laterality: Bilateral;    ROBOT-ASSISTED SUBTOTAL HYSTERECTOMY N/A 11/9/2021    Procedure: ROBOTIC HYSTERECTOMY, SUPRACERVICAL;  Surgeon: Reza Napoles MD;  Location: [facility] OR;  Service: OB/GYN;  Laterality: N/A;    TOTAL REPLACEMENT OF BOTH HIP JOINTS USING COMPUTER-ASSISTED NAVIGATION       History reviewed. No pertinent family history.  Vitals:    09/28/23 1405   Weight: 83.5 kg (184 lb 1.4 oz)   Height: 5' 3" (1.6 m)       Review of Systems   Constitutional:  Negative for chills, diaphoresis, fatigue, fever and unexpected weight change.   HENT:  Negative for trouble swallowing.    Eyes:  Negative for visual disturbance.   Respiratory:  Negative for shortness of breath.    Cardiovascular:  Negative for chest pain.   Gastrointestinal:  Negative for abdominal pain, constipation, nausea and vomiting.   Genitourinary:  Negative for difficulty urinating.   Musculoskeletal:  Negative for arthralgias, back pain, gait problem, joint swelling, myalgias, neck pain and neck stiffness. "   Neurological:  Negative for dizziness, speech difficulty, weakness, light-headedness, numbness and headaches.          Objective:      Physical Exam  Neurological:      Mental Status: She is alert and oriented to person, place, and time.      Comments: She is awake and in no acute distress  No point tenderness or palpable masses about the lumbar spine  She has tenderness to palpation over the right greater trochanter  Reflexes- +1-+2 reflexes at the following:   C5-Biceps   C6-Brachioradialis   C7-Triceps   L3/4-Patellar   S1-Achilles   Sd sign negative bilaterally  Strength testing- 5/5 strength in the following muscle groups:  C5-Elbow flexion  C6-Wrist extension  C7-Elbow extension  C8-Finger flexion  T1-Finger abduction  L2-Hip flexion  L3-Knee extension  L4-Ankle dorsiflexion  L5-Great toe extension  S1-Ankle plantar flexion                    Assessment:       1. Chronic bilateral low back pain, unspecified whether sciatica present    2. Degenerative disc disease, lumbar    3. Trochanteric bursitis of right hip           Plan:     She has a nonfocal neurological examination and no historical red flags.  Her primary complaint is right trochanteric bursitis.  She has a relationship with Dr. Nj so I will refer her there to consider an injection.  We will get her scheduled for transforaminal injections on the right at L4-5 and L5-S1.  If she feels good after she gets her hip injection she can cancel that procedure.  Otherwise she can follow up with me afterwards      Chronic bilateral low back pain, unspecified whether sciatica present    Degenerative disc disease, lumbar  -     Ambulatory referral/consult to [facility]    Trochanteric bursitis of right hip  -     Ambulatory referral/consult to Orthopedics; Future; Expected date: 10/05/2023

## 2023-09-28 NOTE — TELEPHONE ENCOUNTER
2023    Alo Brown (:  1953) is a 71 y.o. male, here for a preventive medicine evaluation. Constipation  Hard stool, no fhx of colon cancer, no tarry stool, no abdominal pain, eats varieties of foods, no hc of UC, nor of Crohn's Dz, compliant with stool       Pt also had low GFR and elevated crt, pt states that he urinating fine, does see nephrolgoist, recently his Diovan was discont and awaiting to be re-seen for further lab testing,      No burning sensation, having now back pain, pt is currently on no NSAID's    Est, Glom Filt Rate 46 Low       HTN   pt also present for Bp check , compliant w/ the bp meds, was jamila Diovan and bumex  currently on hydralazine 50mg daily, having a low salt diet, an  active life style,   today the pt denies Chest Pain, has ++ legs swelling no lightheadedness,            the pat has not been feeling anxious, and  Has not been feeling stressed out, otherwise feeling better since the last visit       Patient Active Problem List   Diagnosis    Annual physical exam    Gout    Insomnia    Esophageal reflux    Morbid obesity ([address])    Peripheral vascular disease ([address])    Mixed hyperlipidemia    JONO (obstructive sleep apnea)    Diabetes mellitus type 2, diet-controlled ([address])    Metabolic syndrome    Type 2 diabetes mellitus with diabetic neuropathy (HCC)    Steatosis ([address])    Iron deficiency    Primary hypertension    History of pneumonia    GERD (gastroesophageal reflux disease)    Environmental allergies    Dyspnea    Allergic rhinitis    Chronic renal disease, stage III (HCC)       Review of Systems      Constitutional: no chills and fever,  , nad     HENT: no ear pain or nosebleeds. No blurred vision  Respiratory: no shortness of breath, wheezing cough   Cardiovascular: Has no chest pain, ,and racing heart . Gastrointestinal: No constipation, diarrhea, nausea and vomiting. Genitourinary: No frequency. Musculoskeletal: Negative for joint pain.    Skin: no attempted to contact patient no answer, unable to leave a message.

## 2023-10-02 ENCOUNTER — OUTPATIENT CASE MANAGEMENT (OUTPATIENT)
Dept: ADMINISTRATIVE | Facility: OTHER | Age: 88
End: 2023-10-02
Payer: MEDICARE

## 2023-10-02 ENCOUNTER — OFFICE VISIT (OUTPATIENT)
Dept: ORTHOPEDICS | Facility: CLINIC | Age: 88
End: 2023-10-02
Payer: MEDICARE

## 2023-10-02 ENCOUNTER — HOSPITAL ENCOUNTER (OUTPATIENT)
Dept: RADIOLOGY | Facility: HOSPITAL | Age: 88
Discharge: HOME | End: 2023-10-02
Attending: ORTHOPAEDIC SURGERY
Payer: MEDICARE

## 2023-10-02 VITALS — HEIGHT: 63 IN | OXYGEN SATURATION: 99 % | BODY MASS INDEX: 32.6 KG/M2 | HEART RATE: 74 BPM | WEIGHT: 184 LBS

## 2023-10-02 DIAGNOSIS — M25.551 RIGHT HIP PAIN: ICD-10-CM

## 2023-10-02 DIAGNOSIS — M70.61 TROCHANTERIC BURSITIS OF RIGHT HIP: ICD-10-CM

## 2023-10-02 PROCEDURE — 99214 OFFICE O/P EST MOD 30 MIN: CPT | Mod: PBBFAC,PN,25 | Performed by: ORTHOPAEDIC SURGERY

## 2023-10-02 PROCEDURE — 99999PBSHW PR PBB SHADOW TECHNICAL ONLY FILED TO HB: ICD-10-PCS | Mod: PBBFAC,,,

## 2023-10-02 PROCEDURE — 20610 LARGE JOINT ASPIRATION/INJECTION: R KNEE: ICD-10-PCS | Mod: S$PBB,RT,, | Performed by: ORTHOPAEDIC SURGERY

## 2023-10-02 PROCEDURE — 99999 PR PBB SHADOW E&M-EST. PATIENT-LVL IV: CPT | Mod: PBBFAC,,, | Performed by: ORTHOPAEDIC SURGERY

## 2023-10-02 PROCEDURE — 73502 XR PELVIS 3 VIEW INC HIP 2 VIEW RIGHT: ICD-10-PCS | Mod: 26,RT,, | Performed by: RADIOLOGY

## 2023-10-02 PROCEDURE — 99999PBSHW PR PBB SHADOW TECHNICAL ONLY FILED TO HB: Mod: PBBFAC,,,

## 2023-10-02 PROCEDURE — 99214 OFFICE O/P EST MOD 30 MIN: CPT | Mod: S$PBB,25,, | Performed by: ORTHOPAEDIC SURGERY

## 2023-10-02 PROCEDURE — 73502 X-RAY EXAM HIP UNI 2-3 VIEWS: CPT | Mod: 26,RT,, | Performed by: RADIOLOGY

## 2023-10-02 PROCEDURE — 99214 PR OFFICE/OUTPT VISIT, EST, LEVL IV, 30-39 MIN: ICD-10-PCS | Mod: S$PBB,25,, | Performed by: ORTHOPAEDIC SURGERY

## 2023-10-02 PROCEDURE — 73502 X-RAY EXAM HIP UNI 2-3 VIEWS: CPT | Mod: TC,PN,RT

## 2023-10-02 PROCEDURE — 99999 PR PBB SHADOW E&M-EST. PATIENT-LVL IV: ICD-10-PCS | Mod: PBBFAC,,, | Performed by: ORTHOPAEDIC SURGERY

## 2023-10-02 PROCEDURE — 20610 DRAIN/INJ JOINT/BURSA W/O US: CPT | Mod: PBBFAC,PN,RT | Performed by: ORTHOPAEDIC SURGERY

## 2023-10-02 RX ORDER — TRIAMCINOLONE ACETONIDE 40 MG/ML
40 INJECTION, SUSPENSION INTRA-ARTICULAR; INTRAMUSCULAR
Status: DISCONTINUED | OUTPATIENT
Start: 2023-10-02 | End: 2023-10-02 | Stop reason: HOSPADM

## 2023-10-02 RX ADMIN — TRIAMCINOLONE ACETONIDE 40 MG: 40 INJECTION, SUSPENSION INTRA-ARTICULAR; INTRAMUSCULAR at 11:10

## 2023-10-02 NOTE — PROGRESS NOTES
Subjective:      Patient ID: Mahsa Pedroza is a 88 y.o. female.    Chief Complaint: Pain of the Right Hip    HPI  Patient is in with a new complaint ongoing right lower back hip and lower extremity pain.  She underwent what she felt like was a successful right hip replacement approximately 14 years ago.  There has been no recent trauma.  Has pain that radiates from her lower back into her hip and buttocks.  Denies any bowel or bladder symptoms numbness or tingling.  She has recently seen Dr. Nava and tentatively has her scheduled for spinal injections.  ROS      Objective:    Ortho Exam       Constitutional:   Patient is alert  and oriented in no acute distress  HEENT:  normocephalic atraumatic; PERRL EOMI  Neck:  Supple without adenopathy  Cardiovascular:  Normal rate and rhythm  Pulmonary:  Normal respiratory effort normal chest wall expansion  Abdominal:  Nonprotuberant nondistended  Musculoskeletal:  Moderate tenderness noted over the SI joint lower back and right greater trochanter adequate hip range of motion without discomfort  No significant low back tenderness.  Adequate motor strength distally no leg length discrepancy.  Normal distal neurologic and vascular examination.  Neurological:  No focal defect; cranial nerves 2-12 grossly intact  Psychiatric/behavioral:  Mood and behavior normal    X-Ray Pelvis 3 view inc Hip 2 view Right  Narrative: EXAMINATION:  XR PELVIS 3 VIEW INC HIP 2 VIEW RIGHT    CLINICAL HISTORY:  Pain in right hip    TECHNIQUE:  AP pelvis and two views of the right hip.    COMPARISON:  12/23/2019.    FINDINGS:  No acute fracture or dislocation.  No significant soft tissue swelling.    Previous total right hip arthroplasty.  Normal alignment.  No plain film evidence to suggest hardware failure.  Prior total left hip arthroplasty.    Pubic symphysis is intact.  SI joints are intact.  Bilateral pubic rami are intact.  Impression: Prior right hip arthroplasty in  alignment.    Electronically signed by: Reagan Guanakito  Date:    10/02/2023  Time:    11:17       My Radiographs Findings:    I have personally reviewed radiographs and concur with above findings    Assessment:       Encounter Diagnosis   Name Primary?    Trochanteric bursitis of right hip          Plan:       I have discussed medical condition treatment options with her at length.  I have explained that she has multifactorial pain in the I certainly can inject her hip bursa today but that would likely only give her a partial and incomplete relief in her pain but it maybe enough to make her comfortable.  After a verbal consent and sterile prep I injected over the point of maximum tenderness without complication with a combination of cc of Kenalog several cc of lidocaine.  If symptoms fail to improve over the next 4-6 weeks and I would suggest that she consider having Dr. Sergo Nava proceed with his injections follow up can be as needed.        Past Medical History:   Diagnosis Date    Arthritis     Cataracts, bilateral     Osteopenia     Thyroid disease      Past Surgical History:   Procedure Laterality Date    CHOLECYSTECTOMY      DILATION AND CURETTAGE OF UTERUS      EPIDURAL STEROID INJECTION N/A 12/23/2020    Procedure: RACHAEL Caudal;  Surgeon: Jim Ugalde MD;  Location: [facility] OR;  Service: Pain Management;  Laterality: N/A;    EYE SURGERY      GALLBLADDER SURGERY      HYSTERECTOMY      HYSTEROSCOPIC SURGICAL PROCEDURE N/A 5/27/2019    Procedure: HYSTEROSCOPY, THERAPEUTIC;  Surgeon: Gordon Cedillo MD;  Location: [facility] OR;  Service: OB/GYN;  Laterality: N/A;  mysoure    HYSTEROSCOPY WITH DILATION AND CURETTAGE OF UTERUS N/A 5/27/2019    Procedure: HYSTEROSCOPY, WITH DILATION AND CURETTAGE OF UTERUS;  Surgeon: Gordon Cedillo MD;  Location: [facility] OR;  Service: OB/GYN;  Laterality: N/A;    JOINT REPLACEMENT Bilateral     Hips    lamenectomy      ROBOT-ASSISTED LAPAROSCOPIC ABDOMINAL SACROCOLPOPEXY USING  DA DANETTE XI N/A 11/9/2021    Procedure: XI ROBOTIC SACROCOLPOPEXY, ABDOMINAL;  Surgeon: Reza Napoles MD;  Location: [facility] OR;  Service: OB/GYN;  Laterality: N/A;    ROBOT-ASSISTED LAPAROSCOPIC SALPINGO-OOPHORECTOMY USING DA DANETTE XI Bilateral 11/9/2021    Procedure: XI ROBOTIC SALPINGO-OOPHORECTOMY;  Surgeon: Reza Napoles MD;  Location: [facility] OR;  Service: OB/GYN;  Laterality: Bilateral;    ROBOT-ASSISTED SUBTOTAL HYSTERECTOMY N/A 11/9/2021    Procedure: ROBOTIC HYSTERECTOMY, SUPRACERVICAL;  Surgeon: Reza Napoles MD;  Location: [facility] OR;  Service: OB/GYN;  Laterality: N/A;    TOTAL REPLACEMENT OF BOTH HIP JOINTS USING COMPUTER-ASSISTED NAVIGATION           Current Outpatient Medications:     amLODIPine (NORVASC) 5 MG tablet, Take 1 tablet (5 mg total) by mouth once daily., Disp: 30 tablet, Rfl: 11    aspirin (ECOTRIN) 81 MG EC tablet, Take 81 mg by mouth once daily., Disp: , Rfl:     atorvastatin (LIPITOR) 20 MG tablet, Take 1 tablet (20 mg total) by mouth once daily., Disp: 90 tablet, Rfl: 3    chlorhexidine (PERIDEX) 0.12 % solution, SMARTSIG:By Mouth, Disp: , Rfl:     diclofenac sodium (VOLTAREN) 1 % Gel, Apply 2 g topically 4 (four) times daily as needed., Disp: 100 g, Rfl: 1    estradioL (ESTRACE) 0.01 % (0.1 mg/gram) vaginal cream, USE 1 GRAM VAGINALLY EVERY NIGHT FOR 4 WEEKS THEN USE VAGINALLY 3 TIMES EVERY WEEK, Disp: 42.5 g, Rfl: 1    fluticasone propionate (FLOVENT DISKUS) 50 mcg/actuation DsDv, Inhale into the lungs. Controller, Disp: , Rfl:     HYDROcodone-acetaminophen (NORCO) 7.5-325 mg per tablet, Take 1 tablet by mouth every 12 (twelve) hours as needed for Pain., Disp: 30 tablet, Rfl: 0    hydrOXYzine HCL (ATARAX) 25 MG tablet, Take 1 tablet (25 mg total) by mouth 3 (three) times daily as needed for Anxiety., Disp: 90 tablet, Rfl: 1    L.acidophil-L.plantar-Bifido 7 (UP4 PROBIOTICS ADULT) 15 billion cell Cap, Take 1 tablet by mouth once daily., Disp: , Rfl:     levothyroxine (SYNTHROID) 50  MCG tablet, TAKE 1 TABLET(50 MCG) BY MOUTH BEFORE BREAKFAST, Disp: 90 tablet, Rfl: 3    losartan (COZAAR) 50 MG tablet, Take 1 tablet (50 mg total) by mouth once daily., Disp: 90 tablet, Rfl: 3    tiZANidine (ZANAFLEX) 2 MG tablet, Take 2 tablets (4 mg total) by mouth every 8 (eight) hours as needed., Disp: 30 tablet, Rfl: 0    cloNIDine (CATAPRES) 0.1 MG tablet, Take 1 tablet (0.1 mg total) by mouth daily as needed (SBP >160, DBP >100)., Disp: 30 tablet, Rfl: 5  No current facility-administered medications for this visit.    Facility-Administered Medications Ordered in Other Visits:     lactated ringers infusion, , Intravenous, Once PRN, Jim Ugalde MD    Review of patient's allergies indicates:   Allergen Reactions    Adhesive Rash    Morphine     Oxymetazoline hcl Rash    Sulfa (sulfonamide antibiotics) Nausea And Vomiting    Xylometazoline hcl        History reviewed. No pertinent family history.  Social History     Occupational History    Not on file   Tobacco Use    Smoking status: Never    Smokeless tobacco: Never   Substance and Sexual Activity    Alcohol use: Not Currently    Drug use: Never    Sexual activity: Not Currently     Birth control/protection: See Surgical Hx

## 2023-10-02 NOTE — PROCEDURES
Large Joint Aspiration/Injection: R knee    Date/Time: 10/2/2023 11:00 AM    Performed by: Cesar Nj MD  Authorized by: Cesar Nj MD    Consent Done?:  Yes (Verbal)  Indications:  Pain  Site marked: the procedure site was marked    Timeout: prior to procedure the correct patient, procedure, and site was verified    Local anesthetic: Ropivicaine.  Anesthetic total (ml):  3      Details:  Needle Size:  20 G  Approach:  Anterolateral  Location:  Knee  Site:  R knee  Medications:  40 mg triamcinolone acetonide 40 mg/mL  Patient tolerance:  Patient tolerated the procedure well with no immediate complications

## 2023-10-05 DIAGNOSIS — M51.36 DEGENERATIVE DISC DISEASE, LUMBAR: ICD-10-CM

## 2023-10-06 ENCOUNTER — PATIENT MESSAGE (OUTPATIENT)
Dept: FAMILY MEDICINE | Facility: CLINIC | Age: 88
End: 2023-10-06
Payer: MEDICARE

## 2023-10-07 RX ORDER — HYDROCODONE BITARTRATE AND ACETAMINOPHEN 7.5; 325 MG/1; MG/1
1 TABLET ORAL EVERY 12 HOURS PRN
Qty: 30 TABLET | Refills: 0 | Status: SHIPPED | OUTPATIENT
Start: 2023-10-07 | End: 2023-10-25 | Stop reason: SDUPTHER

## 2023-10-07 NOTE — TELEPHONE ENCOUNTER
This has been completed   Nasal Turnover Hinge Flap Text: The defect edges were debeveled with a #15 scalpel blade.  Given the size, depth, location of the defect and the defect being full thickness a nasal turnover hinge flap was deemed most appropriate.  Using a sterile surgical marker, an appropriate hinge flap was drawn incorporating the defect. The area thus outlined was incised with a #15 scalpel blade. The flap was designed to recreate the nasal mucosal lining and the alar rim. The skin margins were undermined to an appropriate distance in all directions utilizing iris scissors.

## 2023-10-09 ENCOUNTER — TELEPHONE (OUTPATIENT)
Dept: FAMILY MEDICINE | Facility: CLINIC | Age: 88
End: 2023-10-09
Payer: MEDICARE

## 2023-10-09 NOTE — TELEPHONE ENCOUNTER
----- Message from Katie Choi sent at 10/9/2023  9:14 AM CDT -----  Regarding: appointment  Contact: patient  Type:  Sooner Appointment Request    Caller is requesting a sooner appointment.  Caller declined first available appointment listed below.  Caller will not accept being placed on the waitlist and is requesting a message be sent to doctor.    Name of Caller:  patient  When is the first available appointment?    Symptoms:  get established  Best Call Back Number:  [phone] (home)     Additional Information:  Patient would like to start seeing Dr. Casanova.  Please call patient to schedule.  Thanks!       9/22/22 in chart

## 2023-10-09 NOTE — TELEPHONE ENCOUNTER
----- Message from Lalo Anaya sent at 10/9/2023  8:50 AM CDT -----  Type: Needs Medical Advice  Who Called:  Patient    Best Call Back Number: [phone]  Additional Information: Patient states that she would like a callback regarding the coding of her bills.

## 2023-10-12 ENCOUNTER — OUTPATIENT CASE MANAGEMENT (OUTPATIENT)
Dept: ADMINISTRATIVE | Facility: OTHER | Age: 88
End: 2023-10-12
Payer: MEDICARE

## 2023-10-12 NOTE — PROGRESS NOTES
10/12/2023  1st attempt to complete Follow-Up  for Outpatient Care Management, left message.  Will send letter thru [facility] portal.

## 2023-10-12 NOTE — LETTER
Mahsa Pedroza  [address] [place] [address]      Dear Mahsa Pedroza,     I am your nurse with [facility]s Outpatient Care Management Department. I have been unsuccessful at reaching you since we spoke on 09/28/2023.  At your earliest convenience, I would like to discuss your healthcare progress.      Please contact me at [phone] from 8:00AM to 4:30 PM on Monday thru Friday.     As you know, [facility] is a program offered to you through [facility] where a nurse is available 24/7 to answer questions or provide medical advice, their number is [phone].    Thanks,  Jodi Tanner RN [facility] [place]   Outpatient Care Management

## 2023-10-19 ENCOUNTER — OUTPATIENT CASE MANAGEMENT (OUTPATIENT)
Dept: ADMINISTRATIVE | Facility: OTHER | Age: 88
End: 2023-10-19
Payer: MEDICARE

## 2023-10-19 NOTE — PROGRESS NOTES
Outpatient Care Management  Plan of Care Follow Up Visit    Patient: Mahsa Pedroza  MRN: [id number]  Date of Service: 10/19/2023  Completed by: Jodi Tanner RN  Referral Date: 09/13/2023    No chief complaint on file.      Brief Summary: Pain management appt on 11/03/23 for steroid injection  Next Steps: follow up in three weeks

## 2023-10-23 ENCOUNTER — OUTPATIENT CASE MANAGEMENT (OUTPATIENT)
Dept: ADMINISTRATIVE | Facility: OTHER | Age: 88
End: 2023-10-23
Payer: MEDICARE

## 2023-10-23 NOTE — PROGRESS NOTES
10/23/23-Called her back after receiving a voice mail and she states that she took an anxiety pill. Her BP is better. She request follow up in two weeks.

## 2023-10-25 ENCOUNTER — OFFICE VISIT (OUTPATIENT)
Dept: FAMILY MEDICINE | Facility: CLINIC | Age: 88
End: 2023-10-25
Payer: MEDICARE

## 2023-10-25 VITALS
SYSTOLIC BLOOD PRESSURE: 128 MMHG | OXYGEN SATURATION: 97 % | RESPIRATION RATE: 18 BRPM | HEIGHT: 63 IN | BODY MASS INDEX: 31.93 KG/M2 | WEIGHT: 180.19 LBS | HEART RATE: 69 BPM | DIASTOLIC BLOOD PRESSURE: 78 MMHG | TEMPERATURE: 98 F

## 2023-10-25 DIAGNOSIS — M51.36 DEGENERATIVE DISC DISEASE, LUMBAR: ICD-10-CM

## 2023-10-25 PROCEDURE — 99213 PR OFFICE/OUTPT VISIT, EST, LEVL III, 20-29 MIN: ICD-10-PCS | Mod: S$PBB,,, | Performed by: FAMILY MEDICINE

## 2023-10-25 PROCEDURE — 99213 OFFICE O/P EST LOW 20 MIN: CPT | Mod: S$PBB,,, | Performed by: FAMILY MEDICINE

## 2023-10-25 PROCEDURE — 99214 OFFICE O/P EST MOD 30 MIN: CPT | Mod: PBBFAC | Performed by: FAMILY MEDICINE

## 2023-10-25 PROCEDURE — 99999 PR PBB SHADOW E&M-EST. PATIENT-LVL IV: ICD-10-PCS | Mod: PBBFAC,,, | Performed by: FAMILY MEDICINE

## 2023-10-25 PROCEDURE — 99999 PR PBB SHADOW E&M-EST. PATIENT-LVL IV: CPT | Mod: PBBFAC,,, | Performed by: FAMILY MEDICINE

## 2023-10-25 RX ORDER — HYDROCODONE BITARTRATE AND ACETAMINOPHEN 7.5; 325 MG/1; MG/1
1 TABLET ORAL EVERY 12 HOURS PRN
Qty: 30 TABLET | Refills: 0 | Status: SHIPPED | OUTPATIENT
Start: 2023-10-25 | End: 2023-11-15 | Stop reason: SDUPTHER

## 2023-10-25 NOTE — PROGRESS NOTES
[facility] - Clinic Note    Subjective      Ms. Pedroza is a 89 y.o. female who presents to clinic for Follow-up    Hip pain has improved after injection.    PM Mahsa has a past medical history of Arthritis, Cataracts, bilateral, Osteopenia, and Thyroid disease.   PSXH Mahsa has a past surgical history that includes Eye surgery; lamenectomy; Total replacement of both hip joints using computer-assisted navigation; Gallbladder surgery; Dilation and curettage of uterus; Hysteroscopy with dilation and curettage of uterus (N/A, 5/27/2019); Hysteroscopic surgical procedure (N/A, 5/27/2019); Cholecystectomy; Epidural steroid injection (N/A, 12/23/2020); Joint replacement (Bilateral); Robot-assisted subtotal hysterectomy (N/A, 11/9/2021); Robot-assisted laparoscopic abdominal sacrocolpopexy using da Lizet Xi (N/A, 11/9/2021); Robot-assisted laparoscopic salpingo-oophorectomy using da Lizet Xi (Bilateral, 11/9/2021); and Hysterectomy.    Mahsa's family history is not on file.   DANII Bragg reports that she has never smoked. She has never used smokeless tobacco. She reports that she does not currently use alcohol. She reports that she does not use drugs.   BRISA Bragg is allergic to adhesive, morphine, oxymetazoline hcl, sulfa (sulfonamide antibiotics), and xylometazoline hcl.   VAL Bragg has a current medication list which includes the following prescription(s): aspirin, atorvastatin, chlorhexidine, diclofenac sodium, estradiol, fluticasone propionate, hydroxyzine hcl, up4 probiotics adult, levothyroxine, losartan, amlodipine, clonidine, hydrocodone-acetaminophen, and tizanidine, and the following Facility-Administered Medications: lactated ringers.     Review of Systems   Constitutional: Negative for chills and fever.   Respiratory: Negative for shortness of breath.    Cardiovascular: Negative for chest pain.   Musculoskeletal: Positive for myalgias.     Objective     /78 (BP Location: Left arm, Patient  "Position: Sitting, BP Method: Large (Manual))   Pulse 69   Temp 97.8 °F (36.6 °C) (Temporal)   Resp 18   Ht 5' 3" (1.6 m)   Wt 81.7 kg (180 lb 3.2 oz)   SpO2 97%   BMI 31.92 kg/m²     Physical Exam  Vitals and nursing note reviewed.   Constitutional:       General: She is not in acute distress.     Appearance: Normal appearance. She is well-developed. She is not diaphoretic.      Comments: Ambulating with walker   HENT:      Head: Normocephalic and atraumatic.      Right Ear: External ear normal.      Left Ear: External ear normal.   Eyes:      General:         Right eye: No discharge.         Left eye: No discharge.   Cardiovascular:      Rate and Rhythm: Normal rate and regular rhythm.      Heart sounds: Normal heart sounds.   Pulmonary:      Effort: Pulmonary effort is normal.      Breath sounds: Normal breath sounds. No wheezing or rales.   Skin:     General: Skin is warm and dry.   Neurological:      Mental Status: She is alert and oriented to person, place, and time. Mental status is at baseline.   Psychiatric:         Mood and Affect: Mood normal.         Behavior: Behavior normal.         Thought Content: Thought content normal.         Judgment: Judgment normal.        Assessment/Plan     1. Degenerative disc disease, lumbar  HYDROcodone-acetaminophen (NORCO) 7.5-325 mg per tablet        Continue pain medication as needed as it improves patient's quality of life.   reviewed.  No red flags.  Keep follow-up with orthopedics.    1. Degenerative disc disease, lumbar  -     HYDROcodone-acetaminophen (NORCO) 7.5-325 mg per tablet; Take 1 tablet by mouth every 12 (twelve) hours as needed for Pain.  Dispense: 30 tablet; Refill: 0      Future Appointments   Date Time Provider Department [place]   12/6/2023 10:15 AM Gordon Cedillo MD [facility]   12/21/2023 10:00 AM Javier Mckeon MD [facility]           Braden Newby MD  Family Medicine  [facility] [place]

## 2023-10-27 ENCOUNTER — PATIENT MESSAGE (OUTPATIENT)
Dept: SURGERY | Facility: HOSPITAL | Age: 88
End: 2023-10-27

## 2023-10-27 ENCOUNTER — TELEPHONE (OUTPATIENT)
Dept: PAIN MEDICINE | Facility: CLINIC | Age: 88
End: 2023-10-27
Payer: MEDICARE

## 2023-10-27 NOTE — TELEPHONE ENCOUNTER
----- Message from Claudette Kramer sent at 10/27/2023  2:29 PM CDT -----  Type:  Needs Medical Advice    Who Called: pt   Best Call Back Number: [phone] (home)     Additional Information:  Requesting call back  sts she wants to cancel the sx appt 11/3 sts she no longer have the problem     please advise thank you

## 2023-11-06 ENCOUNTER — PATIENT MESSAGE (OUTPATIENT)
Dept: FAMILY MEDICINE | Facility: CLINIC | Age: 88
End: 2023-11-06
Payer: MEDICARE

## 2023-11-06 ENCOUNTER — NURSE TRIAGE (OUTPATIENT)
Dept: ADMINISTRATIVE | Facility: CLINIC | Age: 88
End: 2023-11-06
Payer: MEDICARE

## 2023-11-06 ENCOUNTER — TELEPHONE (OUTPATIENT)
Dept: FAMILY MEDICINE | Facility: CLINIC | Age: 88
End: 2023-11-06
Payer: MEDICARE

## 2023-11-06 NOTE — TELEPHONE ENCOUNTER
----- Message from Susi Simms sent at 11/6/2023  9:23 AM CST -----  Contact: Self  Type:  Needs Medical Advice    Who Called: Pt   Symptoms (please be specific): Pt states that her BP is elevated 219/98      Would the patient rather a call back or a response via [facility]? call  Best Call Back Number: [phone]    Additionally, the pt was put through to the Nurse on Call line...     Please call to advise... Thank you

## 2023-11-06 NOTE — TELEPHONE ENCOUNTER
This has been taken care of in another encounter, patient was enc to go to the ER but refuses, she has appointment with MONICA.tomorrow at 3:45 pm

## 2023-11-06 NOTE — TELEPHONE ENCOUNTER
"/98 feels shaky, cold and weak.  Pt states she did take "an extra clonidine"  Pt care advice is to go to ED now.  Patient verbally understands, all questions answered, advised to call back for any worsening symptoms or further needs.     Reason for Disposition   Systolic BP >= 160 OR Diastolic >= 100, and any cardiac (e.g., breathing difficulty, chest pain) or neurologic symptoms (e.g., new-onset blurred or double vision)    Additional Information   Negative: Sounds like a life-threatening emergency to the triager    Protocols used: Blood Pressure - High-A-OH    " Orders for admission, patient is aware of plan and ready to go upstairs. Any questions, please call ED RN KK at [phone]. Patient Covid vaccination status: Fully vaccinated     COVID Test Ordered in ED: None    COVID Suspicion at Admission: N/A    Running Infusions:  None    Mental Status/LOC at time of transport:  AxOx4    Other pertinent information: fall risk   CIWA score: N/A   NIH score:  N/A

## 2023-11-07 ENCOUNTER — OFFICE VISIT (OUTPATIENT)
Dept: FAMILY MEDICINE | Facility: CLINIC | Age: 88
End: 2023-11-07
Payer: MEDICARE

## 2023-11-07 ENCOUNTER — HOSPITAL ENCOUNTER (OUTPATIENT)
Dept: CARDIOLOGY | Facility: HOSPITAL | Age: 88
Discharge: HOME | End: 2023-11-07
Attending: STUDENT IN AN ORGANIZED HEALTH CARE EDUCATION/TRAINING PROGRAM
Payer: MEDICARE

## 2023-11-07 VITALS
HEART RATE: 59 BPM | WEIGHT: 179.81 LBS | DIASTOLIC BLOOD PRESSURE: 78 MMHG | RESPIRATION RATE: 20 BRPM | HEIGHT: 63 IN | SYSTOLIC BLOOD PRESSURE: 148 MMHG | OXYGEN SATURATION: 98 % | BODY MASS INDEX: 31.86 KG/M2

## 2023-11-07 DIAGNOSIS — F41.9 ANXIETY: ICD-10-CM

## 2023-11-07 DIAGNOSIS — I10 ESSENTIAL HYPERTENSION: ICD-10-CM

## 2023-11-07 DIAGNOSIS — Z79.891 OPIOID USE AGREEMENT EXISTS: ICD-10-CM

## 2023-11-07 DIAGNOSIS — I10 ESSENTIAL HYPERTENSION: Primary | ICD-10-CM

## 2023-11-07 PROCEDURE — 99213 PR OFFICE/OUTPT VISIT, EST, LEVL III, 20-29 MIN: ICD-10-PCS | Mod: S$PBB,,, | Performed by: STUDENT IN AN ORGANIZED HEALTH CARE EDUCATION/TRAINING PROGRAM

## 2023-11-07 PROCEDURE — 93005 ELECTROCARDIOGRAM TRACING: CPT

## 2023-11-07 PROCEDURE — 99999 PR PBB SHADOW E&M-EST. PATIENT-LVL V: ICD-10-PCS | Mod: PBBFAC,,, | Performed by: STUDENT IN AN ORGANIZED HEALTH CARE EDUCATION/TRAINING PROGRAM

## 2023-11-07 PROCEDURE — 99213 OFFICE O/P EST LOW 20 MIN: CPT | Mod: S$PBB,,, | Performed by: STUDENT IN AN ORGANIZED HEALTH CARE EDUCATION/TRAINING PROGRAM

## 2023-11-07 PROCEDURE — 99215 OFFICE O/P EST HI 40 MIN: CPT | Mod: PBBFAC | Performed by: STUDENT IN AN ORGANIZED HEALTH CARE EDUCATION/TRAINING PROGRAM

## 2023-11-07 PROCEDURE — 93010 ELECTROCARDIOGRAM REPORT: CPT | Mod: ,,, | Performed by: INTERNAL MEDICINE

## 2023-11-07 PROCEDURE — 99999 PR PBB SHADOW E&M-EST. PATIENT-LVL V: CPT | Mod: PBBFAC,,, | Performed by: STUDENT IN AN ORGANIZED HEALTH CARE EDUCATION/TRAINING PROGRAM

## 2023-11-07 PROCEDURE — 93010 EKG 12-LEAD: ICD-10-PCS | Mod: ,,, | Performed by: INTERNAL MEDICINE

## 2023-11-07 RX ORDER — ESCITALOPRAM OXALATE 5 MG/1
5 TABLET ORAL DAILY
Qty: 30 TABLET | Refills: 11 | Status: SHIPPED | OUTPATIENT
Start: 2023-11-07 | End: 2023-12-07 | Stop reason: SDUPTHER

## 2023-11-07 RX ORDER — AMLODIPINE BESYLATE 10 MG/1
10 TABLET ORAL DAILY
Qty: 30 TABLET | Refills: 11 | Status: SHIPPED | OUTPATIENT
Start: 2023-11-07 | End: 2024-03-12

## 2023-11-07 RX ORDER — LOSARTAN POTASSIUM AND HYDROCHLOROTHIAZIDE 12.5; 5 MG/1; MG/1
1 TABLET ORAL DAILY
Qty: 90 TABLET | Refills: 3 | Status: SHIPPED | OUTPATIENT
Start: 2023-11-07 | End: 2024-11-06

## 2023-11-07 NOTE — PROGRESS NOTES
[facility] Note    Subjective:    The HPI and pertinent ROS is included in the Diagnostic Impression Remarks section at the end of the note. Please see below for further details. Chief complaint is at end of note.     Mahsa is a pleasant intelligent patient who is here for evaluation.     Modified Medications    No medications on file       Data reviewed 274}  Previous medical records reviewed and summarized in plan section at end of note.      If you are due for any health screening(s) below please notify me so we can arrange them to be ordered and scheduled. Most healthy patients at your age complete them, but you are free to accept or refuse. If you can't do it, I'll definitely understand. If you can, I'd certainly appreciate it!     All of your core healthy metrics are met.      The following portions of the patient's history were reviewed and updated as appropriate: allergies, current medications, past family history, past medical history, past social history, past surgical history and problem list.    She  has a past medical history of Arthritis, Cataracts, bilateral, Osteopenia, and Thyroid disease.  She  has a past surgical history that includes Eye surgery; lamenectomy; Total replacement of both hip joints using computer-assisted navigation; Gallbladder surgery; Dilation and curettage of uterus; Hysteroscopy with dilation and curettage of uterus (N/A, 5/27/2019); Hysteroscopic surgical procedure (N/A, 5/27/2019); Cholecystectomy; Epidural steroid injection (N/A, 12/23/2020); Joint replacement (Bilateral); Robot-assisted subtotal hysterectomy (N/A, 11/9/2021); Robot-assisted laparoscopic abdominal sacrocolpopexy using da Lizet Xi (N/A, 11/9/2021); Robot-assisted laparoscopic salpingo-oophorectomy using da Lizet Xi (Bilateral, 11/9/2021); and Hysterectomy.    She  reports that she has never smoked. She has never used smokeless tobacco. She reports that she does not currently use alcohol.  "She reports that she does not use drugs.  She family history is not on file.    Review of patient's allergies indicates:   Allergen Reactions    Adhesive Rash    Morphine     Oxymetazoline hcl Rash    Sulfa (sulfonamide antibiotics) Nausea And Vomiting    Xylometazoline hcl        Tobacco Use: Low Risk  (11/7/2023)    Patient History     Smoking Tobacco Use: Never     Smokeless Tobacco Use: Never     Passive Exposure: Not on file     Physical Examination  General appearance: alert, cooperative, no distress  Neck: no thyromegaly, no neck stiffness  Lungs: clear to auscultation, no wheezes, rales or rhonchi, symmetric air entry  Heart: normal rate, regular rhythm, normal S1, S2, no murmurs, rubs, clicks or gallops  Abdomen: soft, nontender, nondistended, no rigidity, rebound, or guarding.   Back: no point tenderness over spine  Extremities: peripheral pulses normal, no unilateral leg swelling or calf tenderness   Neurological:alert, oriented, normal speech, no new focal findings or movement disorder noted from baseline    BP Readings from Last 3 Encounters:   11/07/23 (!) 148/78   10/25/23 128/78   09/19/23 118/72     Wt Readings from Last 3 Encounters:   11/07/23 81.6 kg (179 lb 12.8 oz)   10/25/23 81.7 kg (180 lb 3.2 oz)   10/02/23 83.5 kg (184 lb)     BP (!) 148/78 (BP Location: Right arm, Patient Position: Sitting, BP Method: Large (Manual))   Pulse (!) 59   Resp 20   Ht 5' 3" (1.6 m)   Wt 81.6 kg (179 lb 12.8 oz)   SpO2 98%   BMI 31.85 kg/m²    274}  Laboratory: I have reviewed old labs below:    274}    Lab Results   Component Value Date    WBC 7.29 07/08/2023    HGB 12.2 07/08/2023    HCT 36.2 (L) 07/08/2023    MCV 87 07/08/2023     07/08/2023     (L) 07/08/2023    K 4.6 07/08/2023    CL 99 07/08/2023    CALCIUM 9.1 07/08/2023    CO2 22 (L) 07/08/2023    GLU 97 07/08/2023    BUN 14 07/08/2023    CREATININE 0.8 07/08/2023    EGFRNORACEVR >60.0 07/08/2023    ANIONGAP 8 07/08/2023    PROT 6.8 " "07/08/2023    ALBUMIN 4.0 07/08/2023    BILITOT 0.7 07/08/2023    ALKPHOS 55 07/08/2023    ALT 20 07/08/2023    AST 24 07/08/2023    INR 1.0 03/15/2020    CHOL 237 (H) 12/29/2022    TRIG 74 12/29/2022    HDL 74 12/29/2022    LDLCALC 148.2 12/29/2022    TSH 1.715 12/29/2022    HGBA1C 5.7 03/26/2021      Lab reviewed by me: Particular labs of significance that I will monitor, workup, or treat to improve are mentioned below in diagnostic impression remarks.    Imaging/EKG: I have reviewed the pertinent results and my findings are noted in remarks.  274}    CC:   Chief Complaint   Patient presents with    Hypertension     BP high, sharp pain in upper hip left side radiates around to side as well.         274}    Assessment/Plan  Mahsa Pedroza is a 89 y.o. female who presents to clinic with:  1. Essential hypertension    2. Opioid use agreement exists    3. Anxiety       274}  Diagnostic Impression Remarks + HPI     Documentation entered by me for this encounter may have been done in part using speech-recognition technology. Although I have made an effort to ensure accuracy, "sound like" errors may exist and should be interpreted in context.         This is the extent of this pleasant patient's concerns at this present time. She did not feel chest pain upon exertion, dyspnea, nausea, vomiting, diaphoresis, or syncope. No pleuritic chest pain, unilateral leg swelling, calf tenderness, or calf pain. Negative for unintentional weight loss night sweats, hematuria, and fevers. Mahsa will return to clinic in a few months for further workup and reassessment or sooner as needed. She was instructed to call the clinic or go to the emergency department or urgent care immediately if her symptoms do not improve, worsens, or if any new symptoms develop. As we discussed that symptoms could worsen over the next 24 hours she was advised that if any increased swelling, pain, or numbness arise to go immediately to the ED. Patient " knows to call any time if an emergency arises. Shared decision making occurred and she verbalized understanding in agreement with this plan. I discussed imaging findings, diagnosis, possibilities, treatment options, medications, risks, and benefits. She had many questions regarding the options and long-term effects. All questions were answered. She expressed understanding after counseling regarding the diagnosis and recommendations. She was capable and demonstrated competence with understanding of these options. Shared decision making was performed resulting in her choosing the current treatment plan. Patient handout was given with instructions and recommendations. Advised the patient that if they become pregnant to alert us immediately to assess for medication changes. Having a healthy weight can decrease the risk of 13 cancers and is an important goal. I also discussed the importance of close follow up to discuss labs, change or modify her medications if needed, monitor side effects, and further evaluation of medical problems.     Additional workup planned: see labs ordered below.    See below for labs and meds ordered with associated diagnosis      1. Essential hypertension  - SCHEDULED EKG 12-LEAD (to [facility]); Future  - losartan-hydrochlorothiazide 50-12.5 mg (HYZAAR) 50-12.5 mg per tablet; Take 1 tablet by mouth once daily.  Dispense: 90 tablet; Refill: 3  - amLODIPine (NORVASC) 10 MG tablet; Take 1 tablet (10 mg total) by mouth once daily.  Dispense: 30 tablet; Refill: 11    2. Opioid use agreement exists  - Ambulatory referral/consult to Pain Clinic; Future    3. Anxiety  - EScitalopram oxalate (LEXAPRO) 5 MG Tab; Take 1 tablet (5 mg total) by mouth once daily.  Dispense: 30 tablet; Refill: 11      Javier Mckeon MD   274}    If you are due for any health screening(s) below please notify me so we can arrange them to be ordered and scheduled. Most healthy patients at your age complete them, but you are free to  accept or refuse.     If you can't do it, I'll definitely understand. If you can, I'd certainly appreciate it!   All of your core healthy metrics are met.

## 2023-11-08 ENCOUNTER — OUTPATIENT CASE MANAGEMENT (OUTPATIENT)
Dept: ADMINISTRATIVE | Facility: OTHER | Age: 88
End: 2023-11-08
Payer: MEDICARE

## 2023-11-08 NOTE — PROGRESS NOTES
Outpatient Care Management  Plan of Care Follow Up Visit    Patient: Mahsa Pedroza  MRN: [id number]  Date of Service: 11/08/2023  Completed by: Jodi Tanner RN  Referral Date: 09/13/2023    Reason for Visit   Patient presents with    OPCM RN Follow Up Call       Brief Summary: Appt with her PCP on 11/07/23.   Next Steps:  Patient agrees to follow up call with in 3 weeks on or around 11/27/23  Review BP and pain   She will use ice or heat for pain

## 2023-11-09 ENCOUNTER — PATIENT MESSAGE (OUTPATIENT)
Dept: FAMILY MEDICINE | Facility: CLINIC | Age: 88
End: 2023-11-09
Payer: MEDICARE

## 2023-11-10 ENCOUNTER — HOSPITAL ENCOUNTER (EMERGENCY)
Facility: HOSPITAL | Age: 88
Discharge: HOME | End: 2023-11-10
Attending: EMERGENCY MEDICINE
Payer: MEDICARE

## 2023-11-10 ENCOUNTER — NURSE TRIAGE (OUTPATIENT)
Dept: ADMINISTRATIVE | Facility: CLINIC | Age: 88
End: 2023-11-10
Payer: MEDICARE

## 2023-11-10 VITALS
TEMPERATURE: 98 F | DIASTOLIC BLOOD PRESSURE: 70 MMHG | BODY MASS INDEX: 31.71 KG/M2 | RESPIRATION RATE: 18 BRPM | OXYGEN SATURATION: 98 % | SYSTOLIC BLOOD PRESSURE: 167 MMHG | HEIGHT: 63 IN | WEIGHT: 179 LBS | HEART RATE: 70 BPM

## 2023-11-10 DIAGNOSIS — I10 HYPERTENSION, UNSPECIFIED TYPE: ICD-10-CM

## 2023-11-10 DIAGNOSIS — F41.9 ANXIETY: Primary | ICD-10-CM

## 2023-11-10 LAB
ALBUMIN SERPL BCP-MCNC: 4.2 G/DL (ref 3.5–5.2)
ALP SERPL-CCNC: 58 U/L (ref 55–135)
ALT SERPL W/O P-5'-P-CCNC: 20 U/L (ref 10–44)
ANION GAP SERPL CALC-SCNC: 11 MMOL/L (ref 8–16)
AST SERPL-CCNC: 24 U/L (ref 10–40)
BASOPHILS # BLD AUTO: 0.04 K/UL (ref 0–0.2)
BASOPHILS NFR BLD: 0.6 % (ref 0–1.9)
BILIRUB SERPL-MCNC: 0.7 MG/DL (ref 0.1–1)
BUN SERPL-MCNC: 10 MG/DL (ref 8–23)
CALCIUM SERPL-MCNC: 9.2 MG/DL (ref 8.7–10.5)
CHLORIDE SERPL-SCNC: 95 MMOL/L (ref 95–110)
CO2 SERPL-SCNC: 23 MMOL/L (ref 23–29)
CREAT SERPL-MCNC: 0.7 MG/DL (ref 0.5–1.4)
DIFFERENTIAL METHOD: ABNORMAL
EOSINOPHIL # BLD AUTO: 0 K/UL (ref 0–0.5)
EOSINOPHIL NFR BLD: 0.4 % (ref 0–8)
ERYTHROCYTE [DISTWIDTH] IN BLOOD BY AUTOMATED COUNT: 12.8 % (ref 11.5–14.5)
EST. GFR  (NO RACE VARIABLE): >60 ML/MIN/1.73 M^2
GLUCOSE SERPL-MCNC: 99 MG/DL (ref 70–110)
HCT VFR BLD AUTO: 36.9 % (ref 37–48.5)
HGB BLD-MCNC: 12.3 G/DL (ref 12–16)
IMM GRANULOCYTES # BLD AUTO: 0.03 K/UL (ref 0–0.04)
IMM GRANULOCYTES NFR BLD AUTO: 0.4 % (ref 0–0.5)
LYMPHOCYTES # BLD AUTO: 1.8 K/UL (ref 1–4.8)
LYMPHOCYTES NFR BLD: 24.9 % (ref 18–48)
MCH RBC QN AUTO: 28.7 PG (ref 27–31)
MCHC RBC AUTO-ENTMCNC: 33.3 G/DL (ref 32–36)
MCV RBC AUTO: 86 FL (ref 82–98)
MONOCYTES # BLD AUTO: 0.6 K/UL (ref 0.3–1)
MONOCYTES NFR BLD: 8.1 % (ref 4–15)
NEUTROPHILS # BLD AUTO: 4.8 K/UL (ref 1.8–7.7)
NEUTROPHILS NFR BLD: 65.6 % (ref 38–73)
NRBC BLD-RTO: 0 /100 WBC
PLATELET # BLD AUTO: 233 K/UL (ref 150–450)
PMV BLD AUTO: 8.9 FL (ref 9.2–12.9)
POTASSIUM SERPL-SCNC: 3.9 MMOL/L (ref 3.5–5.1)
PROT SERPL-MCNC: 7.2 G/DL (ref 6–8.4)
RBC # BLD AUTO: 4.29 M/UL (ref 4–5.4)
SODIUM SERPL-SCNC: 129 MMOL/L (ref 136–145)
WBC # BLD AUTO: 7.24 K/UL (ref 3.9–12.7)

## 2023-11-10 PROCEDURE — 36415 COLL VENOUS BLD VENIPUNCTURE: CPT | Performed by: EMERGENCY MEDICINE

## 2023-11-10 PROCEDURE — 85025 COMPLETE CBC W/AUTO DIFF WBC: CPT | Performed by: EMERGENCY MEDICINE

## 2023-11-10 PROCEDURE — 25000003 PHARM REV CODE 250: Performed by: EMERGENCY MEDICINE

## 2023-11-10 PROCEDURE — 99283 EMERGENCY DEPT VISIT LOW MDM: CPT

## 2023-11-10 PROCEDURE — 80053 COMPREHEN METABOLIC PANEL: CPT | Performed by: EMERGENCY MEDICINE

## 2023-11-10 RX ORDER — ACETAMINOPHEN 325 MG/1
650 TABLET ORAL
Status: COMPLETED | OUTPATIENT
Start: 2023-11-10 | End: 2023-11-10

## 2023-11-10 RX ORDER — ALPRAZOLAM 1 MG/1
1 TABLET ORAL
Status: COMPLETED | OUTPATIENT
Start: 2023-11-10 | End: 2023-11-10

## 2023-11-10 RX ADMIN — ALPRAZOLAM 1 MG: 1 TABLET ORAL at 01:11

## 2023-11-10 RX ADMIN — ACETAMINOPHEN 650 MG: 325 TABLET ORAL at 02:11

## 2023-11-10 NOTE — ED PROVIDER NOTES
Encounter Date: 11/10/2023       History     Chief Complaint   Patient presents with    Anxiety     Pt comes in via ems with c/o anxiety. Pt as tarted a new medication for BP and doesn't feel like it is working, pt has not taken anxiety medication today. Pt has no other complaints      HPI 89-year-old woman with a history of anxiety, hypertension, chronic pain/arthritis presents emergency department for evaluation of elevated blood pressure and feeling anxious.  Saw primary care in the past couple of days and started on losartan hydrochlorothiazide and amlodipine 10 mg.  Blood pressure was good when she went to bed but woke up this morning and it was elevated.  Patient took blood pressure medicine and anxiolytics but states it has not helped her anxiety.  Review of patient's allergies indicates:   Allergen Reactions    Adhesive Rash    Morphine     Oxymetazoline hcl Rash    Sulfa (sulfonamide antibiotics) Nausea And Vomiting    Xylometazoline hcl      Past Medical History:   Diagnosis Date    Arthritis     Cataracts, bilateral     Osteopenia     Thyroid disease      Past Surgical History:   Procedure Laterality Date    CHOLECYSTECTOMY      DILATION AND CURETTAGE OF UTERUS      EPIDURAL STEROID INJECTION N/A 12/23/2020    Procedure: RACHAEL Caudal;  Surgeon: Jim Ugalde MD;  Location: [facility] OR;  Service: Pain Management;  Laterality: N/A;    EYE SURGERY      GALLBLADDER SURGERY      HYSTERECTOMY      HYSTEROSCOPIC SURGICAL PROCEDURE N/A 5/27/2019    Procedure: HYSTEROSCOPY, THERAPEUTIC;  Surgeon: Gordon Cedillo MD;  Location: [facility] OR;  Service: OB/GYN;  Laterality: N/A;  mysoure    HYSTEROSCOPY WITH DILATION AND CURETTAGE OF UTERUS N/A 5/27/2019    Procedure: HYSTEROSCOPY, WITH DILATION AND CURETTAGE OF UTERUS;  Surgeon: Gordon Cedillo MD;  Location: [facility] OR;  Service: OB/GYN;  Laterality: N/A;    JOINT REPLACEMENT Bilateral     Hips    lamenectomy      ROBOT-ASSISTED LAPAROSCOPIC ABDOMINAL SACROCOLPOPEXY  USING DA DANETTE XI N/A 11/9/2021    Procedure: XI ROBOTIC SACROCOLPOPEXY, ABDOMINAL;  Surgeon: Reza Napoles MD;  Location: [facility] OR;  Service: OB/GYN;  Laterality: N/A;    ROBOT-ASSISTED LAPAROSCOPIC SALPINGO-OOPHORECTOMY USING DA DANETTE XI Bilateral 11/9/2021    Procedure: XI ROBOTIC SALPINGO-OOPHORECTOMY;  Surgeon: Reza Napoles MD;  Location: [facility] OR;  Service: OB/GYN;  Laterality: Bilateral;    ROBOT-ASSISTED SUBTOTAL HYSTERECTOMY N/A 11/9/2021    Procedure: ROBOTIC HYSTERECTOMY, SUPRACERVICAL;  Surgeon: Reza Napoles MD;  Location: [facility] OR;  Service: OB/GYN;  Laterality: N/A;    TOTAL REPLACEMENT OF BOTH HIP JOINTS USING COMPUTER-ASSISTED NAVIGATION       No family history on file.  Social History     Tobacco Use    Smoking status: Never    Smokeless tobacco: Never   Substance Use Topics    Alcohol use: Not Currently    Drug use: Never     Review of Systems   Constitutional:  Positive for fatigue. Negative for fever.   HENT:  Negative for sore throat.    Respiratory:  Negative for shortness of breath.    Cardiovascular:  Negative for chest pain.   Genitourinary:  Negative for difficulty urinating.   Musculoskeletal:  Negative for back pain.   Skin:  Negative for rash.   Neurological:  Negative for weakness.   Hematological:  Does not bruise/bleed easily.   Psychiatric/Behavioral:  The patient is nervous/anxious.        Physical Exam     Initial Vitals [11/10/23 1204]   BP Pulse Resp Temp SpO2   (!) 184/84 78 18 98.4 °F (36.9 °C) 98 %      MAP       --         Physical Exam    Nursing note and vitals reviewed.  Constitutional: She appears well-developed and well-nourished. No distress.   HENT:   Head: Normocephalic and atraumatic.   Eyes: EOM are normal. Pupils are equal, round, and reactive to light.   Neck: Neck supple.   Pulmonary/Chest: No respiratory distress.   Musculoskeletal:         General: Normal range of motion.      Cervical back: Neck supple.     Neurological: She is alert and oriented to  person, place, and time.   Skin: Skin is warm and dry.   Psychiatric: Her mood appears anxious.         ED Course   Procedures  Labs Reviewed   CBC W/ AUTO DIFFERENTIAL - Abnormal; Notable for the following components:       Result Value    Hematocrit 36.9 (*)     MPV 8.9 (*)     All other components within normal limits   COMPREHENSIVE METABOLIC PANEL - Abnormal; Notable for the following components:    Sodium 129 (*)     All other components within normal limits          Imaging Results    None          Medications   ALPRAZolam tablet 1 mg (1 mg Oral Given 11/10/23 1346)   acetaminophen tablet 650 mg (650 mg Oral Given 11/10/23 1430)     Medical Decision Making  89-year-old woman presents emergency department for evaluation elevated blood pressure, anxiety.  She had recently had changes to her blood pressure medication and increases.  I believe her blood pressure is elevated in part secondary to her anxious state.  Laboratory evaluation shows no evidence of acute end-organ damage to raise suspicion for hypertensive emergency.  Patient given Xanax with improvement her blood pressure 167/70.  I discussed with family members who are in agreement.  I will refer to Psychology/Mental Health for outpatient therapy.  Return precautions discussed.  Discharged in no acute distress.    Amount and/or Complexity of Data Reviewed  Labs: ordered.     Details: Sodium 129, potassium 3.9, creatinine 0.7    Risk  OTC drugs.  Prescription drug management.                               Clinical Impression:   Final diagnoses:  [F41.9] Anxiety (Primary)  [I10] Hypertension, unspecified type        ED Disposition Condition    Discharge Stable          ED Prescriptions    None       Follow-up Information       Follow up With Specialties Details Why Contact Info Additional Information    [facility]  As needed, If symptoms worsen [address] [place] [address] [facility] [id number] 1st floor    Mike  MD [facility]   [address] [facility] [place] [address]  [phone]                Saad Patiño MD  11/10/23 8017

## 2023-11-10 NOTE — TELEPHONE ENCOUNTER
atient co feeling anxious and would like to take her anxiety medication. Patient is advised to take as ordered.    Reason for Disposition   MODERATE anxiety (e.g., persistent or frequent anxiety symptoms; interferes with sleep, school, or work)    Additional Information   Negative: SEVERE difficulty breathing (e.g., struggling for each breath, speaks in single words)   Negative: Difficult to awaken or acting confused (e.g., disoriented, slurred speech)   Negative: Violent behavior, or threatening to physically hurt or kill someone   Negative: Sounds like a life-threatening emergency to the triager   Negative: Bluish (or gray) lips or face now   Negative: Chest pain   Negative: Palpitations, skipped heartbeat, or rapid heartbeat is main symptom   Negative: Cough is main symptom   Negative: Suicide thoughts, threats, attempts, or questions   Negative: Depression is main problem or symptom (e.g., feelings of sadness or hopelessness)   Negative: [1] Difficulty breathing AND [2] persists > 10 minutes AND [3] not relieved by reassurance provided by triager   Negative: [1] Lightheadedness or dizziness AND [2] persists > 10 minutes AND [3] not relieved by reassurance provided by triager   Negative: [1] SEVERE anxiety (e.g., extremely anxious with intense emotional symptoms such as feeling of unreality, urge to flee, unable to calm down; unable to cope or function) AND [2] not better after 10 minutes of reassurance and Care Advice   Negative: [1] Panic attack symptoms (e.g., sudden onset of intense fear and symptoms such as dizziness, feeling of impending doom or fear of dying, hyperventilation, numbness or tingling, sweating, trembling) AND [2] has not been evaluated for this by doctor (or NP/PA)   Negative: [1] Alcohol or drug use, known or suspected AND [2] feeling very shaky (i.e., visible tremors of hands)   Negative: Patient sounds very sick or weak to the triager   Negative: Patient sounds very upset or troubled to  the triager    Protocols used: Anxiety and Panic Attack-A-AH

## 2023-11-10 NOTE — ED NOTES
"Pt awake,alert and oriented. Pt rambles about various complaints. States she was just started on a new blood pressure medication. Took the first pill this morning at 4am and stated "that was too early." Pt begins listing blood pressure readings, stating her daughter has had to "run me to the ER so many times for my blood pressure." When asked if there are any associated symptoms with the htn pt ignores that question and begin talking about her blood pressure readings again. Pt's daughter at bedside, adds no information concerning today's ER visit.     "

## 2023-11-14 ENCOUNTER — PATIENT MESSAGE (OUTPATIENT)
Dept: FAMILY MEDICINE | Facility: CLINIC | Age: 88
End: 2023-11-14
Payer: MEDICARE

## 2023-11-14 ENCOUNTER — TELEPHONE (OUTPATIENT)
Dept: PSYCHIATRY | Facility: CLINIC | Age: 88
End: 2023-11-14
Payer: MEDICARE

## 2023-11-14 NOTE — TELEPHONE ENCOUNTER
Returned call to patient regarding referral placed at ER for therapy. She will be placed on our wait list. Referrals are worked in order as they are received. Current wait is approximately 6-8 months. She asked if there were any therapist with [facility] near her home in [place]. I advised her that at this time there aren't but I could send her a resource list through the portal and there are [place] therapist on list. She asked if I could mail her one as well in case she can't open document. Advised her I would send her one through the mail as well.

## 2023-11-15 ENCOUNTER — TELEPHONE (OUTPATIENT)
Dept: FAMILY MEDICINE | Facility: CLINIC | Age: 88
End: 2023-11-15
Payer: MEDICARE

## 2023-11-15 NOTE — TELEPHONE ENCOUNTER
"Patient down stairs in lobby requesting for a B/P check, put her on schedule for nurse visit for B/P check, when patient arrives was informed by her she stated her "mood" pill wasn't working, and she stopped taking it, saw in previous notes that patient was requesting xanax, and was refused per another provider, encouraged patient to take the Lexapro as ordered and that it sometimes took few weeks for the medication to completely be effective, patient didn't want her blood pressure taken, she had received message from provider covering for Mike and from psychology, patient grand daughter with her, they both voiced understanding and patient agreed to restart taking her "MOOD" pill again and see how it works, asymptomatic of any blood pressure isses  "

## 2023-11-18 DIAGNOSIS — F41.9 ANXIETY: ICD-10-CM

## 2023-11-20 RX ORDER — HYDROXYZINE HYDROCHLORIDE 25 MG/1
25 TABLET, FILM COATED ORAL 3 TIMES DAILY PRN
Qty: 90 TABLET | Refills: 1 | Status: SHIPPED | OUTPATIENT
Start: 2023-11-20 | End: 2023-12-25 | Stop reason: SDUPTHER

## 2023-11-20 NOTE — TELEPHONE ENCOUNTER
Dear Dr. Casanova,     In the absence of  Dr. Mckeon , can you please address this patients concern or request?    Thank you,  Kelsie Jeffrey LPN

## 2023-11-27 ENCOUNTER — OUTPATIENT CASE MANAGEMENT (OUTPATIENT)
Dept: ADMINISTRATIVE | Facility: OTHER | Age: 88
End: 2023-11-27
Payer: MEDICARE

## 2023-11-27 NOTE — PROGRESS NOTES
11/27/2023  1st attempt to complete Follow-Up  for Outpatient Care Management, left message.  Will send letter thru [facility] portal.

## 2023-11-30 ENCOUNTER — OFFICE VISIT (OUTPATIENT)
Dept: PODIATRY | Facility: CLINIC | Age: 88
End: 2023-11-30
Payer: MEDICARE

## 2023-11-30 VITALS
SYSTOLIC BLOOD PRESSURE: 142 MMHG | BODY MASS INDEX: 31.54 KG/M2 | HEART RATE: 71 BPM | HEIGHT: 63 IN | DIASTOLIC BLOOD PRESSURE: 64 MMHG | WEIGHT: 178 LBS

## 2023-11-30 DIAGNOSIS — L60.0 INGROWN NAIL: Primary | ICD-10-CM

## 2023-11-30 PROCEDURE — 99999 PR PBB SHADOW E&M-EST. PATIENT-LVL IV: CPT | Mod: PBBFAC,,, | Performed by: PODIATRIST

## 2023-11-30 PROCEDURE — 99999 PR PBB SHADOW E&M-EST. PATIENT-LVL IV: ICD-10-PCS | Mod: PBBFAC,,, | Performed by: PODIATRIST

## 2023-11-30 PROCEDURE — 99214 OFFICE O/P EST MOD 30 MIN: CPT | Mod: PBBFAC,PN | Performed by: PODIATRIST

## 2023-11-30 PROCEDURE — 99202 PR OFFICE/OUTPT VISIT, NEW, LEVL II, 15-29 MIN: ICD-10-PCS | Mod: S$PBB,,, | Performed by: PODIATRIST

## 2023-11-30 PROCEDURE — 99202 OFFICE O/P NEW SF 15 MIN: CPT | Mod: S$PBB,,, | Performed by: PODIATRIST

## 2023-12-02 PROBLEM — L60.0 INGROWN NAIL: Status: ACTIVE | Noted: 2023-12-02

## 2023-12-03 NOTE — PROGRESS NOTES
Subjective:       Patient ID: Mahsa Pedroza is a 89 y.o. female.    Chief Complaint: Ingrown Toenail  Patient presents today for a new patient evaluation she is complaining about a painfully ingrown left great toe she states that she traumatized this toe years ago and has had some problems on and off but not till recently has her left big toe started to bother her again.    Past Medical History:   Diagnosis Date    Arthritis     Cataracts, bilateral     Osteopenia     Thyroid disease      Past Surgical History:   Procedure Laterality Date    CHOLECYSTECTOMY      DILATION AND CURETTAGE OF UTERUS      EPIDURAL STEROID INJECTION N/A 12/23/2020    Procedure: RACHAEL Caudal;  Surgeon: Jim Ugalde MD;  Location: [facility] OR;  Service: Pain Management;  Laterality: N/A;    EYE SURGERY      GALLBLADDER SURGERY      HYSTERECTOMY      HYSTEROSCOPIC SURGICAL PROCEDURE N/A 5/27/2019    Procedure: HYSTEROSCOPY, THERAPEUTIC;  Surgeon: Gordon Cedillo MD;  Location: [facility] OR;  Service: OB/GYN;  Laterality: N/A;  mysoure    HYSTEROSCOPY WITH DILATION AND CURETTAGE OF UTERUS N/A 5/27/2019    Procedure: HYSTEROSCOPY, WITH DILATION AND CURETTAGE OF UTERUS;  Surgeon: Gordon Cedillo MD;  Location: [facility] OR;  Service: OB/GYN;  Laterality: N/A;    JOINT REPLACEMENT Bilateral     Hips    lamenectomy      ROBOT-ASSISTED LAPAROSCOPIC ABDOMINAL SACROCOLPOPEXY USING DA DANETTE XI N/A 11/9/2021    Procedure: XI ROBOTIC SACROCOLPOPEXY, ABDOMINAL;  Surgeon: Reza Napoles MD;  Location: [facility] OR;  Service: OB/GYN;  Laterality: N/A;    ROBOT-ASSISTED LAPAROSCOPIC SALPINGO-OOPHORECTOMY USING DA DANETTE XI Bilateral 11/9/2021    Procedure: XI ROBOTIC SALPINGO-OOPHORECTOMY;  Surgeon: Reza Napoles MD;  Location: [facility] OR;  Service: OB/GYN;  Laterality: Bilateral;    ROBOT-ASSISTED SUBTOTAL HYSTERECTOMY N/A 11/9/2021    Procedure: ROBOTIC HYSTERECTOMY, SUPRACERVICAL;  Surgeon: Reza Napoles MD;  Location: [facility] OR;  Service: OB/GYN;   Laterality: N/A;    TOTAL REPLACEMENT OF BOTH HIP JOINTS USING COMPUTER-ASSISTED NAVIGATION           Social History     Socioeconomic History    Marital status:    Tobacco Use    Smoking status: Never    Smokeless tobacco: Never   Substance and Sexual Activity    Alcohol use: Not Currently    Drug use: Never    Sexual activity: Not Currently     Birth control/protection: See Surgical Hx     Social Determinants of Health     Financial Resource Strain: Low Risk  (9/25/2023)    Overall Financial Resource Strain (CARDIA)     Difficulty of Paying Living Expenses: Not hard at all   Food Insecurity: No Food Insecurity (9/25/2023)    Hunger Vital Sign     Worried About Running Out of Food in the Last Year: Never true     Ran Out of Food in the Last Year: Never true   Transportation Needs: No Transportation Needs (9/25/2023)    PRAPARE - Transportation     Lack of Transportation (Medical): No     Lack of Transportation (Non-Medical): No   Physical Activity: Inactive (9/25/2023)    Exercise Vital Sign     Days of Exercise per Week: 0 days     Minutes of Exercise per Session: 0 min   Stress: Stress Concern Present (9/25/2023)    [employer] - Occupational Stress Questionnaire     Feeling of Stress : Very much   Social Connections: Moderately Isolated (9/25/2023)    Social Connection and Isolation Panel [NHANES]     Frequency of Communication with Friends and Family: More than three times a week     Frequency of Social Gatherings with Friends and Family: Never     Attends Taoist Services: More than 4 times per year     Active Member of Clubs or Organizations: No     Attends Club or Organization Meetings: Never     Marital Status:    Housing Stability: Low Risk  (9/25/2023)    Housing Stability Vital Sign     Unable to Pay for Housing in the Last Year: No     Number of Places Lived in the Last Year: 1     Unstable Housing in the Last Year: No       Current Outpatient Medications    Medication Sig Dispense Refill    amLODIPine (NORVASC) 10 MG tablet Take 1 tablet (10 mg total) by mouth once daily. 30 tablet 11    chlorhexidine (PERIDEX) 0.12 % solution SMARTSIG:By Mouth      diclofenac sodium (VOLTAREN) 1 % Gel Apply 2 g topically 4 (four) times daily as needed. 100 g 1    EScitalopram oxalate (LEXAPRO) 5 MG Tab Take 1 tablet (5 mg total) by mouth once daily. 30 tablet 11    estradioL (ESTRACE) 0.01 % (0.1 mg/gram) vaginal cream USE 1 GRAM VAGINALLY EVERY NIGHT FOR 4 WEEKS THEN USE VAGINALLY 3 TIMES EVERY WEEK 42.5 g 1    fluticasone propionate (FLOVENT DISKUS) 50 mcg/actuation DsDv Inhale into the lungs. Controller      hydrOXYzine HCL (ATARAX) 25 MG tablet Take 1 tablet (25 mg total) by mouth 3 (three) times daily as needed for Anxiety. 90 tablet 1    L.acidophil-L.plantar-Bifido 7 (UP4 PROBIOTICS ADULT) 15 billion cell Cap Take 1 tablet by mouth once daily.      levothyroxine (SYNTHROID) 50 MCG tablet TAKE 1 TABLET(50 MCG) BY MOUTH BEFORE BREAKFAST 90 tablet 3    losartan-hydrochlorothiazide 50-12.5 mg (HYZAAR) 50-12.5 mg per tablet Take 1 tablet by mouth once daily. 90 tablet 3    tiZANidine (ZANAFLEX) 2 MG tablet Take 2 tablets (4 mg total) by mouth every 8 (eight) hours as needed. 30 tablet 0     No current facility-administered medications for this visit.     Facility-Administered Medications Ordered in Other Visits   Medication Dose Route Frequency Provider Last Rate Last Admin    lactated ringers infusion   Intravenous Once PRN Jim Ugalde MD         Review of patient's allergies indicates:   Allergen Reactions    Adhesive Rash    Morphine     Oxymetazoline hcl Rash    Sulfa (sulfonamide antibiotics) Nausea And Vomiting    Xylometazoline hcl        Review of Systems   Musculoskeletal:  Positive for arthralgias and joint swelling.   Skin:  Positive for color change.   All other systems reviewed and are negative.      Objective:      Vitals:    11/30/23 1015   BP: (!) 142/64   BP  "Location: Right forearm   Patient Position: Sitting   Pulse: 71   Weight: 80.7 kg (178 lb)   Height: 5' 3" (1.6 m)     Physical Exam  Vitals and nursing note reviewed.   Constitutional:       Appearance: Normal appearance.   Cardiovascular:      Pulses:           Dorsalis pedis pulses are 1+ on the right side and 1+ on the left side.        Posterior tibial pulses are 1+ on the right side and 1+ on the left side.   Pulmonary:      Effort: Pulmonary effort is normal.   Musculoskeletal:         General: Tenderness present.   Feet:      Right foot:      Protective Sensation: 2 sites tested.  2 sites sensed.      Left foot:      Protective Sensation: 2 sites tested.  2 sites sensed.      Skin integrity: Erythema and warmth present.      Toenail Condition: Left toenails are ingrown.   Skin:     Capillary Refill: Capillary refill takes more than 3 seconds.      Findings: Erythema present.   Neurological:      General: No focal deficit present.      Mental Status: She is alert.   Psychiatric:         Mood and Affect: Mood normal.         Behavior: Behavior normal.          No digital pictures were able to be attached to the patient's chart due to an [employer] system error.  Assessment:       1. Ingrown nail        Plan:       Patient presents today for a new patient evaluation she is complaining about a painfully ingrown left great toe she states that she traumatized this toe years ago and has had some problems on and off but not till recently has her left big toe started to bother her again.  On evaluation patient has positive erythema positive edema with pain noted upon palpation along the lateral border of the patient's left hallux.  I did advised the patient I did not feel I did do a nail avulsion I can see where the nail has been traumatized cause the nail to become ingrown from time to time I was able to carefully trim and remove a large portion of nail from the distal lateral border patient immediate will the nail was " removed bacitracin ointment and a light dressing was applied I advised the patient it does affect again for bacitracin ointment and a light dressing applied.  Follow-up will be as needed.This note was created using [employer] video voice recognition software that occasionally misinterpreted phrases or words.

## 2023-12-04 ENCOUNTER — OUTPATIENT CASE MANAGEMENT (OUTPATIENT)
Dept: ADMINISTRATIVE | Facility: OTHER | Age: 88
End: 2023-12-04
Payer: MEDICARE

## 2023-12-04 DIAGNOSIS — F41.9 ANXIETY: ICD-10-CM

## 2023-12-04 RX ORDER — ESCITALOPRAM OXALATE 5 MG/1
5 TABLET ORAL DAILY
Qty: 30 TABLET | Refills: 11 | Status: CANCELLED | OUTPATIENT
Start: 2023-12-04 | End: 2024-12-03

## 2023-12-04 NOTE — PROGRESS NOTES
Outpatient Care Management  Plan of Care Follow Up Visit    Patient: Mahsa Pedroza  MRN: [id number]  Date of Service: 12/04/2023  Completed by: Jodi Tanner RN  Referral Date: 09/13/2023    Reason for Visit   Patient presents with    OPCM RN Follow Up Call       Brief Summary: see care plan   Next Steps: Follow up after Rosa Maria per her request   Review pain, BP, anxiety and psychologist appt

## 2023-12-05 ENCOUNTER — TELEPHONE (OUTPATIENT)
Dept: FAMILY MEDICINE | Facility: CLINIC | Age: 88
End: 2023-12-05
Payer: MEDICARE

## 2023-12-05 NOTE — TELEPHONE ENCOUNTER
----- Message from Sujata Gabriel sent at 12/5/2023 11:33 AM CST -----  Regarding: RX Refill Request- change of pharmacy  Contact: patient at [phone]  Type:  RX Refill Request    Who Called:  patient at [phone]    Preferred Pharmacy with phone number:    [id number] - [place], [place] - [address] [place] [address] AT [address]  [address]  [place] [address]  Phone: [phone] Fax: [phone]      Additional Information:  patient [facility] [place]. Please call and advise. Thank you    EScitalopram oxalate (LEXAPRO) 5 MG Tab 30 tablet 11 11/7/2023 11/6/2024   Sig - Route: Take 1 tablet (5 mg total) by mouth once daily. - Oral   Sent to pharmacy as: EScitalopram oxalate (LEXAPRO) 5 MG Tab   E-Prescribing Status: Receipt confirmed by pharmacy (11/7/2023  3:46 PM CST)

## 2023-12-07 DIAGNOSIS — F41.9 ANXIETY: ICD-10-CM

## 2023-12-07 RX ORDER — ESCITALOPRAM OXALATE 5 MG/1
5 TABLET ORAL DAILY
Qty: 30 TABLET | Refills: 11 | Status: SHIPPED | OUTPATIENT
Start: 2023-12-07 | End: 2023-12-19

## 2023-12-11 ENCOUNTER — OFFICE VISIT (OUTPATIENT)
Dept: ORTHOPEDICS | Facility: CLINIC | Age: 88
End: 2023-12-11
Payer: MEDICARE

## 2023-12-11 ENCOUNTER — HOSPITAL ENCOUNTER (OUTPATIENT)
Dept: RADIOLOGY | Facility: HOSPITAL | Age: 88
Discharge: HOME | End: 2023-12-11
Attending: ORTHOPAEDIC SURGERY
Payer: MEDICARE

## 2023-12-11 VITALS — BODY MASS INDEX: 31.54 KG/M2 | WEIGHT: 178 LBS | RESPIRATION RATE: 18 BRPM | HEIGHT: 63 IN

## 2023-12-11 DIAGNOSIS — G56.01 CARPAL TUNNEL SYNDROME OF RIGHT WRIST: ICD-10-CM

## 2023-12-11 DIAGNOSIS — M79.641 PAIN OF RIGHT HAND: Primary | ICD-10-CM

## 2023-12-11 DIAGNOSIS — M65.311 TRIGGER THUMB OF RIGHT HAND: Primary | ICD-10-CM

## 2023-12-11 DIAGNOSIS — M79.641 PAIN OF RIGHT HAND: ICD-10-CM

## 2023-12-11 DIAGNOSIS — M65.311 TRIGGER THUMB OF RIGHT HAND: ICD-10-CM

## 2023-12-11 PROCEDURE — 99999 PR PBB SHADOW E&M-EST. PATIENT-LVL III: CPT | Mod: PBBFAC,,, | Performed by: ORTHOPAEDIC SURGERY

## 2023-12-11 PROCEDURE — 20550 TENDON SHEATH: ICD-10-PCS | Mod: S$PBB,RT,, | Performed by: ORTHOPAEDIC SURGERY

## 2023-12-11 PROCEDURE — 99999 PR PBB SHADOW E&M-EST. PATIENT-LVL III: ICD-10-PCS | Mod: PBBFAC,,, | Performed by: ORTHOPAEDIC SURGERY

## 2023-12-11 PROCEDURE — 99213 OFFICE O/P EST LOW 20 MIN: CPT | Mod: PBBFAC,PN,25 | Performed by: ORTHOPAEDIC SURGERY

## 2023-12-11 PROCEDURE — 73130 X-RAY EXAM OF HAND: CPT | Mod: TC,PN,RT

## 2023-12-11 PROCEDURE — 73130 XR HAND COMPLETE 3 VIEW RIGHT: ICD-10-PCS | Mod: 26,RT,, | Performed by: RADIOLOGY

## 2023-12-11 PROCEDURE — 20550 NJX 1 TENDON SHEATH/LIGAMENT: CPT | Mod: PBBFAC,PN,RT | Performed by: ORTHOPAEDIC SURGERY

## 2023-12-11 PROCEDURE — 99999PBSHW PR PBB SHADOW TECHNICAL ONLY FILED TO HB: Mod: PBBFAC,,,

## 2023-12-11 PROCEDURE — 73130 X-RAY EXAM OF HAND: CPT | Mod: 26,RT,, | Performed by: RADIOLOGY

## 2023-12-11 PROCEDURE — 99214 PR OFFICE/OUTPT VISIT, EST, LEVL IV, 30-39 MIN: ICD-10-PCS | Mod: S$PBB,25,, | Performed by: ORTHOPAEDIC SURGERY

## 2023-12-11 PROCEDURE — 99999PBSHW PR PBB SHADOW TECHNICAL ONLY FILED TO HB: ICD-10-PCS | Mod: PBBFAC,,,

## 2023-12-11 PROCEDURE — 99214 OFFICE O/P EST MOD 30 MIN: CPT | Mod: S$PBB,25,, | Performed by: ORTHOPAEDIC SURGERY

## 2023-12-11 RX ORDER — TRIAMCINOLONE ACETONIDE 40 MG/ML
40 INJECTION, SUSPENSION INTRA-ARTICULAR; INTRAMUSCULAR
Status: DISCONTINUED | OUTPATIENT
Start: 2023-12-11 | End: 2023-12-11 | Stop reason: HOSPADM

## 2023-12-11 RX ADMIN — TRIAMCINOLONE ACETONIDE 40 MG: 40 INJECTION, SUSPENSION INTRA-ARTICULAR; INTRAMUSCULAR at 10:12

## 2023-12-11 NOTE — PROGRESS NOTES
Subjective:      Patient ID: Mahsa Pedroza is a 89 y.o. female.    Chief Complaint: Pain of the Right Hand    HPI  Patient is seen with the ongoing right hand pain tingling and numbness in and recurrent catching of the right thumb.  Did very well in regards to the thumb from her previous injection.  She feels like she is losing the function of the hand.  ROS      Objective:    Ortho Exam     Constitutional:   Patient is alert  and oriented in no acute distress  HEENT:  normocephalic atraumatic; PERRL EOMI  Neck:  Supple without adenopathy  Cardiovascular:  Normal rate and rhythm  Pulmonary:  Normal respiratory effort normal chest wall expansion  Abdominal:  Nonprotuberant nondistended  Musculoskeletal:  Patient has obvious triggering of the right thumb  She has a positive carpal tunnel compression test and some thenar atrophy  Neurological:  No focal defect; cranial nerves 2-12 grossly intact  Psychiatric/behavioral:  Mood and behavior normal          My Radiographs Findings:    Radiographs of the right hand and thumb without acute osseous abnormalities  Assessment:       Encounter Diagnoses   Name Primary?    Trigger thumb of right hand Yes    Carpal tunnel syndrome of right wrist          Plan:       I have discussed medical condition and treatment options with her at length.  After a verbal consent and sterile prep I injected her right thumb at the A1 pulley region without complication.  I have discussed and she is agreeable to nerve studies I will have her continue with night splints I will see her back after her nerve study sooner if any questions or problems.        Past Medical History:   Diagnosis Date    Arthritis     Cataracts, bilateral     Osteopenia     Thyroid disease      Past Surgical History:   Procedure Laterality Date    CHOLECYSTECTOMY      DILATION AND CURETTAGE OF UTERUS      EPIDURAL STEROID INJECTION N/A 12/23/2020    Procedure: RACHAEL Caudal;  Surgeon: Jim Ugalde MD;  Location: [facility]  OR;  Service: Pain Management;  Laterality: N/A;    EYE SURGERY      GALLBLADDER SURGERY      HYSTERECTOMY      HYSTEROSCOPIC SURGICAL PROCEDURE N/A 5/27/2019    Procedure: HYSTEROSCOPY, THERAPEUTIC;  Surgeon: Gordon Cedillo MD;  Location: [facility] OR;  Service: OB/GYN;  Laterality: N/A;  mysoure    HYSTEROSCOPY WITH DILATION AND CURETTAGE OF UTERUS N/A 5/27/2019    Procedure: HYSTEROSCOPY, WITH DILATION AND CURETTAGE OF UTERUS;  Surgeon: Gordon Cedillo MD;  Location: [facility] OR;  Service: OB/GYN;  Laterality: N/A;    JOINT REPLACEMENT Bilateral     Hips    lamenectomy      ROBOT-ASSISTED LAPAROSCOPIC ABDOMINAL SACROCOLPOPEXY USING DA DANETTE XI N/A 11/9/2021    Procedure: XI ROBOTIC SACROCOLPOPEXY, ABDOMINAL;  Surgeon: Reza Napoles MD;  Location: [facility] OR;  Service: OB/GYN;  Laterality: N/A;    ROBOT-ASSISTED LAPAROSCOPIC SALPINGO-OOPHORECTOMY USING DA DANETTE XI Bilateral 11/9/2021    Procedure: XI ROBOTIC SALPINGO-OOPHORECTOMY;  Surgeon: Reza Napoles MD;  Location: [facility] OR;  Service: OB/GYN;  Laterality: Bilateral;    ROBOT-ASSISTED SUBTOTAL HYSTERECTOMY N/A 11/9/2021    Procedure: ROBOTIC HYSTERECTOMY, SUPRACERVICAL;  Surgeon: Reza Napoles MD;  Location: [facility] OR;  Service: OB/GYN;  Laterality: N/A;    TOTAL REPLACEMENT OF BOTH HIP JOINTS USING COMPUTER-ASSISTED NAVIGATION           Current Outpatient Medications:     amLODIPine (NORVASC) 10 MG tablet, Take 1 tablet (10 mg total) by mouth once daily., Disp: 30 tablet, Rfl: 11    chlorhexidine (PERIDEX) 0.12 % solution, SMARTSIG:By Mouth, Disp: , Rfl:     diclofenac sodium (VOLTAREN) 1 % Gel, Apply 2 g topically 4 (four) times daily as needed., Disp: 100 g, Rfl: 1    EScitalopram oxalate (LEXAPRO) 5 MG Tab, Take 1 tablet (5 mg total) by mouth once daily., Disp: 30 tablet, Rfl: 11    estradioL (ESTRACE) 0.01 % (0.1 mg/gram) vaginal cream, USE 1 GRAM VAGINALLY EVERY NIGHT FOR 4 WEEKS THEN USE VAGINALLY 3 TIMES EVERY WEEK, Disp: 42.5 g, Rfl: 1     fluticasone propionate (FLOVENT DISKUS) 50 mcg/actuation DsDv, Inhale into the lungs. Controller, Disp: , Rfl:     hydrOXYzine HCL (ATARAX) 25 MG tablet, Take 1 tablet (25 mg total) by mouth 3 (three) times daily as needed for Anxiety., Disp: 90 tablet, Rfl: 1    L.acidophil-L.plantar-Bifido 7 (UP4 PROBIOTICS ADULT) 15 billion cell Cap, Take 1 tablet by mouth once daily., Disp: , Rfl:     levothyroxine (SYNTHROID) 50 MCG tablet, TAKE 1 TABLET(50 MCG) BY MOUTH BEFORE BREAKFAST, Disp: 90 tablet, Rfl: 3    losartan-hydrochlorothiazide 50-12.5 mg (HYZAAR) 50-12.5 mg per tablet, Take 1 tablet by mouth once daily., Disp: 90 tablet, Rfl: 3    tiZANidine (ZANAFLEX) 2 MG tablet, Take 2 tablets (4 mg total) by mouth every 8 (eight) hours as needed., Disp: 30 tablet, Rfl: 0  No current facility-administered medications for this visit.    Facility-Administered Medications Ordered in Other Visits:     lactated ringers infusion, , Intravenous, Once PRN, Jim Ugalde MD    Review of patient's allergies indicates:   Allergen Reactions    Adhesive Rash    Morphine     Oxymetazoline hcl Rash    Sulfa (sulfonamide antibiotics) Nausea And Vomiting    Xylometazoline hcl        History reviewed. No pertinent family history.  Social History     Occupational History    Not on file   Tobacco Use    Smoking status: Never    Smokeless tobacco: Never   Substance and Sexual Activity    Alcohol use: Not Currently    Drug use: Never    Sexual activity: Not Currently     Birth control/protection: See Surgical Hx

## 2023-12-11 NOTE — PROCEDURES
Tendon Sheath    Date/Time: 12/11/2023 10:15 AM    Performed by: Cesar Nj MD  Authorized by: Cesar Nj MD    Consent Done?:  Yes (Verbal)  Indications:  Pain  Site marked: the procedure site was marked    Timeout: prior to procedure the correct patient, procedure, and site was verified    Prep: patient was prepped and draped in usual sterile fashion      Local anesthesia used?: Yes    Anesthesia:  Local infiltration  Local anesthetic:  Lidocaine 1% without epinephrine and bupivacaine 0.25% without epinephrine  Anesthetic total (ml):  2    Location:  Thumb  Site:  R thumb flexor tendon sheath  Ultrasonic guidance for needle placement?: No    Needle size:  25 G  Approach:  Volar  Medications:  40 mg triamcinolone acetonide 40 mg/mL  Patient tolerance:  Patient tolerated the procedure well with no immediate complications

## 2023-12-19 ENCOUNTER — OFFICE VISIT (OUTPATIENT)
Dept: FAMILY MEDICINE | Facility: CLINIC | Age: 88
End: 2023-12-19
Payer: MEDICARE

## 2023-12-19 VITALS
HEIGHT: 63 IN | OXYGEN SATURATION: 97 % | WEIGHT: 177.19 LBS | RESPIRATION RATE: 18 BRPM | HEART RATE: 87 BPM | BODY MASS INDEX: 31.39 KG/M2 | DIASTOLIC BLOOD PRESSURE: 60 MMHG | SYSTOLIC BLOOD PRESSURE: 126 MMHG

## 2023-12-19 DIAGNOSIS — F41.9 ANXIETY: Primary | ICD-10-CM

## 2023-12-19 PROCEDURE — 99999 PR PBB SHADOW E&M-EST. PATIENT-LVL III: ICD-10-PCS | Mod: PBBFAC,,, | Performed by: STUDENT IN AN ORGANIZED HEALTH CARE EDUCATION/TRAINING PROGRAM

## 2023-12-19 PROCEDURE — 99999 PR PBB SHADOW E&M-EST. PATIENT-LVL III: CPT | Mod: PBBFAC,,, | Performed by: STUDENT IN AN ORGANIZED HEALTH CARE EDUCATION/TRAINING PROGRAM

## 2023-12-19 PROCEDURE — 99214 PR OFFICE/OUTPT VISIT, EST, LEVL IV, 30-39 MIN: ICD-10-PCS | Mod: S$PBB,,, | Performed by: STUDENT IN AN ORGANIZED HEALTH CARE EDUCATION/TRAINING PROGRAM

## 2023-12-19 PROCEDURE — 99213 OFFICE O/P EST LOW 20 MIN: CPT | Mod: PBBFAC | Performed by: STUDENT IN AN ORGANIZED HEALTH CARE EDUCATION/TRAINING PROGRAM

## 2023-12-19 PROCEDURE — 99214 OFFICE O/P EST MOD 30 MIN: CPT | Mod: S$PBB,,, | Performed by: STUDENT IN AN ORGANIZED HEALTH CARE EDUCATION/TRAINING PROGRAM

## 2023-12-19 RX ORDER — ESCITALOPRAM OXALATE 10 MG/1
10 TABLET ORAL DAILY
Qty: 30 TABLET | Refills: 11 | Status: SHIPPED | OUTPATIENT
Start: 2023-12-19 | End: 2023-12-19

## 2023-12-19 RX ORDER — ESCITALOPRAM OXALATE 10 MG/1
10 TABLET ORAL DAILY
Qty: 90 TABLET | Refills: 3 | Status: SHIPPED | OUTPATIENT
Start: 2023-12-19 | End: 2024-01-14 | Stop reason: SDUPTHER

## 2023-12-19 NOTE — PROGRESS NOTES
[facility] Note    Subjective:    The HPI and pertinent ROS is included in the Diagnostic Impression Remarks section at the end of the note. Please see below for further details. Chief complaint is at end of note.     Mahsa is a pleasant intelligent patient who is here for evaluation.     Modified Medications    No medications on file       Data reviewed 274}  Previous medical records reviewed and summarized in plan section at end of note.      If you are due for any health screening(s) below please notify me so we can arrange them to be ordered and scheduled. Most healthy patients at your age complete them, but you are free to accept or refuse. If you can't do it, I'll definitely understand. If you can, I'd certainly appreciate it!     All of your core healthy metrics are met.      The following portions of the patient's history were reviewed and updated as appropriate: allergies, current medications, past family history, past medical history, past social history, past surgical history and problem list.    She  has a past medical history of Arthritis, Cataracts, bilateral, Osteopenia, and Thyroid disease.  She  has a past surgical history that includes Eye surgery; lamenectomy; Total replacement of both hip joints using computer-assisted navigation; Gallbladder surgery; Dilation and curettage of uterus; Hysteroscopy with dilation and curettage of uterus (N/A, 5/27/2019); Hysteroscopic surgical procedure (N/A, 5/27/2019); Cholecystectomy; Epidural steroid injection (N/A, 12/23/2020); Joint replacement (Bilateral); Robot-assisted subtotal hysterectomy (N/A, 11/9/2021); Robot-assisted laparoscopic abdominal sacrocolpopexy using da Lizet Xi (N/A, 11/9/2021); Robot-assisted laparoscopic salpingo-oophorectomy using da Lizet Xi (Bilateral, 11/9/2021); and Hysterectomy.    She  reports that she has never smoked. She has never used smokeless tobacco. She reports that she does not currently use alcohol.  "She reports that she does not use drugs.  She family history is not on file.    Review of patient's allergies indicates:   Allergen Reactions    Adhesive Rash    Morphine     Oxymetazoline hcl Rash    Sulfa (sulfonamide antibiotics) Nausea And Vomiting    Xylometazoline hcl        Tobacco Use: Low Risk  (12/19/2023)    Patient History     Smoking Tobacco Use: Never     Smokeless Tobacco Use: Never     Passive Exposure: Not on file     Physical Examination  General appearance: alert, cooperative, no distress  Neck: no thyromegaly, no neck stiffness  Lungs: clear to auscultation, no wheezes, rales or rhonchi, symmetric air entry  Heart: normal rate, regular rhythm, normal S1, S2, no murmurs, rubs, clicks or gallops  Abdomen: soft, nontender, nondistended, no rigidity, rebound, or guarding.   Back: no point tenderness over spine  Extremities: peripheral pulses normal, no unilateral leg swelling or calf tenderness   Neurological:alert, oriented, normal speech, no new focal findings or movement disorder noted from baseline    BP Readings from Last 3 Encounters:   12/19/23 126/60   11/30/23 (!) 142/64   11/10/23 (!) 167/70     Wt Readings from Last 3 Encounters:   12/19/23 80.4 kg (177 lb 3.2 oz)   12/11/23 80.7 kg (178 lb)   11/30/23 80.7 kg (178 lb)     /60 (BP Location: Right arm, Patient Position: Sitting, BP Method: Large (Manual))   Pulse 87   Resp 18   Ht 5' 3" (1.6 m)   Wt 80.4 kg (177 lb 3.2 oz)   SpO2 97%   BMI 31.39 kg/m²    274}  Laboratory: I have reviewed old labs below:    274}    Lab Results   Component Value Date    WBC 7.24 11/10/2023    HGB 12.3 11/10/2023    HCT 36.9 (L) 11/10/2023    MCV 86 11/10/2023     11/10/2023     (L) 11/10/2023    K 3.9 11/10/2023    CL 95 11/10/2023    CALCIUM 9.2 11/10/2023    CO2 23 11/10/2023    GLU 99 11/10/2023    BUN 10 11/10/2023    CREATININE 0.7 11/10/2023    EGFRNORACEVR >60 11/10/2023    ANIONGAP 11 11/10/2023    PROT 7.2 11/10/2023    " "ALBUMIN 4.2 11/10/2023    BILITOT 0.7 11/10/2023    ALKPHOS 58 11/10/2023    ALT 20 11/10/2023    AST 24 11/10/2023    INR 1.0 03/15/2020    CHOL 237 (H) 12/29/2022    TRIG 74 12/29/2022    HDL 74 12/29/2022    LDLCALC 148.2 12/29/2022    TSH 1.715 12/29/2022    HGBA1C 5.7 03/26/2021      Lab reviewed by me: Particular labs of significance that I will monitor, workup, or treat to improve are mentioned below in diagnostic impression remarks.    Imaging/EKG: I have reviewed the pertinent results and my findings are noted in remarks.  274}    CC:   Chief Complaint   Patient presents with    Hypertension     BP has been doing good        274}    Assessment/Plan  Mahsa Pedroza is a 89 y.o. female who presents to clinic with:  1. Anxiety       274}  Diagnostic Impression Remarks + HPI     Documentation entered by me for this encounter may have been done in part using speech-recognition technology. Although I have made an effort to ensure accuracy, "sound like" errors may exist and should be interpreted in context.     Patient is not requiring her norco nor her xanax and has weaned herself off of both medicine. Does not have any withdrawal symptoms.   Will increase her lexapro to better control her anxiety. Family noted improvement in her mood while on lexapro.    This is the extent of this pleasant patient's concerns at this present time. She did not feel chest pain upon exertion, dyspnea, nausea, vomiting, diaphoresis, or syncope. No pleuritic chest pain, unilateral leg swelling, calf tenderness, or calf pain. Negative for unintentional weight loss night sweats, hematuria, and fevers. Mahsa will return to clinic in a few months for further workup and reassessment or sooner as needed. She was instructed to call the clinic or go to the emergency department or urgent care immediately if her symptoms do not improve, worsens, or if any new symptoms develop. As we discussed that symptoms could worsen over the next 24 " hours she was advised that if any increased swelling, pain, or numbness arise to go immediately to the ED. Patient knows to call any time if an emergency arises. Shared decision making occurred and she verbalized understanding in agreement with this plan. I discussed imaging findings, diagnosis, possibilities, treatment options, medications, risks, and benefits. She had many questions regarding the options and long-term effects. All questions were answered. She expressed understanding after counseling regarding the diagnosis and recommendations. She was capable and demonstrated competence with understanding of these options. Shared decision making was performed resulting in her choosing the current treatment plan. Patient handout was given with instructions and recommendations. Advised the patient that if they become pregnant to alert us immediately to assess for medication changes. Having a healthy weight can decrease the risk of 13 cancers and is an important goal. I also discussed the importance of close follow up to discuss labs, change or modify her medications if needed, monitor side effects, and further evaluation of medical problems.     Additional workup planned: see labs ordered below.    See below for labs and meds ordered with associated diagnosis      1. Anxiety  - EScitalopram oxalate (LEXAPRO) 10 MG tablet; Take 1 tablet (10 mg total) by mouth once daily.  Dispense: 30 tablet; Refill: 11      Javier Mckeon MD   274}    If you are due for any health screening(s) below please notify me so we can arrange them to be ordered and scheduled. Most healthy patients at your age complete them, but you are free to accept or refuse.     If you can't do it, I'll definitely understand. If you can, I'd certainly appreciate it!   All of your core healthy metrics are met.

## 2023-12-25 DIAGNOSIS — F41.9 ANXIETY: ICD-10-CM

## 2023-12-26 RX ORDER — HYDROXYZINE HYDROCHLORIDE 25 MG/1
25 TABLET, FILM COATED ORAL 3 TIMES DAILY PRN
Qty: 90 TABLET | Refills: 1 | Status: SHIPPED | OUTPATIENT
Start: 2023-12-26

## 2023-12-27 NOTE — TELEPHONE ENCOUNTER
----- Message from Lucy Gardner sent at 12/26/2023  9:23 AM CST -----  Contact: self  Type: Needs Medical Advice  Who Called:  pt  Symptoms (please be specific):  blood pressure is high  How long has patient had these symptoms:  n/a  Pharmacy name and phone #:    [id number] - [place], [place] - [address] [place] [address] [address]  [address] [place] [address]  Phone: [phone] Fax: [phone]     Best Call Back Number: [phone]   Additional Information: pt states the pills is not lasting 24hrs.please call to discuss pt would like an appt on Friday if possible

## 2024-01-02 ENCOUNTER — OUTPATIENT CASE MANAGEMENT (OUTPATIENT)
Dept: ADMINISTRATIVE | Facility: OTHER | Age: 89
End: 2024-01-02
Payer: MEDICARE

## 2024-01-02 NOTE — LETTER
Mahsa Pedroza  [address] [place] [address]      Dear Mahsa Pedroza,     I am your nurse with [facility]s Outpatient Care Management Department. I have been unsuccessful at reaching you since we spoke on 12/04/2023.  At your earliest convenience, I would like to discuss your healthcare progress.      Please contact me at [phone] from 8:00AM to 4:30 PM on Monday thru Friday.     As you know, [facility] On [facility] is a program offered to you through [facility] where a nurse is available 24/7 to answer questions or provide medical advice, their number is [phone].    Thanks,  Jodi Tanner RN [facility]   Outpatient Care Management

## 2024-01-02 NOTE — PROGRESS NOTES
1/2/2024  1st attempt to complete Follow-Up  for Outpatient Care Management, left message.  Will send letter thru [facility] portal, unable to assess letter.

## 2024-01-04 ENCOUNTER — OUTPATIENT CASE MANAGEMENT (OUTPATIENT)
Dept: ADMINISTRATIVE | Facility: OTHER | Age: 89
End: 2024-01-04
Payer: MEDICARE

## 2024-01-04 NOTE — PROGRESS NOTES
Outpatient Care Management  Plan of Care Follow Up Visit    Patient: Mahsa Pedroza  MRN: [id number]  Date of Service: 01/04/2024  Completed by: Jodi Tanner RN  Referral Date: 09/13/2023    Reason for Visit   Patient presents with    [facility] RN Follow Up Call       Brief Summary: see care plan   Next Steps:  Patient agrees to follow up call with in 3 weeks  Mailed EMG test

## 2024-01-14 DIAGNOSIS — F41.9 ANXIETY: ICD-10-CM

## 2024-01-16 RX ORDER — ESCITALOPRAM OXALATE 10 MG/1
10 TABLET ORAL DAILY
Qty: 90 TABLET | Refills: 3 | Status: SHIPPED | OUTPATIENT
Start: 2024-01-16 | End: 2024-01-23

## 2024-01-23 ENCOUNTER — PATIENT MESSAGE (OUTPATIENT)
Dept: FAMILY MEDICINE | Facility: CLINIC | Age: 89
End: 2024-01-23
Payer: MEDICARE

## 2024-01-23 DIAGNOSIS — F41.9 ANXIETY: Primary | ICD-10-CM

## 2024-01-23 RX ORDER — BUPROPION HYDROCHLORIDE 150 MG/1
150 TABLET ORAL DAILY
Qty: 30 TABLET | Refills: 11 | Status: SHIPPED | OUTPATIENT
Start: 2024-01-23 | End: 2025-01-22

## 2024-01-24 ENCOUNTER — OUTPATIENT CASE MANAGEMENT (OUTPATIENT)
Dept: ADMINISTRATIVE | Facility: OTHER | Age: 89
End: 2024-01-24
Payer: MEDICARE

## 2024-01-24 NOTE — PROGRESS NOTES
1/24/2024  1st attempt to complete Follow-Up  for Outpatient Care Management, left message.  Will send letter thru [facility] portal.

## 2024-01-24 NOTE — LETTER
Mahsa Pedroza  [address] [place] [address]      Dear Mahsa Pedroza,     I am your nurse with [facility]patient [facility] Management Department. I have been unsuccessful at reaching you since we last spoke.  At your earliest convenience, I would like to discuss your healthcare progress.      Please contact me at [phone] from 8:00AM to 4:30 PM on Monday thru Friday.     As you know, [facility] is a program offered to you through [facility] where a nurse is available 24/7 to answer questions or provide medical advice, their number is [phone].    Thanks,  Jodi Tanner RN [facility]   Outpatient Care Management

## 2024-01-30 ENCOUNTER — OUTPATIENT CASE MANAGEMENT (OUTPATIENT)
Dept: ADMINISTRATIVE | Facility: OTHER | Age: 89
End: 2024-01-30
Payer: MEDICARE

## 2024-01-30 NOTE — PROGRESS NOTES
Outpatient Care Management  Plan of Care Follow Up Visit    Patient: Mahsa Pedroza  MRN: [id number]  Date of Service: 01/30/2024  Completed by: Jodi Tanner RN  Referral Date: 09/13/2023    Reason for Visit   Patient presents with    [facility] RN Follow Up Call    Case Closure       Brief Summary: She states that over all she is doing good. Will dis-enroll from [facility]. Care plan goals met. She has RN contact info. [facility] Case Closure.

## 2024-03-12 ENCOUNTER — OFFICE VISIT (OUTPATIENT)
Dept: FAMILY MEDICINE | Facility: CLINIC | Age: 89
End: 2024-03-12
Payer: MEDICARE

## 2024-03-12 VITALS
RESPIRATION RATE: 14 BRPM | WEIGHT: 174.19 LBS | DIASTOLIC BLOOD PRESSURE: 68 MMHG | HEART RATE: 98 BPM | BODY MASS INDEX: 30.86 KG/M2 | SYSTOLIC BLOOD PRESSURE: 136 MMHG | OXYGEN SATURATION: 96 % | HEIGHT: 63 IN

## 2024-03-12 DIAGNOSIS — E03.9 ACQUIRED HYPOTHYROIDISM: ICD-10-CM

## 2024-03-12 DIAGNOSIS — J84.89 OTHER SPECIFIED INTERSTITIAL PULMONARY DISEASES: Primary | ICD-10-CM

## 2024-03-12 DIAGNOSIS — I70.0 ATHEROSCLEROSIS OF AORTA: ICD-10-CM

## 2024-03-12 PROCEDURE — 99999 PR PBB SHADOW E&M-EST. PATIENT-LVL IV: CPT | Mod: PBBFAC,,, | Performed by: STUDENT IN AN ORGANIZED HEALTH CARE EDUCATION/TRAINING PROGRAM

## 2024-03-12 PROCEDURE — 99214 OFFICE O/P EST MOD 30 MIN: CPT | Mod: PBBFAC | Performed by: STUDENT IN AN ORGANIZED HEALTH CARE EDUCATION/TRAINING PROGRAM

## 2024-03-12 PROCEDURE — 99214 OFFICE O/P EST MOD 30 MIN: CPT | Mod: S$PBB,,, | Performed by: STUDENT IN AN ORGANIZED HEALTH CARE EDUCATION/TRAINING PROGRAM

## 2024-03-12 RX ORDER — LOSARTAN POTASSIUM 50 MG/1
50 TABLET ORAL
COMMUNITY
Start: 2024-01-08 | End: 2024-03-12

## 2024-03-19 ENCOUNTER — LAB VISIT (OUTPATIENT)
Dept: LAB | Facility: HOSPITAL | Age: 89
End: 2024-03-19
Attending: STUDENT IN AN ORGANIZED HEALTH CARE EDUCATION/TRAINING PROGRAM
Payer: MEDICARE

## 2024-03-19 DIAGNOSIS — E03.9 ACQUIRED HYPOTHYROIDISM: ICD-10-CM

## 2024-03-19 LAB — TSH SERPL DL<=0.005 MIU/L-ACNC: 2.18 UIU/ML (ref 0.4–4)

## 2024-03-19 PROCEDURE — 84443 ASSAY THYROID STIM HORMONE: CPT | Performed by: STUDENT IN AN ORGANIZED HEALTH CARE EDUCATION/TRAINING PROGRAM

## 2024-03-19 PROCEDURE — 36415 COLL VENOUS BLD VENIPUNCTURE: CPT | Performed by: STUDENT IN AN ORGANIZED HEALTH CARE EDUCATION/TRAINING PROGRAM

## 2024-04-03 ENCOUNTER — TELEPHONE (OUTPATIENT)
Dept: FAMILY MEDICINE | Facility: CLINIC | Age: 89
End: 2024-04-03
Payer: MEDICARE

## 2024-04-03 ENCOUNTER — PATIENT MESSAGE (OUTPATIENT)
Dept: FAMILY MEDICINE | Facility: CLINIC | Age: 89
End: 2024-04-03

## 2024-04-03 ENCOUNTER — PATIENT MESSAGE (OUTPATIENT)
Dept: FAMILY MEDICINE | Facility: CLINIC | Age: 89
End: 2024-04-03
Payer: MEDICARE

## 2024-04-03 ENCOUNTER — E-VISIT (OUTPATIENT)
Dept: FAMILY MEDICINE | Facility: CLINIC | Age: 89
End: 2024-04-03
Payer: MEDICARE

## 2024-04-03 DIAGNOSIS — J40 BRONCHITIS: Primary | ICD-10-CM

## 2024-04-03 PROCEDURE — 99422 OL DIG E/M SVC 11-20 MIN: CPT | Mod: ,,, | Performed by: STUDENT IN AN ORGANIZED HEALTH CARE EDUCATION/TRAINING PROGRAM

## 2024-04-03 RX ORDER — ALBUTEROL SULFATE 90 UG/1
2 AEROSOL, METERED RESPIRATORY (INHALATION) EVERY 6 HOURS PRN
Qty: 8.5 G | Refills: 0 | Status: SHIPPED | OUTPATIENT
Start: 2024-04-03 | End: 2024-06-05

## 2024-04-03 RX ORDER — GUAIFENESIN 600 MG/1
600 TABLET, EXTENDED RELEASE ORAL 2 TIMES DAILY
Qty: 20 TABLET | Refills: 0 | Status: SHIPPED | OUTPATIENT
Start: 2024-04-03 | End: 2024-04-13

## 2024-04-03 RX ORDER — AZITHROMYCIN 250 MG/1
TABLET, FILM COATED ORAL
Qty: 6 TABLET | Refills: 0 | Status: SHIPPED | OUTPATIENT
Start: 2024-04-03 | End: 2024-04-08

## 2024-04-03 RX ORDER — FLUTICASONE PROPIONATE 50 UG/1
1 POWDER, METERED RESPIRATORY (INHALATION) DAILY
Qty: 1 EACH | Refills: 0 | Status: SHIPPED | OUTPATIENT
Start: 2024-04-03 | End: 2024-04-22

## 2024-04-03 RX ORDER — BENZONATATE 100 MG/1
100 CAPSULE ORAL 3 TIMES DAILY PRN
Qty: 30 CAPSULE | Refills: 0 | Status: SHIPPED | OUTPATIENT
Start: 2024-04-03 | End: 2024-04-13

## 2024-04-03 RX ORDER — PROMETHAZINE HYDROCHLORIDE AND DEXTROMETHORPHAN HYDROBROMIDE 6.25; 15 MG/5ML; MG/5ML
5 SYRUP ORAL EVERY 4 HOURS PRN
Qty: 50 ML | Refills: 0 | Status: SHIPPED | OUTPATIENT
Start: 2024-04-03 | End: 2024-04-13

## 2024-04-03 NOTE — TELEPHONE ENCOUNTER
----- Message from Margarito Reinoso, Patient Care Assistant sent at 4/3/2024  7:47 AM CDT -----  Contact: Pt  Type:  RX Refill Request    Who Called:  Pt  Refill or New Rx:  New RX  RX Name and Strength:  Cough and Cold Symptoms Medication  How is the patient currently taking it? (ex. 1XDay):  As Directed  Is this a 30 day or 90 day RX:  30  Preferred Pharmacy with phone number:    [facility] #[id number] [place] [address] [address]  [address] [address]  Phone: [phone] Fax: [phone]  Local or Mail Order:  local  Ordering Provider:  Mike Ascencio Call Back Number:  [phone]  Additional Information:  Please contact pt upon completion-Thank you~

## 2024-04-03 NOTE — PROGRESS NOTES
[facility] Note    Subjective:    The HPI and pertinent ROS is included in the Diagnostic Impression Remarks section at the end of the note. Please see below for further details. Chief complaint is at end of note.     Mahsa is a pleasant intelligent patient who is here for evaluation.     Modified Medications    Modified Medication Previous Medication    FLUTICASONE PROPIONATE (FLOVENT DISKUS) 50 MCG/ACTUATION DSDV fluticasone propionate (FLOVENT DISKUS) 50 mcg/actuation DsDv       Inhale 1 spray into the lungs once daily. Controller    Inhale into the lungs. Controller       Data reviewed 274}  Previous medical records reviewed and summarized in plan section at end of note.      If you are due for any health screening(s) below please notify me so we can arrange them to be ordered and scheduled. Most healthy patients at your age complete them, but you are free to accept or refuse. If you can't do it, I'll definitely understand. If you can, I'd certainly appreciate it!     All of your core healthy metrics are met.      The following portions of the patient's history were reviewed and updated as appropriate: allergies, current medications, past family history, past medical history, past social history, past surgical history and problem list.    She  has a past medical history of Arthritis, Cataracts, bilateral, Osteopenia, and Thyroid disease.  She  has a past surgical history that includes Eye surgery; lamenectomy; Total replacement of both hip joints using computer-assisted navigation; Gallbladder surgery; Dilation and curettage of uterus; Hysteroscopy with dilation and curettage of uterus (N/A, 5/27/2019); Hysteroscopic surgical procedure (N/A, 5/27/2019); Cholecystectomy; Epidural steroid injection (N/A, 12/23/2020); Joint replacement (Bilateral); Robot-assisted subtotal hysterectomy (N/A, 11/9/2021); Robot-assisted laparoscopic abdominal sacrocolpopexy using da Lizet Xi (N/A, 11/9/2021);  Robot-assisted laparoscopic salpingo-oophorectomy using da Lizet Xi (Bilateral, 11/9/2021); and Hysterectomy.    She  reports that she has never smoked. She has been exposed to tobacco smoke. She has never used smokeless tobacco. She reports that she does not currently use alcohol. She reports that she does not use drugs.  She family history is not on file.    Review of patient's allergies indicates:   Allergen Reactions    Adhesive Rash    Morphine     Oxymetazoline hcl Rash    Sulfa (sulfonamide antibiotics) Nausea And Vomiting    Xylometazoline hcl        Tobacco Use: Low Risk  (3/12/2024)    Patient History     Smoking Tobacco Use: Never     Smokeless Tobacco Use: Never     Passive Exposure: Past     Physical Examination  General appearance: alert, cooperative, no distress  Neck: no thyromegaly, no neck stiffness  Lungs: clear to auscultation, no wheezes, rales or rhonchi, symmetric air entry  Heart: normal rate, regular rhythm, normal S1, S2, no murmurs, rubs, clicks or gallops  Abdomen: soft, nontender, nondistended, no rigidity, rebound, or guarding.   Back: no point tenderness over spine  Extremities: peripheral pulses normal, no unilateral leg swelling or calf tenderness   Neurological:alert, oriented, normal speech, no new focal findings or movement disorder noted from baseline    BP Readings from Last 3 Encounters:   03/12/24 136/68   12/19/23 126/60   11/30/23 (!) 142/64     Wt Readings from Last 3 Encounters:   03/12/24 79 kg (174 lb 3.2 oz)   12/19/23 80.4 kg (177 lb 3.2 oz)   12/11/23 80.7 kg (178 lb)     There were no vitals taken for this visit.   274}  Laboratory: I have reviewed old labs below:    274}    Lab Results   Component Value Date    WBC 7.24 11/10/2023    HGB 12.3 11/10/2023    HCT 36.9 (L) 11/10/2023    MCV 86 11/10/2023     11/10/2023     (L) 11/10/2023    K 3.9 11/10/2023    CL 95 11/10/2023    CALCIUM 9.2 11/10/2023    CO2 23 11/10/2023    GLU 99 11/10/2023    BUN 10  "11/10/2023    CREATININE 0.7 11/10/2023    EGFRNORACEVR >60 11/10/2023    ANIONGAP 11 11/10/2023    PROT 7.2 11/10/2023    ALBUMIN 4.2 11/10/2023    BILITOT 0.7 11/10/2023    ALKPHOS 58 11/10/2023    ALT 20 11/10/2023    AST 24 11/10/2023    INR 1.0 03/15/2020    CHOL 237 (H) 12/29/2022    TRIG 74 12/29/2022    HDL 74 12/29/2022    LDLCALC 148.2 12/29/2022    TSH 2.183 03/19/2024    HGBA1C 5.7 03/26/2021      Lab reviewed by me: Particular labs of significance that I will monitor, workup, or treat to improve are mentioned below in diagnostic impression remarks.    Imaging/EKG: I have reviewed the pertinent results and my findings are noted in remarks.  274}    CC:   Chief Complaint   Patient presents with    URI     Entered automatically based on patient selection in Patient Portal.        274}    Assessment/Plan  Mahsa Pedroza is a 89 y.o. female who presents to clinic with:  1. Bronchitis       274}  Diagnostic Impression Remarks + HPI     Documentation entered by me for this encounter may have been done in part using speech-recognition technology. Although I have made an effort to ensure accuracy, "sound like" errors may exist and should be interpreted in context.      Likely post viral bronchitis. Will treat    Additional workup planned: see labs ordered below.    See below for labs and meds ordered with associated diagnosis      1. Bronchitis  - benzonatate (TESSALON) 100 MG capsule; Take 1 capsule (100 mg total) by mouth 3 (three) times daily as needed for Cough.  Dispense: 30 capsule; Refill: 0  - promethazine-dextromethorphan (PROMETHAZINE-DM) 6.25-15 mg/5 mL Syrp; Take 5 mLs by mouth every 4 (four) hours as needed (cough).  Dispense: 50 mL; Refill: 0  - albuterol (VENTOLIN HFA) 90 mcg/actuation inhaler; Inhale 2 puffs into the lungs every 6 (six) hours as needed for Wheezing. Rescue  Dispense: 8.5 g; Refill: 0  - fluticasone propionate (FLOVENT DISKUS) 50 mcg/actuation DsDv; Inhale 1 spray into the " lungs once daily. Controller  Dispense: 1 each; Refill: 0  - guaiFENesin (MUCINEX) 600 mg 12 hr tablet; Take 1 tablet (600 mg total) by mouth 2 (two) times daily. for 10 days  Dispense: 20 tablet; Refill: 0  - azithromycin (Z-RANDAL) 250 MG tablet; Take 2 tablets by mouth on day 1; Take 1 tablet by mouth on days 2-5  Dispense: 6 tablet; Refill: 0      Javier Mckeon MD   274}    If you are due for any health screening(s) below please notify me so we can arrange them to be ordered and scheduled. Most healthy patients at your age complete them, but you are free to accept or refuse.     If you can't do it, I'll definitely understand. If you can, I'd certainly appreciate it!   All of your core healthy metrics are met.      Encounter 13 minutes

## 2024-04-22 ENCOUNTER — TELEPHONE (OUTPATIENT)
Dept: FAMILY MEDICINE | Facility: CLINIC | Age: 89
End: 2024-04-22
Payer: MEDICARE

## 2024-04-22 DIAGNOSIS — J84.89 OTHER SPECIFIED INTERSTITIAL PULMONARY DISEASES: Primary | ICD-10-CM

## 2024-04-22 RX ORDER — FLUTICASONE PROPIONATE AND SALMETEROL 250; 50 UG/1; UG/1
1 POWDER RESPIRATORY (INHALATION) 2 TIMES DAILY
Qty: 1 EACH | Refills: 3 | Status: SHIPPED | OUTPATIENT
Start: 2024-04-22 | End: 2024-06-05

## 2024-04-22 NOTE — TELEPHONE ENCOUNTER
Pt requesting Alternative RX for the flovent Diskus as denied by insurance    Pt requesting Advair

## 2024-04-22 NOTE — TELEPHONE ENCOUNTER
----- Message from Alyssa Rios, Patient Care Assistant sent at 4/22/2024 11:13 AM CDT -----  Regarding: advice  Contact: pt  Type: Needs Medical Advice    Who Called:  pt     Best Call Back Number: [phone] (home)     Additional Information: pt states she would like a callback regarding fluticasone propionate (FLOVENT DISKUS) 50 mcg/actuation DsDv. Please call to advise. Thanks!

## 2024-05-01 ENCOUNTER — OFFICE VISIT (OUTPATIENT)
Dept: FAMILY MEDICINE | Facility: CLINIC | Age: 89
End: 2024-05-01
Payer: MEDICARE

## 2024-05-01 ENCOUNTER — HOSPITAL ENCOUNTER (OUTPATIENT)
Dept: RADIOLOGY | Facility: HOSPITAL | Age: 89
Discharge: HOME | End: 2024-05-01
Attending: STUDENT IN AN ORGANIZED HEALTH CARE EDUCATION/TRAINING PROGRAM
Payer: MEDICARE

## 2024-05-01 VITALS
HEART RATE: 89 BPM | HEIGHT: 63 IN | OXYGEN SATURATION: 99 % | BODY MASS INDEX: 31.04 KG/M2 | SYSTOLIC BLOOD PRESSURE: 138 MMHG | DIASTOLIC BLOOD PRESSURE: 60 MMHG | WEIGHT: 175.19 LBS

## 2024-05-01 DIAGNOSIS — B96.89 BACTERIAL SINUSITIS: ICD-10-CM

## 2024-05-01 DIAGNOSIS — B96.89 BACTERIAL SINUSITIS: Primary | ICD-10-CM

## 2024-05-01 DIAGNOSIS — F41.9 ANXIETY: ICD-10-CM

## 2024-05-01 DIAGNOSIS — J32.9 BACTERIAL SINUSITIS: ICD-10-CM

## 2024-05-01 DIAGNOSIS — J32.9 BACTERIAL SINUSITIS: Primary | ICD-10-CM

## 2024-05-01 PROCEDURE — 99213 OFFICE O/P EST LOW 20 MIN: CPT | Mod: PBBFAC,25 | Performed by: STUDENT IN AN ORGANIZED HEALTH CARE EDUCATION/TRAINING PROGRAM

## 2024-05-01 PROCEDURE — 71046 X-RAY EXAM CHEST 2 VIEWS: CPT | Mod: 26,,, | Performed by: RADIOLOGY

## 2024-05-01 PROCEDURE — 99214 OFFICE O/P EST MOD 30 MIN: CPT | Mod: S$PBB,,, | Performed by: STUDENT IN AN ORGANIZED HEALTH CARE EDUCATION/TRAINING PROGRAM

## 2024-05-01 PROCEDURE — 71046 X-RAY EXAM CHEST 2 VIEWS: CPT | Mod: TC

## 2024-05-01 PROCEDURE — 99999 PR PBB SHADOW E&M-EST. PATIENT-LVL III: CPT | Mod: PBBFAC,,, | Performed by: STUDENT IN AN ORGANIZED HEALTH CARE EDUCATION/TRAINING PROGRAM

## 2024-05-01 RX ORDER — ESCITALOPRAM OXALATE 20 MG/1
20 TABLET ORAL DAILY
Qty: 90 TABLET | Refills: 3 | Status: SHIPPED | OUTPATIENT
Start: 2024-05-01 | End: 2024-06-05

## 2024-05-01 RX ORDER — ESCITALOPRAM OXALATE 10 MG/1
10 TABLET ORAL
COMMUNITY
Start: 2024-04-27 | End: 2024-05-01

## 2024-05-01 RX ORDER — BENZONATATE 200 MG/1
200 CAPSULE ORAL 3 TIMES DAILY PRN
Qty: 30 CAPSULE | Refills: 0 | Status: SHIPPED | OUTPATIENT
Start: 2024-05-01 | End: 2024-05-11

## 2024-05-01 RX ORDER — GUAIFENESIN AND DEXTROMETHORPHAN HYDROBROMIDE 10; 100 MG/5ML; MG/5ML
5 SYRUP ORAL EVERY 6 HOURS PRN
COMMUNITY
Start: 2024-05-01 | End: 2024-05-11

## 2024-05-01 RX ORDER — AMOXICILLIN AND CLAVULANATE POTASSIUM 875; 125 MG/1; MG/1
1 TABLET, FILM COATED ORAL 2 TIMES DAILY
Qty: 10 TABLET | Refills: 0 | Status: SHIPPED | OUTPATIENT
Start: 2024-05-01 | End: 2024-06-05

## 2024-05-01 NOTE — PROGRESS NOTES
[facility] Note    Subjective:    The HPI and pertinent ROS is included in the Diagnostic Impression Remarks section at the end of the note. Please see below for further details. Chief complaint is at end of note.     Mahsa is a pleasant intelligent patient who is here for evaluation.     Modified Medications    No medications on file       Data reviewed 274}  Previous medical records reviewed and summarized in plan section at end of note.      If you are due for any health screening(s) below please notify me so we can arrange them to be ordered and scheduled. Most healthy patients at your age complete them, but you are free to accept or refuse. If you can't do it, I'll definitely understand. If you can, I'd certainly appreciate it!     All of your core healthy metrics are met.      The following portions of the patient's history were reviewed and updated as appropriate: allergies, current medications, past family history, past medical history, past social history, past surgical history and problem list.    She  has a past medical history of Arthritis, Cataracts, bilateral, Osteopenia, and Thyroid disease.  She  has a past surgical history that includes Eye surgery; lamenectomy; Total replacement of both hip joints using computer-assisted navigation; Gallbladder surgery; Dilation and curettage of uterus; Hysteroscopy with dilation and curettage of uterus (N/A, 5/27/2019); Hysteroscopic surgical procedure (N/A, 5/27/2019); Cholecystectomy; Epidural steroid injection (N/A, 12/23/2020); Joint replacement (Bilateral); Robot-assisted subtotal hysterectomy (N/A, 11/9/2021); Robot-assisted laparoscopic abdominal sacrocolpopexy using da Lizet Xi (N/A, 11/9/2021); Robot-assisted laparoscopic salpingo-oophorectomy using da Lizet Xi (Bilateral, 11/9/2021); and Hysterectomy.    She  reports that she has never smoked. She has been exposed to tobacco smoke. She has never used smokeless tobacco. She reports that  "she does not currently use alcohol. She reports that she does not use drugs.  She family history is not on file.    Review of patient's allergies indicates:   Allergen Reactions    Adhesive Rash    Morphine     Oxymetazoline hcl Rash    Sulfa (sulfonamide antibiotics) Nausea And Vomiting    Xylometazoline hcl        Tobacco Use: Low Risk  (5/1/2024)    Patient History     Smoking Tobacco Use: Never     Smokeless Tobacco Use: Never     Passive Exposure: Past     Physical Examination  General appearance: alert, cooperative, no distress  Neck: no thyromegaly, no neck stiffness  Lungs: clear to auscultation, no wheezes, rales or rhonchi, symmetric air entry  Heart: normal rate, regular rhythm, normal S1, S2, no murmurs, rubs, clicks or gallops  Abdomen: soft, nontender, nondistended, no rigidity, rebound, or guarding.   Back: no point tenderness over spine  Extremities: peripheral pulses normal, no unilateral leg swelling or calf tenderness   Neurological:alert, oriented, normal speech, no new focal findings or movement disorder noted from baseline    BP Readings from Last 3 Encounters:   05/01/24 138/60   03/12/24 136/68   12/19/23 126/60     Wt Readings from Last 3 Encounters:   05/01/24 79.5 kg (175 lb 3.2 oz)   03/12/24 79 kg (174 lb 3.2 oz)   12/19/23 80.4 kg (177 lb 3.2 oz)     /60 (BP Location: Left arm, Patient Position: Sitting, BP Method: Large (Manual))   Pulse 89   Ht 5' 3" (1.6 m)   Wt 79.5 kg (175 lb 3.2 oz)   SpO2 99%   BMI 31.04 kg/m²    274}  Laboratory: I have reviewed old labs below:    274}    Lab Results   Component Value Date    WBC 7.24 11/10/2023    HGB 12.3 11/10/2023    HCT 36.9 (L) 11/10/2023    MCV 86 11/10/2023     11/10/2023     (L) 11/10/2023    K 3.9 11/10/2023    CL 95 11/10/2023    CALCIUM 9.2 11/10/2023    CO2 23 11/10/2023    GLU 99 11/10/2023    BUN 10 11/10/2023    CREATININE 0.7 11/10/2023    EGFRNORACEVR >60 11/10/2023    ANIONGAP 11 11/10/2023    PROT 7.2 " "11/10/2023    ALBUMIN 4.2 11/10/2023    BILITOT 0.7 11/10/2023    ALKPHOS 58 11/10/2023    ALT 20 11/10/2023    AST 24 11/10/2023    INR 1.0 03/15/2020    CHOL 237 (H) 12/29/2022    TRIG 74 12/29/2022    HDL 74 12/29/2022    LDLCALC 148.2 12/29/2022    TSH 2.183 03/19/2024    HGBA1C 5.7 03/26/2021      Lab reviewed by me: Particular labs of significance that I will monitor, workup, or treat to improve are mentioned below in diagnostic impression remarks.    Imaging/EKG: I have reviewed the pertinent results and my findings are noted in remarks.  274}    CC:   Chief Complaint   Patient presents with    Cough     Bronchitis not moved to a head cold        274}    Assessment/Plan  Mahsa Pedroza is a 89 y.o. female who presents to clinic with:  1. Bacterial sinusitis    2. Anxiety       274}  Diagnostic Impression Remarks + HPI     Documentation entered by me for this encounter may have been done in part using speech-recognition technology. Although I have made an effort to ensure accuracy, "sound like" errors may exist and should be interpreted in context.      Patient was sick a few weeks ago that improved with medications sent. However patient came down for productive cough, post nasal drip, congestion, and sinus pressure  Anxiety: patient agreed to increase lexapro to better control her mdd with recent stressors.    Additional workup planned: see labs ordered below.    See below for labs and meds ordered with associated diagnosis      1. Bacterial sinusitis  - benzonatate (TESSALON) 200 MG capsule; Take 1 capsule (200 mg total) by mouth 3 (three) times daily as needed for Cough.  Dispense: 30 capsule; Refill: 0  - amoxicillin-clavulanate 875-125mg (AUGMENTIN) 875-125 mg per tablet; Take 1 tablet by mouth 2 (two) times daily.  Dispense: 10 tablet; Refill: 0  - dextromethorphan-guaiFENesin  mg/5 ml (ROBITUSSIN-DM)  mg/5 mL liquid; Take 5 mLs by mouth every 6 (six) hours as needed (cough).  - X-Ray " Chest PA And Lateral; Future    2. Anxiety  - EScitalopram oxalate (LEXAPRO) 20 MG tablet; Take 1 tablet (20 mg total) by mouth once daily.  Dispense: 90 tablet; Refill: 3      Javier Mckeon MD   274}    If you are due for any health screening(s) below please notify me so we can arrange them to be ordered and scheduled. Most healthy patients at your age complete them, but you are free to accept or refuse.     If you can't do it, I'll definitely understand. If you can, I'd certainly appreciate it!   All of your core healthy metrics are met.

## 2024-05-17 ENCOUNTER — PATIENT MESSAGE (OUTPATIENT)
Dept: FAMILY MEDICINE | Facility: CLINIC | Age: 89
End: 2024-05-17
Payer: COMMERCIAL

## 2024-05-26 ENCOUNTER — OFFICE VISIT (OUTPATIENT)
Dept: URGENT CARE | Facility: CLINIC | Age: 89
End: 2024-05-26
Payer: MEDICARE

## 2024-05-26 VITALS
SYSTOLIC BLOOD PRESSURE: 132 MMHG | HEART RATE: 73 BPM | OXYGEN SATURATION: 96 % | WEIGHT: 174 LBS | HEIGHT: 64 IN | TEMPERATURE: 98 F | RESPIRATION RATE: 18 BRPM | DIASTOLIC BLOOD PRESSURE: 72 MMHG | BODY MASS INDEX: 29.71 KG/M2

## 2024-05-26 DIAGNOSIS — R30.0 DYSURIA: Primary | ICD-10-CM

## 2024-05-26 LAB
BILIRUB UR QL STRIP: NEGATIVE
GLUCOSE UR QL STRIP: NEGATIVE
KETONES UR QL STRIP: NEGATIVE
LEUKOCYTE ESTERASE UR QL STRIP: POSITIVE
PH, POC UA: 6.5
POC BLOOD, URINE: POSITIVE
POC NITRATES, URINE: NEGATIVE
PROT UR QL STRIP: POSITIVE
SP GR UR STRIP: 1.01 (ref 1–1.03)
UROBILINOGEN UR STRIP-ACNC: NORMAL (ref 0.1–1.1)

## 2024-05-26 PROCEDURE — 81003 URINALYSIS AUTO W/O SCOPE: CPT | Mod: QW,S$GLB,, | Performed by: NURSE PRACTITIONER

## 2024-05-26 PROCEDURE — 99203 OFFICE O/P NEW LOW 30 MIN: CPT | Mod: S$GLB,,, | Performed by: NURSE PRACTITIONER

## 2024-05-26 RX ORDER — NITROFURANTOIN 25; 75 MG/1; MG/1
100 CAPSULE ORAL 2 TIMES DAILY
Qty: 14 CAPSULE | Refills: 0 | Status: SHIPPED | OUTPATIENT
Start: 2024-05-26 | End: 2024-06-02

## 2024-05-26 RX ORDER — PHENAZOPYRIDINE HYDROCHLORIDE 200 MG/1
200 TABLET, FILM COATED ORAL 3 TIMES DAILY PRN
Qty: 6 TABLET | Refills: 0 | Status: SHIPPED | OUTPATIENT
Start: 2024-05-26 | End: 2024-06-05

## 2024-05-26 NOTE — PATIENT INSTRUCTIONS
You must understand that you've received an Urgent Care treatment only and that you may be released before all your medical problems are known or treated. You, the patient, will arrange for follow up care as instructed.  Follow up with your PCP or specialty clinic as directed in the next 1-2 weeks if not improved or as needed.  You can call [phone] to schedule an appointment with the appropriate provider.  If your condition worsens we recommend that you receive another evaluation at the emergency room immediately or contact your primary medical clinics after hours call service to discuss your concerns.  Please return here or go to the Emergency Department for any concerns or worsening of condition.  Please if you smoke please consider quitting. [facility] Smoke cessation hotline number is [phone], available at this number is free counseling and medications to live a healthier life!       If you were prescribed a narcotic or controlled medication, do not drive or operate heavy equipment or machinery while taking these medications.    If you were not prescribed an antibiotic and your not better please return for a recheck. Antibiotic therapy is not always indicated initially.   Please attempt over the counter medications, give it time and try Echinacea, Zinc and Vitamin C to fight common colds and virus.

## 2024-05-26 NOTE — PROGRESS NOTES
"Subjective:       Patient ID: Mahsa Pedroza is a 89 y.o. female.    Vitals:  height is 5' 4" (1.626 m) and weight is 78.9 kg (174 lb). Her oral temperature is 98 °F (36.7 °C). Her blood pressure is 132/72 and her pulse is 73. Her respiration is 18 and oxygen saturation is 96%.     Chief Complaint: Dysuria    This is a 89 y.o. female who presents today with a chief complaint of possible UTI.  Painful urination x several days.  Odor to urine per patient.   No chief complaint on file.        Dysuria   This is a new problem. The current episode started in the past 7 days. The problem occurs every urination. The problem has been gradually worsening. The quality of the pain is described as aching and burning. The pain is at a severity of 2/10. The pain is mild. There has been no fever. There is No history of pyelonephritis. Associated symptoms include chills and urgency. She has tried nothing for the symptoms. Her past medical history is significant for hypertension.       Constitution: Positive for chills.   Genitourinary:  Positive for dysuria and urgency.           Objective:      Physical Exam   Constitutional: She is oriented to person, place, and time. She appears well-developed.   HENT:   Head: Normocephalic and atraumatic.   Ears:   Right Ear: External ear normal.   Left Ear: External ear normal.   Nose: Nose normal.   Mouth/Throat: Mucous membranes are normal.   Eyes: Conjunctivae and lids are normal.   Neck: Trachea normal. Neck supple.   Cardiovascular: Normal rate, regular rhythm and normal heart sounds.   Pulmonary/Chest: Effort normal and breath sounds normal. No respiratory distress.   Abdominal: Normal appearance and bowel sounds are normal. She exhibits no distension and no mass. Soft. There is no abdominal tenderness.   Musculoskeletal: Normal range of motion.         General: Normal range of motion.   Neurological: She is alert and oriented to person, place, and time. She has normal strength. "   Skin: Skin is warm, dry, intact, not diaphoretic and not pale.   Psychiatric: Her speech is normal and behavior is normal. Judgment and thought content normal.   Nursing note and vitals reviewed.        Past medical history and current medications reviewed.     Results for orders placed or performed in visit on 05/26/24   POCT Urinalysis, Dipstick, Automated, W/O Scope   Result Value Ref Range    POC Blood, Urine Positive (A) Negative    POC Bilirubin, Urine Negative Negative    POC Urobilinogen, Urine Normal 0.1 - 1.1    POC Ketones, Urine Negative Negative    POC Protein, Urine Positive (A) Negative    POC Nitrates, Urine Negative Negative    POC Glucose, Urine Negative Negative    pH, UA 6.5     POC Specific Gravity, Urine 1.010 1.003 - 1.029    POC Leukocytes, Urine Positive (A) Negative         Assessment:           1. Dysuria              Plan:         Dysuria  -     POCT Urinalysis, Dipstick, Automated, W/O Scope  -     nitrofurantoin, macrocrystal-monohydrate, (MACROBID) 100 MG capsule; Take 1 capsule (100 mg total) by mouth 2 (two) times daily. for 7 days  Dispense: 14 capsule; Refill: 0  -     phenazopyridine (PYRIDIUM) 200 MG tablet; Take 1 tablet (200 mg total) by mouth 3 (three) times daily as needed for Pain.  Dispense: 6 tablet; Refill: 0             Patient Instructions     You must understand that you've received an Urgent Care treatment only and that you may be released before all your medical problems are known or treated. You, the patient, will arrange for follow up care as instructed.  Follow up with your PCP or specialty clinic as directed in the next 1-2 weeks if not improved or as needed.  You can call [phone] to schedule an appointment with the appropriate provider.  If your condition worsens we recommend that you receive another evaluation at the emergency room immediately or contact your primary medical clinics after hours call service to discuss your concerns.  Please return here  or go to the Emergency Department for any concerns or worsening of condition.  Please if you smoke please consider quitting. [facility] Smoke cessation hotline number is [phone], available at this number is free counseling and medications to live a healthier life!       If you were prescribed a narcotic or controlled medication, do not drive or operate heavy equipment or machinery while taking these medications.    If you were not prescribed an antibiotic and your not better please return for a recheck. Antibiotic therapy is not always indicated initially.   Please attempt over the counter medications, give it time and try Echinacea, Zinc and Vitamin C to fight common colds and virus.

## 2024-06-12 ENCOUNTER — HOSPITAL ENCOUNTER (EMERGENCY)
Facility: HOSPITAL | Age: 89
Discharge: HOME | End: 2024-06-12
Attending: STUDENT IN AN ORGANIZED HEALTH CARE EDUCATION/TRAINING PROGRAM
Payer: COMMERCIAL

## 2024-06-12 VITALS
OXYGEN SATURATION: 100 % | TEMPERATURE: 98 F | BODY MASS INDEX: 29.37 KG/M2 | RESPIRATION RATE: 15 BRPM | DIASTOLIC BLOOD PRESSURE: 71 MMHG | WEIGHT: 172 LBS | SYSTOLIC BLOOD PRESSURE: 162 MMHG | HEIGHT: 64 IN | HEART RATE: 78 BPM

## 2024-06-12 DIAGNOSIS — T14.90XA TRAUMA: ICD-10-CM
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
## 2024-06-12 DIAGNOSIS — N30.00 ACUTE CYSTITIS WITHOUT HEMATURIA: Primary | ICD-10-CM

## 2024-06-12 DIAGNOSIS — R07.9 CHEST PAIN: ICD-10-CM

## 2024-06-12 LAB
ALBUMIN SERPL BCP-MCNC: 3.3 G/DL (ref 3.5–5.2)
ALP SERPL-CCNC: 74 U/L (ref 55–135)
ALT SERPL W/O P-5'-P-CCNC: 14 U/L (ref 10–44)
ANION GAP SERPL CALC-SCNC: 10 MMOL/L (ref 8–16)
AST SERPL-CCNC: 25 U/L (ref 10–40)
BACTERIA #/AREA URNS HPF: ABNORMAL /HPF
BASOPHILS # BLD AUTO: 0.03 K/UL (ref 0–0.2)
BASOPHILS NFR BLD: 0.3 % (ref 0–1.9)
BILIRUB SERPL-MCNC: 0.6 MG/DL (ref 0.1–1)
BILIRUB UR QL STRIP: NEGATIVE
BNP SERPL-MCNC: 35 PG/ML (ref 0–99)
BUN SERPL-MCNC: 18 MG/DL (ref 8–23)
CALCIUM SERPL-MCNC: 9 MG/DL (ref 8.7–10.5)
CHLORIDE SERPL-SCNC: 95 MMOL/L (ref 95–110)
CLARITY UR: ABNORMAL
CO2 SERPL-SCNC: 22 MMOL/L (ref 23–29)
COLOR UR: YELLOW
CREAT SERPL-MCNC: 0.8 MG/DL (ref 0.5–1.4)
DIFFERENTIAL METHOD BLD: ABNORMAL
EOSINOPHIL # BLD AUTO: 0.1 K/UL (ref 0–0.5)
EOSINOPHIL NFR BLD: 1.6 % (ref 0–8)
ERYTHROCYTE [DISTWIDTH] IN BLOOD BY AUTOMATED COUNT: 13 % (ref 11.5–14.5)
EST. GFR  (NO RACE VARIABLE): >60 ML/MIN/1.73 M^2
GLUCOSE SERPL-MCNC: 108 MG/DL (ref 70–110)
GLUCOSE UR QL STRIP: NEGATIVE
HCT VFR BLD AUTO: 34 % (ref 37–48.5)
HCV AB SERPL QL IA: NORMAL
HGB BLD-MCNC: 11.7 G/DL (ref 12–16)
HGB UR QL STRIP: ABNORMAL
HIV 1+2 AB+HIV1 P24 AG SERPL QL IA: NORMAL
IMM GRANULOCYTES # BLD AUTO: 0.05 K/UL (ref 0–0.04)
IMM GRANULOCYTES NFR BLD AUTO: 0.6 % (ref 0–0.5)
KETONES UR QL STRIP: ABNORMAL
LEUKOCYTE ESTERASE UR QL STRIP: ABNORMAL
LYMPHOCYTES # BLD AUTO: 3 K/UL (ref 1–4.8)
LYMPHOCYTES NFR BLD: 34 % (ref 18–48)
MCH RBC QN AUTO: 28.7 PG (ref 27–31)
MCHC RBC AUTO-ENTMCNC: 34.4 G/DL (ref 32–36)
MCV RBC AUTO: 83 FL (ref 82–98)
MICROSCOPIC COMMENT: ABNORMAL
MONOCYTES # BLD AUTO: 1 K/UL (ref 0.3–1)
MONOCYTES NFR BLD: 10.9 % (ref 4–15)
NEUTROPHILS # BLD AUTO: 4.6 K/UL (ref 1.8–7.7)
NEUTROPHILS NFR BLD: 52.6 % (ref 38–73)
NITRITE UR QL STRIP: NEGATIVE
NRBC BLD-RTO: 0 /100 WBC
OHS QRS DURATION: 88 MS
OHS QTC CALCULATION: 412 MS
PH UR STRIP: 7 [PH] (ref 5–8)
PLATELET # BLD AUTO: 278 K/UL (ref 150–450)
PMV BLD AUTO: 9.4 FL (ref 9.2–12.9)
POTASSIUM SERPL-SCNC: 4.1 MMOL/L (ref 3.5–5.1)
PROT SERPL-MCNC: 6.8 G/DL (ref 6–8.4)
PROT UR QL STRIP: NEGATIVE
RBC # BLD AUTO: 4.08 M/UL (ref 4–5.4)
RBC #/AREA URNS HPF: 3 /HPF (ref 0–4)
SODIUM SERPL-SCNC: 127 MMOL/L (ref 136–145)
SP GR UR STRIP: 1.01 (ref 1–1.03)
SQUAMOUS #/AREA URNS HPF: 3 /HPF
TROPONIN I SERPL DL<=0.01 NG/ML-MCNC: <0.006 NG/ML (ref 0–0.03)
URN SPEC COLLECT METH UR: ABNORMAL
UROBILINOGEN UR STRIP-ACNC: NEGATIVE EU/DL
WBC # BLD AUTO: 8.68 K/UL (ref 3.9–12.7)
WBC #/AREA URNS HPF: 15 /HPF (ref 0–5)

## 2024-06-12 PROCEDURE — 86803 HEPATITIS C AB TEST: CPT | Performed by: STUDENT IN AN ORGANIZED HEALTH CARE EDUCATION/TRAINING PROGRAM

## 2024-06-12 PROCEDURE — 70450 CT HEAD/BRAIN W/O DYE: CPT | Mod: TC

## 2024-06-12 PROCEDURE — 94761 N-INVAS EAR/PLS OXIMETRY MLT: CPT

## 2024-06-12 PROCEDURE — 81000 URINALYSIS NONAUTO W/SCOPE: CPT | Performed by: STUDENT IN AN ORGANIZED HEALTH CARE EDUCATION/TRAINING PROGRAM

## 2024-06-12 PROCEDURE — 80053 COMPREHEN METABOLIC PANEL: CPT | Performed by: STUDENT IN AN ORGANIZED HEALTH CARE EDUCATION/TRAINING PROGRAM

## 2024-06-12 PROCEDURE — 99285 EMERGENCY DEPT VISIT HI MDM: CPT | Mod: 25

## 2024-06-12 PROCEDURE — 84484 ASSAY OF TROPONIN QUANT: CPT | Performed by: STUDENT IN AN ORGANIZED HEALTH CARE EDUCATION/TRAINING PROGRAM

## 2024-06-12 PROCEDURE — 93010 ELECTROCARDIOGRAM REPORT: CPT | Mod: ,,, | Performed by: INTERNAL MEDICINE

## 2024-06-12 PROCEDURE — 73130 X-RAY EXAM OF HAND: CPT | Mod: TC,RT

## 2024-06-12 PROCEDURE — 87086 URINE CULTURE/COLONY COUNT: CPT | Performed by: STUDENT IN AN ORGANIZED HEALTH CARE EDUCATION/TRAINING PROGRAM

## 2024-06-12 PROCEDURE — 72125 CT NECK SPINE W/O DYE: CPT | Mod: TC

## 2024-06-12 PROCEDURE — 87186 SC STD MICRODIL/AGAR DIL: CPT | Performed by: STUDENT IN AN ORGANIZED HEALTH CARE EDUCATION/TRAINING PROGRAM

## 2024-06-12 PROCEDURE — 83880 ASSAY OF NATRIURETIC PEPTIDE: CPT | Performed by: STUDENT IN AN ORGANIZED HEALTH CARE EDUCATION/TRAINING PROGRAM

## 2024-06-12 PROCEDURE — 71110 X-RAY EXAM RIBS BIL 3 VIEWS: CPT | Mod: TC

## 2024-06-12 PROCEDURE — 87389 HIV-1 AG W/HIV-1&-2 AB AG IA: CPT | Performed by: STUDENT IN AN ORGANIZED HEALTH CARE EDUCATION/TRAINING PROGRAM

## 2024-06-12 PROCEDURE — 85025 COMPLETE CBC W/AUTO DIFF WBC: CPT | Performed by: STUDENT IN AN ORGANIZED HEALTH CARE EDUCATION/TRAINING PROGRAM

## 2024-06-12 PROCEDURE — 73130 X-RAY EXAM OF HAND: CPT | Mod: 26,RT,, | Performed by: RADIOLOGY

## 2024-06-12 PROCEDURE — 87077 CULTURE AEROBIC IDENTIFY: CPT | Performed by: STUDENT IN AN ORGANIZED HEALTH CARE EDUCATION/TRAINING PROGRAM

## 2024-06-12 PROCEDURE — 93005 ELECTROCARDIOGRAM TRACING: CPT

## 2024-06-12 PROCEDURE — 25000003 PHARM REV CODE 250: Performed by: STUDENT IN AN ORGANIZED HEALTH CARE EDUCATION/TRAINING PROGRAM

## 2024-06-12 PROCEDURE — 71045 X-RAY EXAM CHEST 1 VIEW: CPT | Mod: TC

## 2024-06-12 PROCEDURE — 87088 URINE BACTERIA CULTURE: CPT | Performed by: STUDENT IN AN ORGANIZED HEALTH CARE EDUCATION/TRAINING PROGRAM

## 2024-06-12 RX ORDER — ACETAMINOPHEN 500 MG
1000 TABLET ORAL
Status: COMPLETED | OUTPATIENT
Start: 2024-06-12 | End: 2024-06-12

## 2024-06-12 RX ORDER — NITROFURANTOIN 25; 75 MG/1; MG/1
100 CAPSULE ORAL 2 TIMES DAILY
Qty: 10 CAPSULE | Refills: 0 | Status: SHIPPED | OUTPATIENT
Start: 2024-06-12 | End: 2024-06-18 | Stop reason: ALTCHOICE

## 2024-06-12 RX ADMIN — ACETAMINOPHEN 1000 MG: 500 TABLET ORAL at 03:06

## 2024-06-12 NOTE — ED PROVIDER NOTES
Encounter Date: 6/12/2024       History     Chief Complaint   Patient presents with    Fall    Chest Pain     89-year-old female with a history of arthritis, thyroid disease osteopenia.   She presents to ED via EMS chief complaints of mechanical fall and chest pain.  Patient ambulates with a roller walker, she was walking twisted and then fell from a standing position.   She reports anterior chest wall chest pain after the fall. She hit her head, no associated LOC.  EMS gave a dose of nitro as well as aspirin.   Patient also reports one-week history of dysuria.    The history is provided by the patient and the EMS personnel. No  was used.     Review of patient's allergies indicates:   Allergen Reactions    Adhesive Rash    Morphine     Oxymetazoline hcl Rash    Sulfa (sulfonamide antibiotics) Nausea And Vomiting    Xylometazoline hcl      Past Medical History:   Diagnosis Date    Arthritis     Cataracts, bilateral     Osteopenia     Thyroid disease      Past Surgical History:   Procedure Laterality Date    CHOLECYSTECTOMY      DILATION AND CURETTAGE OF UTERUS      EPIDURAL STEROID INJECTION N/A 12/23/2020    Procedure: RACHAEL Caudal;  Surgeon: Jim Ugalde MD;  Location: [facility] OR;  Service: Pain Management;  Laterality: N/A;    EYE SURGERY      GALLBLADDER SURGERY      HYSTERECTOMY      HYSTEROSCOPIC SURGICAL PROCEDURE N/A 5/27/2019    Procedure: HYSTEROSCOPY, THERAPEUTIC;  Surgeon: Gordon Cedillo MD;  Location: [facility] OR;  Service: OB/GYN;  Laterality: N/A;  mysoure    HYSTEROSCOPY WITH DILATION AND CURETTAGE OF UTERUS N/A 5/27/2019    Procedure: HYSTEROSCOPY, WITH DILATION AND CURETTAGE OF UTERUS;  Surgeon: Gordon Cedillo MD;  Location: [facility] OR;  Service: OB/GYN;  Laterality: N/A;    JOINT REPLACEMENT Bilateral     Hips    lamenectomy      ROBOT-ASSISTED LAPAROSCOPIC ABDOMINAL SACROCOLPOPEXY USING DA DANETTE XI N/A 11/9/2021    Procedure: XI ROBOTIC SACROCOLPOPEXY, ABDOMINAL;  Surgeon:  Reza Napoles MD;  Location: [facility] OR;  Service: OB/GYN;  Laterality: N/A;    ROBOT-ASSISTED LAPAROSCOPIC SALPINGO-OOPHORECTOMY USING DA DANETTE XI Bilateral 11/9/2021    Procedure: XI ROBOTIC SALPINGO-OOPHORECTOMY;  Surgeon: Reza Napoles MD;  Location: [facility] OR;  Service: OB/GYN;  Laterality: Bilateral;    ROBOT-ASSISTED SUBTOTAL HYSTERECTOMY N/A 11/9/2021    Procedure: ROBOTIC HYSTERECTOMY, SUPRACERVICAL;  Surgeon: Reza Napoles MD;  Location: [facility] OR;  Service: OB/GYN;  Laterality: N/A;    TOTAL REPLACEMENT OF BOTH HIP JOINTS USING COMPUTER-ASSISTED NAVIGATION       Family History   Problem Relation Name Age of Onset    Breast cancer Neg Hx       Social History     Tobacco Use    Smoking status: Never     Passive exposure: Past    Smokeless tobacco: Never   Substance Use Topics    Alcohol use: Not Currently    Drug use: Never     Review of Systems   Constitutional: Negative.    HENT: Negative.     Eyes: Negative.    Respiratory: Negative.     Cardiovascular:  Positive for chest pain.   Gastrointestinal: Negative.    Endocrine: Negative.    Genitourinary: Negative.    Musculoskeletal:  Positive for arthralgias and myalgias.   Skin: Negative.    Allergic/Immunologic: Negative.    Neurological: Negative.    Hematological: Negative.    All other systems reviewed and are negative.      Physical Exam     Initial Vitals [06/12/24 1244]   BP Pulse Resp Temp SpO2   (!) 186/101 82 20 98 °F (36.7 °C) 95 %      MAP       --         Physical Exam    Nursing note and vitals reviewed.  Constitutional: She appears well-developed and well-nourished.   HENT:   Head: Normocephalic and atraumatic.   Eyes: EOM are normal. Pupils are equal, round, and reactive to light.   Neck:   Normal range of motion.  Cardiovascular:  Normal rate.           Pulmonary/Chest: Breath sounds normal. No respiratory distress. She has no wheezes.   Abdominal: Abdomen is soft.   Musculoskeletal:         General: Normal range of motion.      Cervical  back: Normal range of motion.      Comments: TTP anterior chest wall, no crepitus, or obvious deformity  TTP right anterior wrist.  No obvious deformity.     Neurological: She is alert and oriented to person, place, and time. She has normal strength. GCS score is 15. GCS eye subscore is 4. GCS verbal subscore is 5. GCS motor subscore is 6.   Skin: Skin is warm.   Psychiatric: She has a normal mood and affect.         ED Course   Procedures  Labs Reviewed   CBC W/ AUTO DIFFERENTIAL - Abnormal; Notable for the following components:       Result Value    Hemoglobin 11.7 (*)     Hematocrit 34.0 (*)     Immature Granulocytes 0.6 (*)     Immature Grans (Abs) 0.05 (*)     All other components within normal limits    Narrative:     Release to patient->Immediate   COMPREHENSIVE METABOLIC PANEL - Abnormal; Notable for the following components:    Sodium 127 (*)     CO2 22 (*)     Albumin 3.3 (*)     All other components within normal limits    Narrative:     Release to patient->Immediate   URINALYSIS, REFLEX TO URINE CULTURE - Abnormal; Notable for the following components:    Appearance, UA Hazy (*)     Ketones, UA 1+ (*)     Occult Blood UA Trace (*)     Leukocytes, UA 2+ (*)     All other components within normal limits    Narrative:     Preferred Collection Type->Urine, Clean Catch  Specimen Source->Urine   URINALYSIS MICROSCOPIC - Abnormal; Notable for the following components:    WBC, UA 15 (*)     Bacteria Moderate (*)     All other components within normal limits    Narrative:     Preferred Collection Type->Urine, Clean Catch  Specimen Source->Urine   CULTURE, URINE   TROPONIN I    Narrative:     Release to patient->Immediate   B-TYPE NATRIURETIC PEPTIDE    Narrative:     Release to patient->Immediate   TROPONIN I   HIV 1 / 2 ANTIBODY   HEPATITIS C ANTIBODY        ECG Results              EKG 12-lead (In process)        Collection Time Result Time QRS Duration OHS QTC Calculation    06/12/24 12:47:10 06/12/24 14:25:37  88 412                     In process by Interface, Lab In [facility] (06/12/24 14:25:45)                   Narrative:    Test Reason : R55    Vent. Rate : 078 BPM     Atrial Rate : 078 BPM     P-R Int : 200 ms          QRS Dur : 088 ms      QT Int : 362 ms       P-R-T Axes : 062 047 042 degrees     QTc Int : 412 ms    Normal sinus rhythm  Normal ECG  When compared with ECG of 07-NOV-2023 16:12,  No significant change was found    Referred By: [email or web address]   SELF           Confirmed By:                                   Imaging Results              X-Ray Ribs 3 Views Bilateral (Final result)  Result time 06/12/24 13:55:20      Final result by Radha Cardona MD (06/12/24 13:55:20)                   Impression:      No acute abnormality.      Electronically signed by: Radha Cardona  Date:    06/12/2024  Time:    13:55               Narrative:    EXAMINATION:  XR RIBS 3 VIEWS BILATERAL    CLINICAL HISTORY:  Injury, unspecified, initial encounter; anterior rib pain    TECHNIQUE:  Multiple views of the bilateral ribs were obtained    COMPARISON:  Chest x-ray today, CT 07/08/2023    FINDINGS:  There is no pneumothorax.  There is chronic pleural thickening at the right lateral costophrenic sulcus.  No pleural effusion is noted.  No rib fracture is identified.  There are degenerative changes in the spine and at the shoulders.                                       X-Ray Hand 3 view Right (Final result)  Result time 06/12/24 13:50:27      Final result by Reagan Calabrese MD (06/12/24 13:50:27)                   Impression:      Mild-to-moderate chronic changes of degenerative osteoarthrosis most predominant within the 1st CMC joint.      Electronically signed by: Reagan Calabrese  Date:    06/12/2024  Time:    13:50               Narrative:    EXAMINATION:  XR HAND COMPLETE 3 VIEW RIGHT    CLINICAL HISTORY:  hand pain.;    TECHNIQUE:  PA, lateral, and oblique views of the right hand were  performed.    COMPARISON:  12/11/2023.    FINDINGS:  Mild chronic degenerative osteoarthrosis involving the MCP joints and the IP joints of the digits.  This is again most predominant within the 2nd MCP joint.    Carpal bones are intact with mild to moderate degenerative osteoarthrosis most predominant within the 1st CMC joint.  Distal radius and ulna intact.  Mild ulnar minus variant.    There is bone demineralization.                                       X-Ray Chest AP Portable (Final result)  Result time 06/12/24 13:51:49      Final result by Radha Cardona MD (06/12/24 13:51:49)                   Impression:      No acute abnormality.      Electronically signed by: Radha Cardona  Date:    06/12/2024  Time:    13:51               Narrative:    EXAMINATION:  XR CHEST AP PORTABLE    CLINICAL HISTORY:  Chest Pain;    TECHNIQUE:  Single frontal view of the chest was performed.    COMPARISON:  05/01/2024    FINDINGS:  The cardiomediastinal silhouette is within normal limits.  Mild interstitial prominence in the lung bases appears similar to previous studies, with findings suggestive of chronic interstitial lung disease on prior CT.  There is no focal consolidation or pulmonary edema.  There is slight blunting of the right lateral costophrenic sulcus, likely related to pleural thickening, unchanged dating back to at least 08/07/2022.  There is no pneumothorax.                                       CT Cervical Spine Without Contrast (Final result)  Result time 06/12/24 13:33:41      Final result by Radha Cardona MD (06/12/24 13:33:41)                   Impression:      No acute traumatic abnormality.      Electronically signed by: Radha Cardona  Date:    06/12/2024  Time:    13:33               Narrative:    EXAMINATION:  CT CERVICAL SPINE WITHOUT CONTRAST    CLINICAL HISTORY:  Neck trauma (Age >= 65y);    TECHNIQUE:  Low dose axial images, sagittal and coronal reformations were performed  though the cervical spine.  Contrast was not administered.    COMPARISON:  None    FINDINGS:  There is no hematoma formation in the soft tissues in the neck.    The skull base is intact.    There is no fracture in the cervical spine.    There is normal alignment of the cervical vertebra.    None there is multi level disc space narrowing and osteophyte formation, particularly from C4/5 through C7/T1, with mild spinal stenosis secondary to disc osteophyte complexes.  There are extensive bilateral facet degenerative changes producing multilevel neural foraminal narrowing.    There are interstitial changes in the lung apices which are similar to the prior chest CT of 07/08/2023.                                       CT Head Without Contrast (Final result)  Result time 06/12/24 13:29:19      Final result by Radha Cardona MD (06/12/24 13:29:19)                   Impression:      No acute abnormality and no significant interval changes.      Electronically signed by: Radha Cardona  Date:    06/12/2024  Time:    13:29               Narrative:    EXAMINATION:  CT HEAD WITHOUT CONTRAST    CLINICAL HISTORY:  Head trauma, minor (Age >= 65y);    TECHNIQUE:  Low dose axial images were obtained through the head.  Coronal and sagittal reformations were also performed. Contrast was not administered.    COMPARISON:  08/07/2022    FINDINGS:  There is no intra or extra-axial hemorrhage.  No mass effect, edema or midline shift is present.  The ventricular system and cortical sulcal markings are appropriate for the patient's age.  There is no acute or chronic infarction.    There is no skull fracture.  The visualized paranasal sinuses are clear.    There is marked hyperostosis of the frontal bone, similar to the previous study.                                       Medications   acetaminophen tablet 1,000 mg (has no administration in time range)     Medical Decision Making  89-year-old female presents status post mechanical  fall.  Hit her head.  No LOC. also reports dysuria  Ddx head bleed, fracture, contusion  CT head, CT cervical spine shows no acute injury  X-rays showed no acute injury  UA positive for UTI.  Rx Macrobid.  Advised NSAIDs p.r.n. for musculoskeletal pain  Patient was instructed to follow up with PCP and was given strict return precautions to the ED. The patient voiced understanding and agreed with the plan      Amount and/or Complexity of Data Reviewed  Labs: ordered.  Radiology: ordered.    Risk  OTC drugs.  Prescription drug management.                                      Clinical Impression:  Final diagnoses:  [R07.9] Chest pain  [T14.90XA] Trauma  [T14.90XA] Trauma - Anterior rib pain  [N30.00] Acute cystitis without hematuria (Primary)  [W19.XXXA] Fall, initial encounter          ED Disposition Condition    Discharge Stable          ED Prescriptions       Medication Sig Dispense Start Date End Date Auth. Provider    nitrofurantoin, macrocrystal-monohydrate, (MACROBID) 100 MG capsule Take 1 capsule (100 mg total) by mouth 2 (two) times daily. for 5 days 10 capsule 6/12/2024 6/17/2024 Ian La MD          Follow-up Information       Follow up With Specialties Details Why Contact Info    Javier Mckeon MD Family Medicine   [address] [facility] [place] [address] [phone]               Ian La MD  06/12/24 [id number]       Ian La MD  06/12/24 9843

## 2024-06-15 LAB — BACTERIA UR CULT: ABNORMAL

## 2024-06-18 ENCOUNTER — OFFICE VISIT (OUTPATIENT)
Dept: FAMILY MEDICINE | Facility: CLINIC | Age: 89
End: 2024-06-18
Payer: COMMERCIAL

## 2024-06-18 VITALS
HEIGHT: 64 IN | HEART RATE: 60 BPM | BODY MASS INDEX: 28.92 KG/M2 | SYSTOLIC BLOOD PRESSURE: 120 MMHG | DIASTOLIC BLOOD PRESSURE: 52 MMHG | WEIGHT: 169.38 LBS | OXYGEN SATURATION: 96 %

## 2024-06-18 DIAGNOSIS — I10 ESSENTIAL HYPERTENSION: ICD-10-CM
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
## 2024-06-18 DIAGNOSIS — R30.0 DYSURIA: Primary | ICD-10-CM

## 2024-06-18 DIAGNOSIS — F41.9 ANXIETY: ICD-10-CM

## 2024-06-18 LAB
BACTERIA #/AREA URNS HPF: ABNORMAL /HPF
BILIRUB UR QL STRIP: NEGATIVE
CLARITY UR: CLEAR
COLOR UR: YELLOW
GLUCOSE UR QL STRIP: NEGATIVE
HGB UR QL STRIP: NEGATIVE
KETONES UR QL STRIP: NEGATIVE
LEUKOCYTE ESTERASE UR QL STRIP: ABNORMAL
MICROSCOPIC COMMENT: ABNORMAL
NITRITE UR QL STRIP: NEGATIVE
PH UR STRIP: 7 [PH] (ref 5–8)
PROT UR QL STRIP: NEGATIVE
RBC #/AREA URNS HPF: 0 /HPF (ref 0–4)
SP GR UR STRIP: 1.01 (ref 1–1.03)
URN SPEC COLLECT METH UR: ABNORMAL
UROBILINOGEN UR STRIP-ACNC: NEGATIVE EU/DL
WBC #/AREA URNS HPF: 6 /HPF (ref 0–5)

## 2024-06-18 PROCEDURE — 99214 OFFICE O/P EST MOD 30 MIN: CPT | Mod: S$GLB,,, | Performed by: STUDENT IN AN ORGANIZED HEALTH CARE EDUCATION/TRAINING PROGRAM

## 2024-06-18 PROCEDURE — 99999 PR PBB SHADOW E&M-EST. PATIENT-LVL IV: CPT | Mod: PBBFAC,,, | Performed by: STUDENT IN AN ORGANIZED HEALTH CARE EDUCATION/TRAINING PROGRAM

## 2024-06-18 PROCEDURE — 1159F MED LIST DOCD IN RCRD: CPT | Mod: CPTII,S$GLB,, | Performed by: STUDENT IN AN ORGANIZED HEALTH CARE EDUCATION/TRAINING PROGRAM

## 2024-06-18 PROCEDURE — 1126F AMNT PAIN NOTED NONE PRSNT: CPT | Mod: CPTII,S$GLB,, | Performed by: STUDENT IN AN ORGANIZED HEALTH CARE EDUCATION/TRAINING PROGRAM

## 2024-06-18 PROCEDURE — 81000 URINALYSIS NONAUTO W/SCOPE: CPT | Performed by: STUDENT IN AN ORGANIZED HEALTH CARE EDUCATION/TRAINING PROGRAM

## 2024-06-18 PROCEDURE — 3288F FALL RISK ASSESSMENT DOCD: CPT | Mod: CPTII,S$GLB,, | Performed by: STUDENT IN AN ORGANIZED HEALTH CARE EDUCATION/TRAINING PROGRAM

## 2024-06-18 PROCEDURE — 1101F PT FALLS ASSESS-DOCD LE1/YR: CPT | Mod: CPTII,S$GLB,, | Performed by: STUDENT IN AN ORGANIZED HEALTH CARE EDUCATION/TRAINING PROGRAM

## 2024-06-18 RX ORDER — HYDROXYZINE HYDROCHLORIDE 25 MG/1
25 TABLET, FILM COATED ORAL 3 TIMES DAILY PRN
Qty: 90 TABLET | Refills: 1 | Status: SHIPPED | OUTPATIENT
Start: 2024-06-18

## 2024-06-18 RX ORDER — LOSARTAN POTASSIUM AND HYDROCHLOROTHIAZIDE 12.5; 5 MG/1; MG/1
1 TABLET ORAL DAILY
Qty: 90 TABLET | Refills: 3 | Status: SHIPPED | OUTPATIENT
Start: 2024-06-18 | End: 2025-06-18

## 2024-06-18 RX ORDER — AMLODIPINE BESYLATE 10 MG/1
10 TABLET ORAL DAILY
Qty: 90 TABLET | Refills: 3 | Status: SHIPPED | OUTPATIENT
Start: 2024-06-18 | End: 2025-06-18

## 2024-06-18 NOTE — PROGRESS NOTES
[facility] Note    Subjective:    The HPI and pertinent ROS is included in the Diagnostic Impression Remarks section at the end of the note. Please see below for further details. Chief complaint is at end of note.     Mahsa is a pleasant intelligent patient who is here for evaluation.     Modified Medications    Modified Medication Previous Medication    HYDROXYZINE HCL (ATARAX) 25 MG TABLET hydrOXYzine HCL (ATARAX) 25 MG tablet       Take 1 tablet (25 mg total) by mouth 3 (three) times daily as needed for Anxiety.    Take 1 tablet (25 mg total) by mouth 3 (three) times daily as needed for Anxiety.    LOSARTAN-HYDROCHLOROTHIAZIDE 50-12.5 MG (HYZAAR) 50-12.5 MG PER TABLET losartan-hydrochlorothiazide 50-12.5 mg (HYZAAR) 50-12.5 mg per tablet       Take 1 tablet by mouth once daily.    Take 1 tablet by mouth once daily.       Data reviewed 274}  Previous medical records reviewed and summarized in plan section at end of note.      If you are due for any health screening(s) below please notify me so we can arrange them to be ordered and scheduled. Most healthy patients at your age complete them, but you are free to accept or refuse. If you can't do it, I'll definitely understand. If you can, I'd certainly appreciate it!     All of your core healthy metrics are met.      The following portions of the patient's history were reviewed and updated as appropriate: allergies, current medications, past family history, past medical history, past social history, past surgical history and problem list.    She  has a past medical history of Arthritis, Cataracts, bilateral, Osteopenia, and Thyroid disease.  She  has a past surgical history that includes Eye surgery; lamenectomy; Total replacement of both hip joints using computer-assisted navigation; Gallbladder surgery; Dilation and curettage of uterus; Hysteroscopy with dilation and curettage of uterus (N/A, 5/27/2019); Hysteroscopic surgical procedure (N/A,  "5/27/2019); Cholecystectomy; Epidural steroid injection (N/A, 12/23/2020); Joint replacement (Bilateral); Robot-assisted subtotal hysterectomy (N/A, 11/9/2021); Robot-assisted laparoscopic abdominal sacrocolpopexy using da Lizet Xi (N/A, 11/9/2021); Robot-assisted laparoscopic salpingo-oophorectomy using da Lizet Xi (Bilateral, 11/9/2021); and Hysterectomy.    She  reports that she has never smoked. She has been exposed to tobacco smoke. She has never used smokeless tobacco. She reports that she does not currently use alcohol. She reports that she does not use drugs.  She family history is not on file.    Review of patient's allergies indicates:   Allergen Reactions    Adhesive Rash    Morphine     Oxymetazoline hcl Rash    Sulfa (sulfonamide antibiotics) Nausea And Vomiting    Xylometazoline hcl        Tobacco Use: Low Risk  (6/18/2024)    Patient History     Smoking Tobacco Use: Never     Smokeless Tobacco Use: Never     Passive Exposure: Past     Physical Examination  General appearance: alert, cooperative, no distress  Neck: no thyromegaly, no neck stiffness  Lungs: clear to auscultation, no wheezes, rales or rhonchi, symmetric air entry  Heart: normal rate, regular rhythm, normal S1, S2, no murmurs, rubs, clicks or gallops  Abdomen: soft, nontender, nondistended, no rigidity, rebound, or guarding.   Back: no point tenderness over spine  Extremities: peripheral pulses normal, no unilateral leg swelling or calf tenderness   Neurological:alert, oriented, normal speech, no new focal findings or movement disorder noted from baseline    BP Readings from Last 3 Encounters:   06/12/24 (!) 162/71   05/29/24 (!) 140/88   05/26/24 132/72     Wt Readings from Last 3 Encounters:   06/12/24 78 kg (172 lb)   05/29/24 78 kg (172 lb)   05/26/24 78.9 kg (174 lb)     Ht 5' 4" (1.626 m)   BMI 29.52 kg/m²    274}  Laboratory: I have reviewed old labs below:    274}    Lab Results   Component Value Date    WBC 8.68 06/12/2024    " "HGB 11.7 (L) 06/12/2024    HCT 34.0 (L) 06/12/2024    MCV 83 06/12/2024     06/12/2024     (L) 06/12/2024    K 4.1 06/12/2024    CL 95 06/12/2024    CALCIUM 9.0 06/12/2024    CO2 22 (L) 06/12/2024     06/12/2024    BUN 18 06/12/2024    CREATININE 0.8 06/12/2024    EGFRNORACEVR >60.0 06/12/2024    ANIONGAP 10 06/12/2024    PROT 6.8 06/12/2024    ALBUMIN 3.3 (L) 06/12/2024    BILITOT 0.6 06/12/2024    ALKPHOS 74 06/12/2024    ALT 14 06/12/2024    AST 25 06/12/2024    INR 1.0 03/15/2020    CHOL 237 (H) 12/29/2022    TRIG 74 12/29/2022    HDL 74 12/29/2022    LDLCALC 148.2 12/29/2022    TSH 2.183 03/19/2024    HGBA1C 5.7 03/26/2021      Lab reviewed by me: Particular labs of significance that I will monitor, workup, or treat to improve are mentioned below in diagnostic impression remarks.    Imaging/EKG: I have reviewed the pertinent results and my findings are noted in remarks.  274}    CC:   Chief Complaint   Patient presents with    Urinary Tract Infection        274}    Assessment/Plan  Mahsa Pedroza is a 89 y.o. female who presents to clinic with:  1. Dysuria    2. Essential hypertension    3. Anxiety    4. Fall, initial encounter       274}  Diagnostic Impression Remarks + HPI     Documentation entered by me for this encounter may have been done in part using speech-recognition technology. Although I have made an effort to ensure accuracy, "sound like" errors may exist and should be interpreted in context.      Patient had a fall last week that resulted in ED visit with imaging negative for any acute changes  Patient attributed the fall from mechanical fall and rushing and losing her footing  Patient also on atarax but denies any falls in the past from taking the atarax. Will closely monitor  HTN: patient would like to restart amlodipine. W/ bp remaining uncontrolled  Patient was placed on estrodiol by OB/GYN d/t dryness and UTI. Found to have UTI with fall to the ED    Additional workup " planned: see labs ordered below.    See below for labs and meds ordered with associated diagnosis      1. Dysuria  - Urinalysis, Reflex to Urine Culture Urine, Clean Catch    2. Essential hypertension  - losartan-hydrochlorothiazide 50-12.5 mg (HYZAAR) 50-12.5 mg per tablet; Take 1 tablet by mouth once daily.  Dispense: 90 tablet; Refill: 3  - amLODIPine (NORVASC) 10 MG tablet; Take 1 tablet (10 mg total) by mouth once daily.  Dispense: 90 tablet; Refill: 3    3. Anxiety  - hydrOXYzine HCL (ATARAX) 25 MG tablet; Take 1 tablet (25 mg total) by mouth 3 (three) times daily as needed for Anxiety.  Dispense: 90 tablet; Refill: 1    4. Fall, initial encounter  - Ambulatory referral/consult to Home Health; Future      Javier Mckeon MD   274}    If you are due for any health screening(s) below please notify me so we can arrange them to be ordered and scheduled. Most healthy patients at your age complete them, but you are free to accept or refuse.     If you can't do it, I'll definitely understand. If you can, I'd certainly appreciate it!   All of your core healthy metrics are met.

## 2024-06-21 ENCOUNTER — PATIENT MESSAGE (OUTPATIENT)
Dept: FAMILY MEDICINE | Facility: CLINIC | Age: 89
End: 2024-06-21
Payer: COMMERCIAL

## 2024-06-21 DIAGNOSIS — N39.0 FREQUENT UTI: Primary | ICD-10-CM

## 2024-06-25 RX ORDER — CIPROFLOXACIN 500 MG/1
500 TABLET ORAL EVERY 12 HOURS
Qty: 10 TABLET | Refills: 0 | Status: SHIPPED | OUTPATIENT
Start: 2024-06-25

## 2024-06-25 NOTE — TELEPHONE ENCOUNTER
See mychart message. Pt's urine did not reflex to culture as it did not meet criteria. Pt is still experiencing symptoms. Please advise.

## 2024-07-08 ENCOUNTER — PATIENT MESSAGE (OUTPATIENT)
Dept: FAMILY MEDICINE | Facility: CLINIC | Age: 89
End: 2024-07-08
Payer: COMMERCIAL

## 2024-07-08 ENCOUNTER — TELEPHONE (OUTPATIENT)
Dept: FAMILY MEDICINE | Facility: CLINIC | Age: 89
End: 2024-07-08
Payer: COMMERCIAL

## 2024-07-08 NOTE — TELEPHONE ENCOUNTER
----- Message from Noemi Thayer sent at 7/8/2024  2:18 PM CDT -----  Type:  Needs Medical Advice    Who Called: pt    Symptoms (please be specific): na     How long has patient had these symptoms:  na    Pharmacy name and phone #:    [id number] - [place] [address] [address]  [address] [address]  Phone: [phone] Fax: [phone]    Would the patient rather a call back or a response via [facility]? Call back    Best Call Back Number: [phone]      Additional Information: pt is asking for a call from the office regarding her blood pressure medication      Please call Back to advise. Thanks!

## 2024-07-08 NOTE — TELEPHONE ENCOUNTER
----- Message from Lisa Reese sent at 7/8/2024  3:36 PM CDT -----  Contact: PT  Type: Needs Medical Advice    Who Called: PT  Best Call Back Number: [phone]  Additional  Information: PT she looked at her my chart wrong, please disregard previous message sent regarding blood pressure medication.  Please Advise- Thank you

## 2024-07-08 NOTE — TELEPHONE ENCOUNTER
"Returned call to patient in regards to medication.   Pt states, " my office note shows he changed my losartan to 25 mg but the 50 mg was sent in"   Reviewed office note, losartan- hydrochlorothiazide 50-12.5 mg sent into pharmacy.    Please advise if pt should be taking RX sent in     "

## 2024-07-24 ENCOUNTER — EXTERNAL HOME HEALTH (OUTPATIENT)
Dept: HOME HEALTH SERVICES | Facility: HOSPITAL | Age: 89
End: 2024-07-24
Payer: MEDICARE

## 2024-07-30 ENCOUNTER — TELEPHONE (OUTPATIENT)
Dept: FAMILY MEDICINE | Facility: CLINIC | Age: 89
End: 2024-07-30
Payer: MEDICARE

## 2024-07-30 DIAGNOSIS — Z00.00 ENCOUNTER FOR MEDICARE ANNUAL WELLNESS EXAM: ICD-10-CM

## 2024-07-30 NOTE — TELEPHONE ENCOUNTER
Spoke with Paulina with Home Health,  Paulina seen patient today, first blood pressure with automatic blood pressure cuff, 99/54, checked with manual Blood pressure 118/54. O2 titering 92-94%, able to rise to  96% with deep breathing, crackles right lower lobe.   Pt c/o SOB at times/ tiredness, unable to meet Paulina at front door at arrival  Advised Paulina for pt to report the ER for further evaluation. .   Paulina voiced understanding.

## 2024-07-30 NOTE — TELEPHONE ENCOUNTER
Spoke with Paulina with Home Health,  Pt current Blood pressure 118/54, O2  96, crackles right lower lobe.   Pt c/o SOB at times/ tiredness  Advised Paulina for pt to report the ER. .   Paulina voiced understanding.      no

## 2024-07-30 NOTE — TELEPHONE ENCOUNTER
----- Message from Katie Steph sent at 7/30/2024  3:11 PM CDT -----  Regarding: advice  Contact: Elizabeth with Home health  Type: Needs Medical Advice  Who Called:  Elizabeth with Home health  Symptoms (please be specific):    How long has patient had these symptoms:    Pharmacy name and phone #:    Best Call Back Number: [phone]  Additional Information: Elizabeth states patient was feeling short of breath and tired.  Her blood pressure was 118/54 O2 level 96.  Elizabeth states she could crackles in right lower lobe.  Please call Elizabeth to advise.  Thanks!

## 2024-08-02 ENCOUNTER — HOSPITAL ENCOUNTER (INPATIENT)
Facility: HOSPITAL | Age: 89
LOS: 1 days | Discharge: HOME | DRG: 641 | End: 2024-08-04
Attending: EMERGENCY MEDICINE | Admitting: INTERNAL MEDICINE
Payer: MEDICARE

## 2024-08-02 DIAGNOSIS — R42 DIZZINESS: ICD-10-CM

## 2024-08-02 DIAGNOSIS — R06.02 SHORTNESS OF BREATH: ICD-10-CM

## 2024-08-02 DIAGNOSIS — E87.1 HYPONATREMIA: Primary | ICD-10-CM

## 2024-08-02 DIAGNOSIS — H81.13 BENIGN PAROXYSMAL POSITIONAL VERTIGO DUE TO BILATERAL VESTIBULAR DISORDER: ICD-10-CM

## 2024-08-02 DIAGNOSIS — R07.9 CHEST PAIN: ICD-10-CM

## 2024-08-02 DIAGNOSIS — K59.00 CONSTIPATION: ICD-10-CM

## 2024-08-02 LAB
ALBUMIN SERPL BCP-MCNC: 3.9 G/DL (ref 3.5–5.2)
ALP SERPL-CCNC: 66 U/L (ref 55–135)
ALT SERPL W/O P-5'-P-CCNC: 17 U/L (ref 10–44)
ANION GAP SERPL CALC-SCNC: 8 MMOL/L (ref 8–16)
AST SERPL-CCNC: 24 U/L (ref 10–40)
BASOPHILS # BLD AUTO: 0.04 K/UL (ref 0–0.2)
BASOPHILS NFR BLD: 0.5 % (ref 0–1.9)
BILIRUB SERPL-MCNC: 0.8 MG/DL (ref 0.1–1)
BILIRUB UR QL STRIP: NEGATIVE
BNP SERPL-MCNC: 51 PG/ML (ref 0–99)
BUN SERPL-MCNC: 10 MG/DL (ref 8–23)
CALCIUM SERPL-MCNC: 8.9 MG/DL (ref 8.7–10.5)
CHLORIDE SERPL-SCNC: 91 MMOL/L (ref 95–110)
CLARITY UR: CLEAR
CO2 SERPL-SCNC: 23 MMOL/L (ref 23–29)
COLOR UR: YELLOW
CREAT SERPL-MCNC: 0.7 MG/DL (ref 0.5–1.4)
DIFFERENTIAL METHOD BLD: ABNORMAL
EOSINOPHIL # BLD AUTO: 0.1 K/UL (ref 0–0.5)
EOSINOPHIL NFR BLD: 1 % (ref 0–8)
ERYTHROCYTE [DISTWIDTH] IN BLOOD BY AUTOMATED COUNT: 12.9 % (ref 11.5–14.5)
EST. GFR  (NO RACE VARIABLE): >60 ML/MIN/1.73 M^2
GLUCOSE SERPL-MCNC: 99 MG/DL (ref 70–110)
GLUCOSE UR QL STRIP: NEGATIVE
HCT VFR BLD AUTO: 36 % (ref 37–48.5)
HGB BLD-MCNC: 12.2 G/DL (ref 12–16)
HGB UR QL STRIP: ABNORMAL
IMM GRANULOCYTES # BLD AUTO: 0.03 K/UL (ref 0–0.04)
IMM GRANULOCYTES NFR BLD AUTO: 0.4 % (ref 0–0.5)
KETONES UR QL STRIP: NEGATIVE
LACTATE SERPL-SCNC: 0.6 MMOL/L (ref 0.5–2.2)
LEUKOCYTE ESTERASE UR QL STRIP: NEGATIVE
LYMPHOCYTES # BLD AUTO: 1.9 K/UL (ref 1–4.8)
LYMPHOCYTES NFR BLD: 23.8 % (ref 18–48)
MAGNESIUM SERPL-MCNC: 1.8 MG/DL (ref 1.6–2.6)
MCH RBC QN AUTO: 28.2 PG (ref 27–31)
MCHC RBC AUTO-ENTMCNC: 33.9 G/DL (ref 32–36)
MCV RBC AUTO: 83 FL (ref 82–98)
MONOCYTES # BLD AUTO: 0.7 K/UL (ref 0.3–1)
MONOCYTES NFR BLD: 9.3 % (ref 4–15)
NEUTROPHILS # BLD AUTO: 5.2 K/UL (ref 1.8–7.7)
NEUTROPHILS NFR BLD: 65 % (ref 38–73)
NITRITE UR QL STRIP: NEGATIVE
NRBC BLD-RTO: 0 /100 WBC
PH UR STRIP: 7 [PH] (ref 5–8)
PLATELET # BLD AUTO: 251 K/UL (ref 150–450)
PMV BLD AUTO: 9.1 FL (ref 9.2–12.9)
POTASSIUM SERPL-SCNC: 4.2 MMOL/L (ref 3.5–5.1)
PROT SERPL-MCNC: 6.9 G/DL (ref 6–8.4)
PROT UR QL STRIP: NEGATIVE
RBC # BLD AUTO: 4.32 M/UL (ref 4–5.4)
SARS-COV-2 RDRP RESP QL NAA+PROBE: NEGATIVE
SODIUM SERPL-SCNC: 122 MMOL/L (ref 136–145)
SP GR UR STRIP: 1.01 (ref 1–1.03)
TROPONIN I SERPL DL<=0.01 NG/ML-MCNC: <0.006 NG/ML (ref 0–0.03)
TSH SERPL DL<=0.005 MIU/L-ACNC: 1.73 UIU/ML (ref 0.4–4)
URN SPEC COLLECT METH UR: ABNORMAL
UROBILINOGEN UR STRIP-ACNC: NEGATIVE EU/DL
WBC # BLD AUTO: 7.99 K/UL (ref 3.9–12.7)

## 2024-08-02 PROCEDURE — 83605 ASSAY OF LACTIC ACID: CPT | Performed by: EMERGENCY MEDICINE

## 2024-08-02 PROCEDURE — 83935 ASSAY OF URINE OSMOLALITY: CPT | Performed by: STUDENT IN AN ORGANIZED HEALTH CARE EDUCATION/TRAINING PROGRAM

## 2024-08-02 PROCEDURE — 36415 COLL VENOUS BLD VENIPUNCTURE: CPT | Performed by: EMERGENCY MEDICINE

## 2024-08-02 PROCEDURE — 83735 ASSAY OF MAGNESIUM: CPT | Performed by: EMERGENCY MEDICINE

## 2024-08-02 PROCEDURE — 71045 X-RAY EXAM CHEST 1 VIEW: CPT | Mod: TC

## 2024-08-02 PROCEDURE — G0378 HOSPITAL OBSERVATION PER HR: HCPCS

## 2024-08-02 PROCEDURE — 83880 ASSAY OF NATRIURETIC PEPTIDE: CPT | Performed by: EMERGENCY MEDICINE

## 2024-08-02 PROCEDURE — 85025 COMPLETE CBC W/AUTO DIFF WBC: CPT | Performed by: EMERGENCY MEDICINE

## 2024-08-02 PROCEDURE — 81003 URINALYSIS AUTO W/O SCOPE: CPT | Performed by: STUDENT IN AN ORGANIZED HEALTH CARE EDUCATION/TRAINING PROGRAM

## 2024-08-02 PROCEDURE — 74018 RADEX ABDOMEN 1 VIEW: CPT | Mod: TC

## 2024-08-02 PROCEDURE — 99285 EMERGENCY DEPT VISIT HI MDM: CPT | Mod: 25

## 2024-08-02 PROCEDURE — 93005 ELECTROCARDIOGRAM TRACING: CPT

## 2024-08-02 PROCEDURE — 84300 ASSAY OF URINE SODIUM: CPT | Performed by: STUDENT IN AN ORGANIZED HEALTH CARE EDUCATION/TRAINING PROGRAM

## 2024-08-02 PROCEDURE — 84443 ASSAY THYROID STIM HORMONE: CPT | Performed by: EMERGENCY MEDICINE

## 2024-08-02 PROCEDURE — U0002 COVID-19 LAB TEST NON-CDC: HCPCS | Performed by: EMERGENCY MEDICINE

## 2024-08-02 PROCEDURE — 71045 X-RAY EXAM CHEST 1 VIEW: CPT | Mod: 26,,, | Performed by: RADIOLOGY

## 2024-08-02 PROCEDURE — 99223 1ST HOSP IP/OBS HIGH 75: CPT | Mod: AI,,, | Performed by: STUDENT IN AN ORGANIZED HEALTH CARE EDUCATION/TRAINING PROGRAM

## 2024-08-02 PROCEDURE — 80053 COMPREHEN METABOLIC PANEL: CPT | Performed by: EMERGENCY MEDICINE

## 2024-08-02 PROCEDURE — 93010 ELECTROCARDIOGRAM REPORT: CPT | Mod: ,,, | Performed by: INTERNAL MEDICINE

## 2024-08-02 PROCEDURE — 74018 RADEX ABDOMEN 1 VIEW: CPT | Mod: 26,,, | Performed by: RADIOLOGY

## 2024-08-02 PROCEDURE — 84484 ASSAY OF TROPONIN QUANT: CPT | Performed by: EMERGENCY MEDICINE

## 2024-08-02 PROCEDURE — 25000003 PHARM REV CODE 250: Performed by: STUDENT IN AN ORGANIZED HEALTH CARE EDUCATION/TRAINING PROGRAM

## 2024-08-02 RX ORDER — ACETAMINOPHEN 325 MG/1
650 TABLET ORAL EVERY 4 HOURS PRN
Status: DISCONTINUED | OUTPATIENT
Start: 2024-08-02 | End: 2024-08-04 | Stop reason: HOSPADM

## 2024-08-02 RX ORDER — GLUCAGON 1 MG
1 KIT INJECTION
Status: DISCONTINUED | OUTPATIENT
Start: 2024-08-02 | End: 2024-08-04 | Stop reason: HOSPADM

## 2024-08-02 RX ORDER — IBUPROFEN 200 MG
16 TABLET ORAL
Status: DISCONTINUED | OUTPATIENT
Start: 2024-08-02 | End: 2024-08-04 | Stop reason: HOSPADM

## 2024-08-02 RX ORDER — HYDRALAZINE HYDROCHLORIDE 20 MG/ML
5 INJECTION INTRAMUSCULAR; INTRAVENOUS
Status: DISCONTINUED | OUTPATIENT
Start: 2024-08-02 | End: 2024-08-02

## 2024-08-02 RX ORDER — ENOXAPARIN SODIUM 100 MG/ML
40 INJECTION SUBCUTANEOUS EVERY 24 HOURS
Status: DISCONTINUED | OUTPATIENT
Start: 2024-08-03 | End: 2024-08-04 | Stop reason: HOSPADM

## 2024-08-02 RX ORDER — AMLODIPINE BESYLATE 5 MG/1
10 TABLET ORAL DAILY
Status: DISCONTINUED | OUTPATIENT
Start: 2024-08-03 | End: 2024-08-04 | Stop reason: HOSPADM

## 2024-08-02 RX ORDER — BUPROPION HYDROCHLORIDE 150 MG/1
150 TABLET ORAL DAILY
Status: DISCONTINUED | OUTPATIENT
Start: 2024-08-03 | End: 2024-08-04 | Stop reason: HOSPADM

## 2024-08-02 RX ORDER — SODIUM CHLORIDE 0.9 % (FLUSH) 0.9 %
10 SYRINGE (ML) INJECTION EVERY 12 HOURS PRN
Status: DISCONTINUED | OUTPATIENT
Start: 2024-08-02 | End: 2024-08-04 | Stop reason: HOSPADM

## 2024-08-02 RX ORDER — POLYETHYLENE GLYCOL 3350 17 G/17G
17 POWDER, FOR SOLUTION ORAL 2 TIMES DAILY
Status: DISCONTINUED | OUTPATIENT
Start: 2024-08-02 | End: 2024-08-04 | Stop reason: HOSPADM

## 2024-08-02 RX ORDER — IBUPROFEN 200 MG
24 TABLET ORAL
Status: DISCONTINUED | OUTPATIENT
Start: 2024-08-02 | End: 2024-08-04 | Stop reason: HOSPADM

## 2024-08-02 RX ORDER — ALUMINUM HYDROXIDE, MAGNESIUM HYDROXIDE, AND SIMETHICONE 1200; 120; 1200 MG/30ML; MG/30ML; MG/30ML
30 SUSPENSION ORAL 4 TIMES DAILY PRN
Status: DISCONTINUED | OUTPATIENT
Start: 2024-08-02 | End: 2024-08-04 | Stop reason: HOSPADM

## 2024-08-02 RX ORDER — LOSARTAN POTASSIUM 25 MG/1
50 TABLET ORAL DAILY
Status: DISCONTINUED | OUTPATIENT
Start: 2024-08-03 | End: 2024-08-04 | Stop reason: HOSPADM

## 2024-08-02 RX ORDER — CARVEDILOL 3.12 MG/1
3.12 TABLET ORAL 2 TIMES DAILY
Status: DISCONTINUED | OUTPATIENT
Start: 2024-08-02 | End: 2024-08-04 | Stop reason: HOSPADM

## 2024-08-02 RX ORDER — ONDANSETRON HYDROCHLORIDE 2 MG/ML
4 INJECTION, SOLUTION INTRAVENOUS EVERY 8 HOURS PRN
Status: DISCONTINUED | OUTPATIENT
Start: 2024-08-02 | End: 2024-08-04 | Stop reason: HOSPADM

## 2024-08-02 RX ORDER — SODIUM CHLORIDE 9 MG/ML
INJECTION, SOLUTION INTRAVENOUS CONTINUOUS
Status: DISCONTINUED | OUTPATIENT
Start: 2024-08-02 | End: 2024-08-04 | Stop reason: HOSPADM

## 2024-08-02 RX ORDER — HYDROXYZINE HYDROCHLORIDE 25 MG/1
25 TABLET, FILM COATED ORAL 3 TIMES DAILY PRN
Status: DISCONTINUED | OUTPATIENT
Start: 2024-08-02 | End: 2024-08-04 | Stop reason: HOSPADM

## 2024-08-02 RX ORDER — LEVOTHYROXINE SODIUM 25 UG/1
50 TABLET ORAL
Status: DISCONTINUED | OUTPATIENT
Start: 2024-08-03 | End: 2024-08-04 | Stop reason: HOSPADM

## 2024-08-02 RX ORDER — NALOXONE HCL 0.4 MG/ML
0.02 VIAL (ML) INJECTION
Status: DISCONTINUED | OUTPATIENT
Start: 2024-08-02 | End: 2024-08-04 | Stop reason: HOSPADM

## 2024-08-02 RX ADMIN — SODIUM CHLORIDE: 9 INJECTION, SOLUTION INTRAVENOUS at 08:08

## 2024-08-02 RX ADMIN — POLYETHYLENE GLYCOL (3350) 17 G: 17 POWDER, FOR SOLUTION ORAL at 08:08

## 2024-08-02 RX ADMIN — CARVEDILOL 3.12 MG: 3.12 TABLET, FILM COATED ORAL at 08:08

## 2024-08-03 LAB
ALBUMIN SERPL BCP-MCNC: 3.6 G/DL (ref 3.5–5.2)
ALP SERPL-CCNC: 62 U/L (ref 55–135)
ALT SERPL W/O P-5'-P-CCNC: 17 U/L (ref 10–44)
ANION GAP SERPL CALC-SCNC: 8 MMOL/L (ref 8–16)
ANION GAP SERPL CALC-SCNC: 8 MMOL/L (ref 8–16)
AST SERPL-CCNC: 20 U/L (ref 10–40)
BASOPHILS # BLD AUTO: 0.04 K/UL (ref 0–0.2)
BASOPHILS NFR BLD: 0.6 % (ref 0–1.9)
BILIRUB SERPL-MCNC: 0.7 MG/DL (ref 0.1–1)
BUN SERPL-MCNC: 12 MG/DL (ref 8–23)
BUN SERPL-MCNC: 16 MG/DL (ref 8–23)
CALCIUM SERPL-MCNC: 8.7 MG/DL (ref 8.7–10.5)
CALCIUM SERPL-MCNC: 8.8 MG/DL (ref 8.7–10.5)
CHLORIDE SERPL-SCNC: 102 MMOL/L (ref 95–110)
CHLORIDE SERPL-SCNC: 99 MMOL/L (ref 95–110)
CO2 SERPL-SCNC: 24 MMOL/L (ref 23–29)
CO2 SERPL-SCNC: 25 MMOL/L (ref 23–29)
CREAT SERPL-MCNC: 0.8 MG/DL (ref 0.5–1.4)
CREAT SERPL-MCNC: 0.9 MG/DL (ref 0.5–1.4)
DIFFERENTIAL METHOD BLD: ABNORMAL
EOSINOPHIL # BLD AUTO: 0.1 K/UL (ref 0–0.5)
EOSINOPHIL NFR BLD: 1.8 % (ref 0–8)
ERYTHROCYTE [DISTWIDTH] IN BLOOD BY AUTOMATED COUNT: 13.2 % (ref 11.5–14.5)
EST. GFR  (NO RACE VARIABLE): >60 ML/MIN/1.73 M^2
EST. GFR  (NO RACE VARIABLE): >60 ML/MIN/1.73 M^2
GLUCOSE SERPL-MCNC: 91 MG/DL (ref 70–110)
GLUCOSE SERPL-MCNC: 98 MG/DL (ref 70–110)
HCT VFR BLD AUTO: 35.9 % (ref 37–48.5)
HGB BLD-MCNC: 12.1 G/DL (ref 12–16)
IMM GRANULOCYTES # BLD AUTO: 0.04 K/UL (ref 0–0.04)
IMM GRANULOCYTES NFR BLD AUTO: 0.6 % (ref 0–0.5)
LYMPHOCYTES # BLD AUTO: 2.4 K/UL (ref 1–4.8)
LYMPHOCYTES NFR BLD: 35.6 % (ref 18–48)
MAGNESIUM SERPL-MCNC: 2 MG/DL (ref 1.6–2.6)
MCH RBC QN AUTO: 28.2 PG (ref 27–31)
MCHC RBC AUTO-ENTMCNC: 33.7 G/DL (ref 32–36)
MCV RBC AUTO: 84 FL (ref 82–98)
MONOCYTES # BLD AUTO: 0.7 K/UL (ref 0.3–1)
MONOCYTES NFR BLD: 10.1 % (ref 4–15)
NEUTROPHILS # BLD AUTO: 3.4 K/UL (ref 1.8–7.7)
NEUTROPHILS NFR BLD: 51.3 % (ref 38–73)
NRBC BLD-RTO: 0 /100 WBC
OSMOLALITY SERPL: 281 MOSM/KG (ref 275–295)
OSMOLALITY UR: 116 MOSM/KG (ref 50–1200)
PHOSPHATE SERPL-MCNC: 4 MG/DL (ref 2.7–4.5)
PLATELET # BLD AUTO: 252 K/UL (ref 150–450)
PMV BLD AUTO: 8.9 FL (ref 9.2–12.9)
POTASSIUM SERPL-SCNC: 4 MMOL/L (ref 3.5–5.1)
POTASSIUM SERPL-SCNC: 4.4 MMOL/L (ref 3.5–5.1)
PROT SERPL-MCNC: 6.6 G/DL (ref 6–8.4)
RBC # BLD AUTO: 4.29 M/UL (ref 4–5.4)
SODIUM SERPL-SCNC: 132 MMOL/L (ref 136–145)
SODIUM SERPL-SCNC: 134 MMOL/L (ref 136–145)
SODIUM UR-SCNC: 39 MMOL/L (ref 20–250)
WBC # BLD AUTO: 6.66 K/UL (ref 3.9–12.7)

## 2024-08-03 PROCEDURE — 63600175 PHARM REV CODE 636 W HCPCS: Performed by: STUDENT IN AN ORGANIZED HEALTH CARE EDUCATION/TRAINING PROGRAM

## 2024-08-03 PROCEDURE — 94761 N-INVAS EAR/PLS OXIMETRY MLT: CPT

## 2024-08-03 PROCEDURE — 25000003 PHARM REV CODE 250: Performed by: STUDENT IN AN ORGANIZED HEALTH CARE EDUCATION/TRAINING PROGRAM

## 2024-08-03 PROCEDURE — 83930 ASSAY OF BLOOD OSMOLALITY: CPT | Performed by: EMERGENCY MEDICINE

## 2024-08-03 PROCEDURE — 99233 SBSQ HOSP IP/OBS HIGH 50: CPT | Mod: ,,, | Performed by: INTERNAL MEDICINE

## 2024-08-03 PROCEDURE — 80048 BASIC METABOLIC PNL TOTAL CA: CPT | Mod: XB | Performed by: STUDENT IN AN ORGANIZED HEALTH CARE EDUCATION/TRAINING PROGRAM

## 2024-08-03 PROCEDURE — 85025 COMPLETE CBC W/AUTO DIFF WBC: CPT | Performed by: STUDENT IN AN ORGANIZED HEALTH CARE EDUCATION/TRAINING PROGRAM

## 2024-08-03 PROCEDURE — 84100 ASSAY OF PHOSPHORUS: CPT | Performed by: STUDENT IN AN ORGANIZED HEALTH CARE EDUCATION/TRAINING PROGRAM

## 2024-08-03 PROCEDURE — 83735 ASSAY OF MAGNESIUM: CPT | Performed by: STUDENT IN AN ORGANIZED HEALTH CARE EDUCATION/TRAINING PROGRAM

## 2024-08-03 PROCEDURE — 80053 COMPREHEN METABOLIC PANEL: CPT | Performed by: STUDENT IN AN ORGANIZED HEALTH CARE EDUCATION/TRAINING PROGRAM

## 2024-08-03 PROCEDURE — 11000001 HC ACUTE MED/SURG PRIVATE ROOM

## 2024-08-03 RX ADMIN — AMLODIPINE BESYLATE 10 MG: 5 TABLET ORAL at 08:08

## 2024-08-03 RX ADMIN — POLYETHYLENE GLYCOL (3350) 17 G: 17 POWDER, FOR SOLUTION ORAL at 08:08

## 2024-08-03 RX ADMIN — SODIUM CHLORIDE: 9 INJECTION, SOLUTION INTRAVENOUS at 11:08

## 2024-08-03 RX ADMIN — CARVEDILOL 3.12 MG: 3.12 TABLET, FILM COATED ORAL at 08:08

## 2024-08-03 RX ADMIN — HYDROXYZINE HYDROCHLORIDE 25 MG: 25 TABLET, FILM COATED ORAL at 04:08

## 2024-08-03 RX ADMIN — LEVOTHYROXINE SODIUM 50 MCG: 25 TABLET ORAL at 05:08

## 2024-08-03 RX ADMIN — BUPROPION HYDROCHLORIDE 150 MG: 150 TABLET, EXTENDED RELEASE ORAL at 11:08

## 2024-08-03 RX ADMIN — ENOXAPARIN SODIUM 40 MG: 40 INJECTION SUBCUTANEOUS at 04:08

## 2024-08-03 RX ADMIN — LOSARTAN POTASSIUM 50 MG: 25 TABLET, FILM COATED ORAL at 08:08

## 2024-08-03 RX ADMIN — ACETAMINOPHEN 650 MG: 325 TABLET ORAL at 04:08

## 2024-08-04 VITALS
OXYGEN SATURATION: 96 % | SYSTOLIC BLOOD PRESSURE: 123 MMHG | HEART RATE: 66 BPM | WEIGHT: 172.19 LBS | HEIGHT: 65 IN | TEMPERATURE: 98 F | DIASTOLIC BLOOD PRESSURE: 58 MMHG | BODY MASS INDEX: 28.69 KG/M2 | RESPIRATION RATE: 17 BRPM

## 2024-08-04 PROBLEM — H81.13 BENIGN PAROXYSMAL POSITIONAL VERTIGO DUE TO BILATERAL VESTIBULAR DISORDER: Status: ACTIVE | Noted: 2024-08-04

## 2024-08-04 LAB
ALBUMIN SERPL BCP-MCNC: 3.3 G/DL (ref 3.5–5.2)
ALP SERPL-CCNC: 54 U/L (ref 55–135)
ALT SERPL W/O P-5'-P-CCNC: 12 U/L (ref 10–44)
ANION GAP SERPL CALC-SCNC: 10 MMOL/L (ref 8–16)
ANION GAP SERPL CALC-SCNC: 8 MMOL/L (ref 8–16)
AST SERPL-CCNC: 16 U/L (ref 10–40)
BASOPHILS # BLD AUTO: 0.02 K/UL (ref 0–0.2)
BASOPHILS NFR BLD: 0.4 % (ref 0–1.9)
BILIRUB SERPL-MCNC: 0.5 MG/DL (ref 0.1–1)
BUN SERPL-MCNC: 10 MG/DL (ref 8–23)
BUN SERPL-MCNC: 13 MG/DL (ref 8–23)
CALCIUM SERPL-MCNC: 8.7 MG/DL (ref 8.7–10.5)
CALCIUM SERPL-MCNC: 8.8 MG/DL (ref 8.7–10.5)
CHLORIDE SERPL-SCNC: 103 MMOL/L (ref 95–110)
CHLORIDE SERPL-SCNC: 104 MMOL/L (ref 95–110)
CO2 SERPL-SCNC: 23 MMOL/L (ref 23–29)
CO2 SERPL-SCNC: 23 MMOL/L (ref 23–29)
CREAT SERPL-MCNC: 0.7 MG/DL (ref 0.5–1.4)
CREAT SERPL-MCNC: 0.7 MG/DL (ref 0.5–1.4)
DIFFERENTIAL METHOD BLD: ABNORMAL
EOSINOPHIL # BLD AUTO: 0.1 K/UL (ref 0–0.5)
EOSINOPHIL NFR BLD: 2.1 % (ref 0–8)
ERYTHROCYTE [DISTWIDTH] IN BLOOD BY AUTOMATED COUNT: 13.5 % (ref 11.5–14.5)
EST. GFR  (NO RACE VARIABLE): >60 ML/MIN/1.73 M^2
EST. GFR  (NO RACE VARIABLE): >60 ML/MIN/1.73 M^2
GLUCOSE SERPL-MCNC: 82 MG/DL (ref 70–110)
GLUCOSE SERPL-MCNC: 86 MG/DL (ref 70–110)
HCT VFR BLD AUTO: 33 % (ref 37–48.5)
HGB BLD-MCNC: 11.1 G/DL (ref 12–16)
IMM GRANULOCYTES # BLD AUTO: 0.02 K/UL (ref 0–0.04)
IMM GRANULOCYTES NFR BLD AUTO: 0.4 % (ref 0–0.5)
LYMPHOCYTES # BLD AUTO: 2.3 K/UL (ref 1–4.8)
LYMPHOCYTES NFR BLD: 43.6 % (ref 18–48)
MAGNESIUM SERPL-MCNC: 1.9 MG/DL (ref 1.6–2.6)
MCH RBC QN AUTO: 28.5 PG (ref 27–31)
MCHC RBC AUTO-ENTMCNC: 33.6 G/DL (ref 32–36)
MCV RBC AUTO: 85 FL (ref 82–98)
MONOCYTES # BLD AUTO: 0.6 K/UL (ref 0.3–1)
MONOCYTES NFR BLD: 11.6 % (ref 4–15)
NEUTROPHILS # BLD AUTO: 2.2 K/UL (ref 1.8–7.7)
NEUTROPHILS NFR BLD: 41.9 % (ref 38–73)
NRBC BLD-RTO: 0 /100 WBC
PHOSPHATE SERPL-MCNC: 3.3 MG/DL (ref 2.7–4.5)
PLATELET # BLD AUTO: 216 K/UL (ref 150–450)
PMV BLD AUTO: 9 FL (ref 9.2–12.9)
POTASSIUM SERPL-SCNC: 4 MMOL/L (ref 3.5–5.1)
POTASSIUM SERPL-SCNC: 4.2 MMOL/L (ref 3.5–5.1)
PROT SERPL-MCNC: 6.1 G/DL (ref 6–8.4)
RBC # BLD AUTO: 3.9 M/UL (ref 4–5.4)
SODIUM SERPL-SCNC: 135 MMOL/L (ref 136–145)
SODIUM SERPL-SCNC: 136 MMOL/L (ref 136–145)
WBC # BLD AUTO: 5.28 K/UL (ref 3.9–12.7)

## 2024-08-04 PROCEDURE — 85025 COMPLETE CBC W/AUTO DIFF WBC: CPT | Performed by: STUDENT IN AN ORGANIZED HEALTH CARE EDUCATION/TRAINING PROGRAM

## 2024-08-04 PROCEDURE — 80048 BASIC METABOLIC PNL TOTAL CA: CPT | Mod: XB | Performed by: STUDENT IN AN ORGANIZED HEALTH CARE EDUCATION/TRAINING PROGRAM

## 2024-08-04 PROCEDURE — 94761 N-INVAS EAR/PLS OXIMETRY MLT: CPT

## 2024-08-04 PROCEDURE — 36415 COLL VENOUS BLD VENIPUNCTURE: CPT | Performed by: STUDENT IN AN ORGANIZED HEALTH CARE EDUCATION/TRAINING PROGRAM

## 2024-08-04 PROCEDURE — 99239 HOSP IP/OBS DSCHRG MGMT >30: CPT | Mod: ,,, | Performed by: INTERNAL MEDICINE

## 2024-08-04 PROCEDURE — 25000003 PHARM REV CODE 250: Performed by: STUDENT IN AN ORGANIZED HEALTH CARE EDUCATION/TRAINING PROGRAM

## 2024-08-04 PROCEDURE — 83735 ASSAY OF MAGNESIUM: CPT | Performed by: STUDENT IN AN ORGANIZED HEALTH CARE EDUCATION/TRAINING PROGRAM

## 2024-08-04 PROCEDURE — 80053 COMPREHEN METABOLIC PANEL: CPT | Performed by: STUDENT IN AN ORGANIZED HEALTH CARE EDUCATION/TRAINING PROGRAM

## 2024-08-04 PROCEDURE — 84100 ASSAY OF PHOSPHORUS: CPT | Performed by: STUDENT IN AN ORGANIZED HEALTH CARE EDUCATION/TRAINING PROGRAM

## 2024-08-04 RX ORDER — LOSARTAN POTASSIUM 50 MG/1
50 TABLET ORAL DAILY
Qty: 90 TABLET | Refills: 3 | Status: SHIPPED | OUTPATIENT
Start: 2024-08-05 | End: 2025-08-05

## 2024-08-04 RX ORDER — POLYETHYLENE GLYCOL 3350 17 G/17G
17 POWDER, FOR SOLUTION ORAL DAILY
Qty: 30 EACH | Refills: 0 | Status: SHIPPED | OUTPATIENT
Start: 2024-08-04 | End: 2024-09-03

## 2024-08-04 RX ORDER — CARVEDILOL 3.12 MG/1
3.12 TABLET ORAL 2 TIMES DAILY
Qty: 180 TABLET | Refills: 3 | Status: SHIPPED | OUTPATIENT
Start: 2024-08-04 | End: 2025-08-04

## 2024-08-04 RX ORDER — MECLIZINE HYDROCHLORIDE 25 MG/1
25 TABLET ORAL 3 TIMES DAILY PRN
Qty: 90 TABLET | Refills: 2 | Status: SHIPPED | OUTPATIENT
Start: 2024-08-04

## 2024-08-04 RX ADMIN — SODIUM CHLORIDE: 9 INJECTION, SOLUTION INTRAVENOUS at 11:08

## 2024-08-04 RX ADMIN — BUPROPION HYDROCHLORIDE 150 MG: 150 TABLET, EXTENDED RELEASE ORAL at 08:08

## 2024-08-04 RX ADMIN — LEVOTHYROXINE SODIUM 50 MCG: 25 TABLET ORAL at 05:08

## 2024-08-04 RX ADMIN — CARVEDILOL 3.12 MG: 3.12 TABLET, FILM COATED ORAL at 08:08

## 2024-08-04 RX ADMIN — AMLODIPINE BESYLATE 10 MG: 5 TABLET ORAL at 08:08

## 2024-08-04 RX ADMIN — LOSARTAN POTASSIUM 50 MG: 25 TABLET, FILM COATED ORAL at 08:08

## 2024-08-05 LAB
OHS QRS DURATION: 94 MS
OHS QTC CALCULATION: 459 MS

## 2024-08-06 ENCOUNTER — DOCUMENT SCAN (OUTPATIENT)
Dept: HOME HEALTH SERVICES | Facility: HOSPITAL | Age: 89
End: 2024-08-06
Payer: MEDICARE

## 2024-08-06 ENCOUNTER — PATIENT OUTREACH (OUTPATIENT)
Dept: ADMINISTRATIVE | Facility: CLINIC | Age: 89
End: 2024-08-06
Payer: MEDICARE

## 2024-08-12 ENCOUNTER — OUTPATIENT CASE MANAGEMENT (OUTPATIENT)
Dept: ADMINISTRATIVE | Facility: OTHER | Age: 89
End: 2024-08-12
Payer: MEDICARE

## 2024-08-12 NOTE — LETTER
Mahsa Pedroza  [address] [place] [address]      Dear Mahsa Pedroza,     I work with [facility] Outpatient Care Management Department. We received a referral to call you to discuss your medical history. These services are free of charge and are offered to [facility] patients who have recently been discharged from any of our facilities or who have medical conditions that may require the skill of a nurse to assist with management.     I am a Registered Nurse who specializes in connecting patients with available medical and financial resources as well as addressing any educational needs that may be indicated.    I attempted to reach you by telephone, but I was unsuccessful. Please call our department so that we can go over some questions with you, regarding your health.    The Outpatient Care Management Department can be reached at [phone], from 8:00AM to 4:30 PM, on Monday thru Friday.     Additionally, [facility] also has a program where a nurse is available 24/7 to answer questions or provide medical advice, their number is [phone].      Thanks,  Jodi Tanner RN [facility]   Outpatient Care Management  Phone #: [phone]

## 2024-08-12 NOTE — PROGRESS NOTES
8/12/2024  1st attempt to complete Initial Assessment  for Outpatient Care Management, left message.  Will send letter thru [facility] portal.

## 2024-08-13 ENCOUNTER — OUTPATIENT CASE MANAGEMENT (OUTPATIENT)
Dept: ADMINISTRATIVE | Facility: OTHER | Age: 89
End: 2024-08-13
Payer: MEDICARE

## 2024-08-13 NOTE — LETTER
August 13, 2024             Dear Mahsa Pedroza:    Welcome to [facility]s Complex Care Management Program.  It was a pleasure talking with you today.  My name [facility] Jodi [facility]anner RN [facility] I look forward to being your .  My goal is to help you function at the healthiest and highest level possible.  You can contact me directly at [phone].    As an [facility] patient, some of the services we may be able to provide include:     Development of an individualized care plan with a Registered Nurse   Connection with a   Connection with available resources and services    Coordinate communication among your care team members   Provide coaching and education   Help you understand your doctors treatment plan  Help you obtain information about your insurance coverage.     All services provided by [facility]s Complex Care Managers and other care team members are coordinated with and communicated to your primary care team.      As part of your enrollment, you will be receiving education materials and more information about these services in your [facility] account, by phone or through the mail.  If you do not wish to participate or receive information, please contact our office at [phone].      Sincerely,        Jodi Tanner RN CCM [facility]   Out-patient RN Complex Care Manager

## 2024-08-13 NOTE — PROGRESS NOTES
Outpatient Care Management  Initial Patient Assessment    Patient: Mahsa Pedroza  MRN: [id number]  Date of Service: 08/13/2024  Completed by: Jodi Tanner RN  Referral Date: 08/04/2024  Date of Eligibility: 8/5/2024  Program:   High Risk  Status: Ongoing  Effective Dates: 8/13/2024 - present  Responsible Staff: Jodi Tanner RN        Reason for Visit   Patient presents with    [facility] Enrollment Call    Nursing Assessment       Brief Summary:  Mahsa Pedroza was referred by Dr. Pavon for hyponatremia and vertigo . Patient qualifies for program based on high risk score 74.8%.   Active problem list, medical, surgical and social history reviewed. Active comorbidities include anxiety, vertigo, HTN and hyponatremia.. Areas of need identified by patient include anxiety, safety/falls and vertigo.   Admitted to the hospital on 08/02/24 for complaitns of dizziness, headache, shortness of breath and constipation. Discharged home 08/04/24 with home health.     Called and assessment done with Mahsa Pedroza. She is going to [employer], sewing to take up a dress this morning, plays games on the internet, plays [employer] and reads her [facility] portal. She fell in the bathtub 08/10/24 and called to have some one assist her. She lives with her son Rahul and MANUEL Weller. She uses a rollator. She had another fall because she states that she was moving too fast in the hallway.  No injuries from either fall.  She has [facility] nurse and PT. She is receiving meals on wheels. She reports no pain. She is not taking any pain medication. She is taking meclizine for dizziness. She is having constipation but is on a modified high fiber diet. Weight 167 pounds. Takes her BP daily.     Med Rec done   Next steps: Follow up in 1 week in or around 08/20/2024  Refer to  for advance directives/living will, [place] Aide and Attendance ( was in [place]).   Mailed Vertigo    Disability Status  Is the patient alert and oriented  (person, place, time, and situation)?: Alert and oriented x 4  Hearing Difficulty or Deaf: no  Visual Difficulty or Blind: no  Visual and Hearing Needs Conclusion: wears glasses  Difficulty Concentrating, Remembering or Making Decisions: no  Communication Difficulty: no  Eating/Swallowing Difficulty: no  Walking or Climbing Stairs Difficulty: yes  Walking or Climbing Stairs: ambulation difficulty, requires equipment; stair climbing difficulty, requires equipment; transferring difficulty, requires equipment  Mobility Management: uses a rollator  Dressing/Bathing Difficulty: no  Toileting : Independent  Continence : Continence - Not a problem  Difficulty Managing Errands Independently: yes  Errands Management: does not drive- MANUEL or son who she lives with run errands for her  Equipment Currently Used at Home: bath bench; blood pressure machine; grab bar; rollator; scale  ADL Conclusion Statement: if she needs assistance MANUEL will help her but still takes her own baths and dresses herself, sewing up a dress this morning  Change in Functional Status Since Onset of Current Illness/Injury: yes        Spiritual Beliefs  Spiritual, Cultural Beliefs, Baptism Practices, Values that Affect Care: no      Social History     Socioeconomic History    Marital status:    Occupational History    Occupation: retired   Tobacco Use    Smoking status: Never     Passive exposure: Past    Smokeless tobacco: Never   Substance and Sexual Activity    Alcohol use: Not Currently    Drug use: Never    Sexual activity: Not Currently     Birth control/protection: See Surgical Hx     Social Determinants of Health     Financial Resource Strain: Patient Declined (12/27/2023)    Overall Financial Resource Strain (CARDIA)     Difficulty of Paying Living Expenses: Patient declined   Food Insecurity: No Food Insecurity (12/27/2023)    Hunger Vital Sign     Worried About Running Out of Food in the Last Year: Never true     Ran Out of Food in the  Last Year: Never true   Transportation Needs: No Transportation Needs (12/27/2023)    PRAPARE - Transportation     Lack of Transportation (Medical): No     Lack of Transportation (Non-Medical): No   Physical Activity: Unknown (12/27/2023)    Exercise Vital Sign     Days of Exercise per Week: Patient declined     Minutes of Exercise per Session: 0 min   Stress: Patient Declined (12/27/2023)    [employer] [place] [employer] - Occupational Stress Questionnaire     Feeling of Stress : Patient declined   Housing Stability: Unknown (12/27/2023)    Housing Stability Vital Sign     Unable to Pay for Housing in the Last Year: Patient declined     Number of Places Lived in the Last Year: 1     Unstable Housing in the Last Year: No       Roles and Relationships  Primary Source of Support/Comfort: child(regla)  Name of Support/Comfort Primary Source: Janee Saleh  Primary Roles/Responsibilities: retired  Secondary Source of Support/Comfort: child(regla)  Name of Support/Comfort Secondary Source: Orville Lentz      Advance Directives (For Healthcare)  Advance Directive  (If Adv Dir status is received, view document under Adv Dir in header or Chart Review Media tab): Patient has advance directive, copy not received.  Patient Requests Assistance: Place consult order to /        Patient Reported Insurance  Verified current insurance plan:: Medicare            8/13/2024    10:14 AM 3/12/2024    10:13 AM 9/28/2023     2:07 PM 9/20/2023     2:36 PM 4/24/2023     9:53 AM 4/5/2022     9:42 AM   Depression Patient Health Questionnaire   Over the last two weeks how often have you been bothered by little interest or pleasure in doing things Not at all Not at all Not at all Several days Not at all Not at all   Over the last two weeks how often have you been bothered by feeling down, depressed or hopeless Not at all Not at all Not at all Not at all Not at all Not at all   PHQ-2 Total Score 0 0 0  1 0 0       Learning Assessment       08/13/2024 1017 [facility] (8/13/2024 - Present)   Created by Jodi Tanner, RN -  (Nurse) Status: Complete                 PRIMARY LEARNER     Primary Learner Name:  Mahsa Pedroza DM - 08/13/2024 1017    Relationship:  Patient DM - 08/13/2024 1017    Does the primary learner have any barriers to learning?:  No Barriers DM - 08/13/2024 1017    What is the preferred language of the primary learner?:  English DM - 08/13/2024 1017    Is an  required?:  No DM - 08/13/2024 1017    How does the primary learner prefer to learn new concepts?:  Listening, Reading DM - 08/13/2024 1017    How often do you need to have someone help you read instructions, pamphlets, or written material from your doctor or pharmacy?:  Sometimes DM - 08/13/2024 1017        CO-LEARNER #1     No question answered        CO-LEARNER #2     No question answered        SPECIAL TOPICS     No question answered        ANSWERED BY:     No question answered        Comments         Edit History       Jodi Tanner, RN -  (Nurse)   08/13/2024 1017

## 2024-08-14 ENCOUNTER — OUTPATIENT CASE MANAGEMENT (OUTPATIENT)
Dept: ADMINISTRATIVE | Facility: OTHER | Age: 89
End: 2024-08-14
Payer: MEDICARE

## 2024-08-16 NOTE — PROGRESS NOTES
Outpatient Care Management   - Patient Assessment    Patient: Mahsa Pedroza  MRN:  [id number]  Date of Service:  8/16/2024  Completed by:  Celia Mathis LCSW  Referral Date: 08/04/2024    Reason for Visit   Patient presents with    Social Work Assessment       Brief Summary:  received a referral from OPCM RN for the following HR SW psychosocial needs: Refer to SW for advance directives/living will, VA Aide and Attendance ( was in [place]). Call in am. The patient also requests assistance with contacting the [facility] so she can use their transportation service to go to the senior center. Care plan was created in collaboration with patient/caregiver input.   completed the SDOH questionnaire.

## 2024-08-20 ENCOUNTER — OUTPATIENT CASE MANAGEMENT (OUTPATIENT)
Dept: ADMINISTRATIVE | Facility: OTHER | Age: 89
End: 2024-08-20
Payer: MEDICARE

## 2024-08-20 NOTE — PROGRESS NOTES
8/20/2024  1st attempt to complete Follow-Up  for Outpatient Care Management, left message.  Will send letter thru [facility] portal.

## 2024-08-21 ENCOUNTER — OUTPATIENT CASE MANAGEMENT (OUTPATIENT)
Dept: ADMINISTRATIVE | Facility: OTHER | Age: 89
End: 2024-08-21
Payer: MEDICARE

## 2024-08-27 ENCOUNTER — OUTPATIENT CASE MANAGEMENT (OUTPATIENT)
Dept: ADMINISTRATIVE | Facility: OTHER | Age: 89
End: 2024-08-27
Payer: MEDICARE

## 2024-08-27 NOTE — PROGRESS NOTES
Outpatient Care Management  Plan of Care Follow Up Visit    Patient: Mahsa Pedroza  MRN: [id number]  Date of Service: 08/27/2024  Completed by: Jodi Tanner RN  Referral Date: 08/04/2024    No chief complaint on file.      Brief Summary: Appt for AWV on 08/29/24  Next Steps: Follow up in 3 weeks

## 2024-08-30 ENCOUNTER — DOCUMENT SCAN (OUTPATIENT)
Dept: HOME HEALTH SERVICES | Facility: HOSPITAL | Age: 89
End: 2024-08-30
Payer: MEDICARE

## 2024-09-05 ENCOUNTER — OUTPATIENT CASE MANAGEMENT (OUTPATIENT)
Dept: ADMINISTRATIVE | Facility: OTHER | Age: 89
End: 2024-09-05
Payer: MEDICARE

## 2024-09-05 NOTE — PROGRESS NOTES
09/05/24-Received voice mail from patient that she is moving to [place] to live with her other son. She does not need us anymore. She appreciates all that we have done for her. I will close her when SW has finished  her documentation.

## 2024-09-06 ENCOUNTER — OUTPATIENT CASE MANAGEMENT (OUTPATIENT)
Dept: ADMINISTRATIVE | Facility: OTHER | Age: 89
End: 2024-09-06
Payer: MEDICARE

## 2024-09-09 ENCOUNTER — OUTPATIENT CASE MANAGEMENT (OUTPATIENT)
Dept: ADMINISTRATIVE | Facility: OTHER | Age: 89
End: 2024-09-09
Payer: MEDICARE

## 2024-09-17 ENCOUNTER — EXTERNAL HOME HEALTH (OUTPATIENT)
Dept: HOME HEALTH SERVICES | Facility: HOSPITAL | Age: 89
End: 2024-09-17
Payer: MEDICARE

## 2024-09-25 ENCOUNTER — DOCUMENT SCAN (OUTPATIENT)
Dept: HOME HEALTH SERVICES | Facility: HOSPITAL | Age: 89
End: 2024-09-25
Payer: MEDICARE

## 2024-09-30 ENCOUNTER — DOCUMENT SCAN (OUTPATIENT)
Dept: HOME HEALTH SERVICES | Facility: HOSPITAL | Age: 89
End: 2024-09-30
Payer: MEDICARE

## 2024-10-24 NOTE — TELEPHONE ENCOUNTER
No care due was identified.  [facility] Embedded Care Due Messages. Reference number: [id number].   10/24/2024 1:43:27 PM CDT

## 2024-10-26 RX ORDER — LEVOTHYROXINE SODIUM 50 UG/1
50 TABLET ORAL
Qty: 90 TABLET | Refills: 0 | Status: SHIPPED | OUTPATIENT
Start: 2024-10-26

## 2024-10-26 NOTE — TELEPHONE ENCOUNTER
Refill Routing Note   Medication(s) are not appropriate for processing by [facility] for the following reason(s):        ED/Hospital Visit since last OV with provider  No active prescription written by provider    [facility] action(s):  Defer      ED visit 8/2/24 for hyponatremia.         Appointments  past 12m or future 3m with PCP    Date Provider   Last Visit   6/18/2024 Javier Mckeon MD   Next Visit   Visit date not found Javier Mckeon MD   ED visits in past 90 days: 0        Note composed:2:20 AM 10/26/2024

## 2025-02-22 DIAGNOSIS — Z00.00 ENCOUNTER FOR MEDICARE ANNUAL WELLNESS EXAM: ICD-10-CM

## 2025-06-17 NOTE — LETTER
Mahsa Pedroza  [address] [place] [address]      Dear Mahsa Pedroza,     I am your nurse with [facility]s Outpatient Care Management Department. I have been unsuccessful at reaching you since we spoke on 11/08/2023.  At your earliest convenience, I would like to discuss your healthcare progress.      Please contact me at [phone] from 8:00AM to 4:30 PM on Monday thru Friday.     As you know, [facility] is a program offered to you through [facility] where a nurse is available 24/7 to answer questions or provide medical advice, their number is [phone].    Thanks,  Jodi Tanner RN [facility]   Outpatient Care Management     
No

## (undated) DEVICE — TUBE AQUILEX INFLOW

## (undated) DEVICE — Device

## (undated) DEVICE — SCRUB HIBICLENS 4% CHG 4OZ

## (undated) DEVICE — JELLY SURGILUBE LUBE TUBE 2OZ

## (undated) DEVICE — NDL 18GA

## (undated) DEVICE — NDL TUOHY EPIDURAL 20G X 3.5

## (undated) DEVICE — GLOVE SURG ULTRA TOUCH 7

## (undated) DEVICE — GAUZE SPONGE XRAY 4X4

## (undated) DEVICE — APPLICATOR HEMOBLAST LAPSCP

## (undated) DEVICE — GLOVE SURG ULTRA TOUCH 7.5

## (undated) DEVICE — SOL NS 1000CC

## (undated) DEVICE — SUT ETHIBOND EXCEL 0 CT2 30

## (undated) DEVICE — CATH 16FR URETHRL RED RUB

## (undated) DEVICE — SUT VICRYL 0 CT-2 27 DYE

## (undated) DEVICE — DEVICE MYOSURE REACH

## (undated) DEVICE — ELECTRODE REM PLYHSV RETURN 9

## (undated) DEVICE — GLOVE SURGEONS ULTRA TOUCH 6.5

## (undated) DEVICE — SOL ELECTROLUBE ANTI-STIC

## (undated) DEVICE — COVER LIGHT HANDLE 80/CA

## (undated) DEVICE — PACK CUSTOM UNIV BASIN SLI

## (undated) DEVICE — SEE MEDLINE ITEM 157117

## (undated) DEVICE — TIP SACROCOLPOPEXY SM 1.3X3.5

## (undated) DEVICE — DRAPE ARM DAVINCI XI

## (undated) DEVICE — SYR B-D DISP CONTROL 10CC100/C

## (undated) DEVICE — ADHESIVE DERMABOND ADVANCED

## (undated) DEVICE — SLEEVE SCD EXPRESS CALF MEDIUM

## (undated) DEVICE — SYR LUER SLIP GLASS 5ML

## (undated) DEVICE — CANISTER SUCTION 3000CC

## (undated) DEVICE — DRAPE COLUMN DAVINCI XI

## (undated) DEVICE — SYS LABEL CORRECT MED

## (undated) DEVICE — OBTURATOR BLADELESS 8MM XI CLR

## (undated) DEVICE — PACK DRAPE PERI/GYN TIBURON

## (undated) DEVICE — TRAY DRY SPONGE SCRUB W FOAM

## (undated) DEVICE — DRAPE ADPT RUMI II ADVINCULA

## (undated) DEVICE — SLEEVE SCD EXPRESS KNEE MEDIUM

## (undated) DEVICE — SOL NACL IRR 3000ML

## (undated) DEVICE — BELLOW CANN HEMOBLAST 1.65GR

## (undated) DEVICE — SUT EASE CROSSBOW CLSR SYS

## (undated) DEVICE — SET CYSTO IRRIGATION UNIV SPIK

## (undated) DEVICE — TROCAR ENDO Z THREAD KII 5X100

## (undated) DEVICE — DRESSING TELFA PAD N ADH 2X3

## (undated) DEVICE — MARKER SKIN RULER AND LABEL

## (undated) DEVICE — SYR SLIP TIP 10ML SHIELD

## (undated) DEVICE — SEE MEDLINE ITEM 146292

## (undated) DEVICE — NDL ECLIPSE SAFETY 18GX1-1/2IN

## (undated) DEVICE — CLOSURE SKIN STERI STRIP 1/2X4

## (undated) DEVICE — SEAL UNIVERSAL 5MM-8MM XI

## (undated) DEVICE — UNDERGLOVES BIOGEL PI SIZE 7.5

## (undated) DEVICE — KIT CANNISTER AQUILEX

## (undated) DEVICE — KIT NERVE BLOCK PREP BAPTIST

## (undated) DEVICE — SPONGE DERMACEA GAUZE 4X4

## (undated) DEVICE — TUBE AQUILEX OUTFLOW

## (undated) DEVICE — STRAP OR TABLE 5IN X 72IN

## (undated) DEVICE — TROCAR ENDOPATH XCEL 11MM 10CM

## (undated) DEVICE — TRAY DRY SKIN SCRUB PREP

## (undated) DEVICE — SUT MONOCRYL 4-0 PS-2

## (undated) DEVICE — NDL SAFETY 25G X 1.5 ECLIPSE

## (undated) DEVICE — SPONGE NOVAPLUS LAP 18X18IN

## (undated) DEVICE — DRAPE ABDOMINAL TIBURON 14X11

## (undated) DEVICE — LINER SUCTION 3000CC

## (undated) DEVICE — COVER TIP CURVED SCISSORS XI

## (undated) DEVICE — SET TUBE PNEUMOCLEAR SE HI FLO

## (undated) DEVICE — SEE MEDLINE ITEM 157181

## (undated) DEVICE — PAD PREPS ALCOHOL 2-PLY LARGE

## (undated) DEVICE — PAD SANITARY OB STERILE

## (undated) DEVICE — BAG TISS RETRV MONARCH 10MM

## (undated) DEVICE — PAD PINK TRENDELENBURG POS XL

## (undated) DEVICE — LEGGING CLEAR POLY 2/PACK

## (undated) DEVICE — PACK BASIC

## (undated) DEVICE — APPLICATOR CHLORAPREP ORN 26ML

## (undated) DEVICE — TOWEL OR DISP STRL BLUE 4/PK

## (undated) DEVICE — IRRIGATOR ENDOSCOPY DISP.

## (undated) DEVICE — SEAL LENS SCOPE MYOSURE

## (undated) DEVICE — UNDERPAD ULTRASORB 300LB 30X36

## (undated) DEVICE — SOL CLEARIFY VISUALIZATION LAP

## (undated) DEVICE — SEALER VESSEL EXTEND

## (undated) DEVICE — SOL NACL IRR 1000ML BTL

## (undated) DEVICE — APPLICATOR CHLORAPREP CLR 10.5

## (undated) DEVICE — CARTRIDGE BABCOCK GRASPER 5X45

## (undated) DEVICE — SYR 10CC LUER LOCK

## (undated) DEVICE — GLOVE PI ULTRA TOUCH G SURGEON

## (undated) DEVICE — GLOVE BIOGEL PI MICRO INDIC 7

## (undated) DEVICE — SUT MONOCRYL 0 CT-1 UND MON

## (undated) DEVICE — SUT ABS CLIP LAPRA-TY CTD

## (undated) DEVICE — SYR BULB IRRIG ST 60 LF

## (undated) DEVICE — SUT GORETEX CV-4 TH-26 36IN

## (undated) DEVICE — GOWN B1 X-LG X-LONG